# Patient Record
Sex: FEMALE | Race: WHITE | Employment: OTHER | RURAL
[De-identification: names, ages, dates, MRNs, and addresses within clinical notes are randomized per-mention and may not be internally consistent; named-entity substitution may affect disease eponyms.]

---

## 2017-01-12 ENCOUNTER — OFFICE VISIT (OUTPATIENT)
Dept: PRIMARY CARE CLINIC | Age: 82
End: 2017-01-12
Payer: MEDICARE

## 2017-01-12 VITALS
OXYGEN SATURATION: 96 % | BODY MASS INDEX: 22.68 KG/M2 | DIASTOLIC BLOOD PRESSURE: 76 MMHG | SYSTOLIC BLOOD PRESSURE: 124 MMHG | HEIGHT: 63 IN | RESPIRATION RATE: 18 BRPM | WEIGHT: 128 LBS | HEART RATE: 76 BPM

## 2017-01-12 DIAGNOSIS — M48.061 SPINAL STENOSIS, LUMBAR: ICD-10-CM

## 2017-01-12 DIAGNOSIS — E55.9 VITAMIN D DEFICIENCY: ICD-10-CM

## 2017-01-12 DIAGNOSIS — E53.8 B12 DEFICIENCY: ICD-10-CM

## 2017-01-12 DIAGNOSIS — I10 ESSENTIAL HYPERTENSION: ICD-10-CM

## 2017-01-12 DIAGNOSIS — M51.36 DEGENERATIVE DISC DISEASE, LUMBAR: Primary | ICD-10-CM

## 2017-01-12 PROCEDURE — 1090F PRES/ABSN URINE INCON ASSESS: CPT | Performed by: INTERNAL MEDICINE

## 2017-01-12 PROCEDURE — G8419 CALC BMI OUT NRM PARAM NOF/U: HCPCS | Performed by: INTERNAL MEDICINE

## 2017-01-12 PROCEDURE — 96372 THER/PROPH/DIAG INJ SC/IM: CPT | Performed by: INTERNAL MEDICINE

## 2017-01-12 PROCEDURE — G8484 FLU IMMUNIZE NO ADMIN: HCPCS | Performed by: INTERNAL MEDICINE

## 2017-01-12 PROCEDURE — 1123F ACP DISCUSS/DSCN MKR DOCD: CPT | Performed by: INTERNAL MEDICINE

## 2017-01-12 PROCEDURE — 99213 OFFICE O/P EST LOW 20 MIN: CPT | Performed by: INTERNAL MEDICINE

## 2017-01-12 PROCEDURE — 4040F PNEUMOC VAC/ADMIN/RCVD: CPT | Performed by: INTERNAL MEDICINE

## 2017-01-12 PROCEDURE — G8427 DOCREV CUR MEDS BY ELIG CLIN: HCPCS | Performed by: INTERNAL MEDICINE

## 2017-01-12 PROCEDURE — 1036F TOBACCO NON-USER: CPT | Performed by: INTERNAL MEDICINE

## 2017-01-12 RX ORDER — CYANOCOBALAMIN 1000 UG/ML
1000 INJECTION INTRAMUSCULAR; SUBCUTANEOUS ONCE
Status: COMPLETED | OUTPATIENT
Start: 2017-01-12 | End: 2017-01-12

## 2017-01-12 RX ORDER — HYDROCODONE BITARTRATE AND ACETAMINOPHEN 10; 325 MG/1; MG/1
0.5 TABLET ORAL EVERY 8 HOURS PRN
Qty: 30 TABLET | Refills: 0 | Status: SHIPPED | OUTPATIENT
Start: 2017-01-12 | End: 2017-06-06 | Stop reason: ALTCHOICE

## 2017-01-12 RX ADMIN — CYANOCOBALAMIN 1000 MCG: 1000 INJECTION INTRAMUSCULAR; SUBCUTANEOUS at 12:18

## 2017-01-12 ASSESSMENT — ENCOUNTER SYMPTOMS
ABDOMINAL PAIN: 0
EYE DISCHARGE: 1
SHORTNESS OF BREATH: 0
WHEEZING: 0
SINUS PRESSURE: 0
EYE REDNESS: 1
BACK PAIN: 1
NAUSEA: 0
EYE PAIN: 1
SORE THROAT: 0
VOMITING: 0
COUGH: 0

## 2017-03-06 RX ORDER — FAMOTIDINE 20 MG/1
20 TABLET, FILM COATED ORAL 2 TIMES DAILY
Qty: 180 TABLET | Refills: 3 | Status: SHIPPED | OUTPATIENT
Start: 2017-03-06 | End: 2017-03-06 | Stop reason: SDUPTHER

## 2017-03-06 RX ORDER — FAMOTIDINE 20 MG/1
20 TABLET, FILM COATED ORAL 2 TIMES DAILY
Qty: 180 TABLET | Refills: 3 | Status: SHIPPED | OUTPATIENT
Start: 2017-03-06 | End: 2018-05-07 | Stop reason: SDUPTHER

## 2017-05-03 ENCOUNTER — NURSE ONLY (OUTPATIENT)
Dept: PRIMARY CARE CLINIC | Age: 82
End: 2017-05-03
Payer: MEDICARE

## 2017-05-03 DIAGNOSIS — E53.8 B12 DEFICIENCY: Primary | ICD-10-CM

## 2017-05-03 PROCEDURE — 96372 THER/PROPH/DIAG INJ SC/IM: CPT | Performed by: INTERNAL MEDICINE

## 2017-05-03 RX ORDER — CYANOCOBALAMIN 1000 UG/ML
1000 INJECTION INTRAMUSCULAR; SUBCUTANEOUS ONCE
Status: COMPLETED | OUTPATIENT
Start: 2017-05-03 | End: 2017-05-03

## 2017-05-03 RX ADMIN — CYANOCOBALAMIN 1000 MCG: 1000 INJECTION INTRAMUSCULAR; SUBCUTANEOUS at 11:56

## 2017-05-18 ENCOUNTER — HOSPITAL ENCOUNTER (OUTPATIENT)
Dept: OTHER | Age: 82
Discharge: HOME | End: 2017-05-18
Attending: INTERNAL MEDICINE | Admitting: INTERNAL MEDICINE

## 2017-05-18 DIAGNOSIS — I10 ESSENTIAL HYPERTENSION: ICD-10-CM

## 2017-05-18 DIAGNOSIS — E55.9 VITAMIN D DEFICIENCY: ICD-10-CM

## 2017-05-18 LAB
A/G RATIO: 1.4 (ref 0.8–2)
ALBUMIN SERPL-MCNC: 4 G/DL (ref 3.4–4.8)
ALP BLD-CCNC: 93 U/L (ref 25–100)
ALT SERPL-CCNC: 8 U/L (ref 4–36)
ANION GAP SERPL CALCULATED.3IONS-SCNC: 11 MMOL/L (ref 3–16)
AST SERPL-CCNC: 18 U/L (ref 8–33)
BASOPHILS ABSOLUTE: 0 K/UL (ref 0–0.1)
BASOPHILS RELATIVE PERCENT: 0.5 %
BILIRUB SERPL-MCNC: 0.4 MG/DL (ref 0.3–1.2)
BUN BLDV-MCNC: 13 MG/DL (ref 6–20)
CALCIUM SERPL-MCNC: 9.7 MG/DL (ref 8.5–10.5)
CHLORIDE BLD-SCNC: 98 MMOL/L (ref 98–107)
CHOLESTEROL, TOTAL: 300 MG/DL (ref 0–200)
CO2: 28 MMOL/L (ref 20–30)
CREAT SERPL-MCNC: 0.7 MG/DL (ref 0.4–1.2)
EOSINOPHILS ABSOLUTE: 0.3 K/UL (ref 0–0.4)
EOSINOPHILS RELATIVE PERCENT: 3.3 %
GFR AFRICAN AMERICAN: >59
GFR NON-AFRICAN AMERICAN: >60
GLOBULIN: 2.9 G/DL
GLUCOSE BLD-MCNC: 96 MG/DL (ref 74–106)
HCT VFR BLD CALC: 43.1 % (ref 37–47)
HDLC SERPL-MCNC: 64 MG/DL (ref 40–60)
HEMOGLOBIN: 14.1 G/DL (ref 11.5–16.5)
LDL CHOLESTEROL CALCULATED: 205 MG/DL
LYMPHOCYTES ABSOLUTE: 1.4 K/UL (ref 1.5–4)
LYMPHOCYTES RELATIVE PERCENT: 18 % (ref 20–40)
MCH RBC QN AUTO: 29.7 PG (ref 27–32)
MCHC RBC AUTO-ENTMCNC: 32.7 G/DL (ref 31–35)
MCV RBC AUTO: 90.7 FL (ref 80–100)
MONOCYTES ABSOLUTE: 0.6 K/UL (ref 0.2–0.8)
MONOCYTES RELATIVE PERCENT: 7.9 % (ref 3–10)
NEUTROPHILS ABSOLUTE: 5.3 K/UL (ref 2–7.5)
NEUTROPHILS RELATIVE PERCENT: 70.3 %
PDW BLD-RTO: 13.3 % (ref 11–16)
PLATELET # BLD: 252 K/UL (ref 150–400)
PMV BLD AUTO: 11 FL (ref 6–10)
POTASSIUM SERPL-SCNC: 4.2 MMOL/L (ref 3.4–5.1)
RBC # BLD: 4.75 M/UL (ref 3.8–5.8)
SODIUM BLD-SCNC: 137 MMOL/L (ref 136–145)
TOTAL PROTEIN: 6.9 G/DL (ref 6.4–8.3)
TRIGL SERPL-MCNC: 157 MG/DL (ref 0–249)
VITAMIN D 25-HYDROXY: 50.3 (ref 32–100)
VLDLC SERPL CALC-MCNC: 31 MG/DL
WBC # BLD: 7.6 K/UL (ref 4–11)

## 2017-06-06 ENCOUNTER — OFFICE VISIT (OUTPATIENT)
Dept: PRIMARY CARE CLINIC | Age: 82
End: 2017-06-06
Payer: MEDICARE

## 2017-06-06 VITALS
BODY MASS INDEX: 22.53 KG/M2 | HEART RATE: 82 BPM | OXYGEN SATURATION: 98 % | RESPIRATION RATE: 18 BRPM | SYSTOLIC BLOOD PRESSURE: 124 MMHG | WEIGHT: 127.2 LBS | DIASTOLIC BLOOD PRESSURE: 70 MMHG

## 2017-06-06 DIAGNOSIS — I10 ESSENTIAL HYPERTENSION: ICD-10-CM

## 2017-06-06 DIAGNOSIS — R91.1 LUNG NODULE: ICD-10-CM

## 2017-06-06 DIAGNOSIS — E53.8 B12 DEFICIENCY: ICD-10-CM

## 2017-06-06 DIAGNOSIS — M48.061 SPINAL STENOSIS, LUMBAR: ICD-10-CM

## 2017-06-06 DIAGNOSIS — M51.36 DEGENERATIVE DISC DISEASE, LUMBAR: Primary | ICD-10-CM

## 2017-06-06 DIAGNOSIS — E55.9 VITAMIN D DEFICIENCY: ICD-10-CM

## 2017-06-06 PROCEDURE — 1090F PRES/ABSN URINE INCON ASSESS: CPT | Performed by: INTERNAL MEDICINE

## 2017-06-06 PROCEDURE — 4040F PNEUMOC VAC/ADMIN/RCVD: CPT | Performed by: INTERNAL MEDICINE

## 2017-06-06 PROCEDURE — 99213 OFFICE O/P EST LOW 20 MIN: CPT | Performed by: INTERNAL MEDICINE

## 2017-06-06 PROCEDURE — 1036F TOBACCO NON-USER: CPT | Performed by: INTERNAL MEDICINE

## 2017-06-06 PROCEDURE — 1123F ACP DISCUSS/DSCN MKR DOCD: CPT | Performed by: INTERNAL MEDICINE

## 2017-06-06 PROCEDURE — G8420 CALC BMI NORM PARAMETERS: HCPCS | Performed by: INTERNAL MEDICINE

## 2017-06-06 PROCEDURE — G8427 DOCREV CUR MEDS BY ELIG CLIN: HCPCS | Performed by: INTERNAL MEDICINE

## 2017-06-06 ASSESSMENT — PATIENT HEALTH QUESTIONNAIRE - PHQ9
SUM OF ALL RESPONSES TO PHQ9 QUESTIONS 1 & 2: 1
1. LITTLE INTEREST OR PLEASURE IN DOING THINGS: 0
2. FEELING DOWN, DEPRESSED OR HOPELESS: 1
SUM OF ALL RESPONSES TO PHQ QUESTIONS 1-9: 1

## 2017-06-07 ASSESSMENT — ENCOUNTER SYMPTOMS
ABDOMINAL PAIN: 0
WHEEZING: 0
SHORTNESS OF BREATH: 0
NAUSEA: 0
BACK PAIN: 1
SINUS PRESSURE: 0
VOMITING: 0
SORE THROAT: 0
EYE DISCHARGE: 0
COUGH: 0

## 2017-06-13 ENCOUNTER — NURSE ONLY (OUTPATIENT)
Dept: PRIMARY CARE CLINIC | Age: 82
End: 2017-06-13
Payer: MEDICARE

## 2017-06-13 DIAGNOSIS — E53.8 B12 DEFICIENCY: Primary | ICD-10-CM

## 2017-06-13 PROCEDURE — 96372 THER/PROPH/DIAG INJ SC/IM: CPT | Performed by: INTERNAL MEDICINE

## 2017-06-13 RX ORDER — CYANOCOBALAMIN 1000 UG/ML
1000 INJECTION INTRAMUSCULAR; SUBCUTANEOUS ONCE
Status: COMPLETED | OUTPATIENT
Start: 2017-06-13 | End: 2017-06-13

## 2017-06-13 RX ADMIN — CYANOCOBALAMIN 1000 MCG: 1000 INJECTION INTRAMUSCULAR; SUBCUTANEOUS at 13:50

## 2017-06-14 RX ORDER — ESOMEPRAZOLE MAGNESIUM 40 MG/1
40 CAPSULE, DELAYED RELEASE ORAL DAILY
Qty: 90 CAPSULE | Refills: 3 | Status: SHIPPED | OUTPATIENT
Start: 2017-06-14 | End: 2018-02-12 | Stop reason: SDUPTHER

## 2017-06-19 ENCOUNTER — HOSPITAL ENCOUNTER (OUTPATIENT)
Dept: MRI IMAGING | Age: 82
Discharge: HOME | End: 2017-06-19
Attending: INTERNAL MEDICINE | Admitting: INTERNAL MEDICINE

## 2017-06-19 DIAGNOSIS — M48.061 SPINAL STENOSIS, LUMBAR: ICD-10-CM

## 2017-06-19 DIAGNOSIS — M51.36 DEGENERATIVE DISC DISEASE, LUMBAR: ICD-10-CM

## 2017-06-19 DIAGNOSIS — R91.1 SOLITARY PULMONARY NODULE: ICD-10-CM

## 2017-07-12 ENCOUNTER — OFFICE VISIT (OUTPATIENT)
Dept: PRIMARY CARE CLINIC | Age: 82
End: 2017-07-12
Payer: MEDICARE

## 2017-07-12 VITALS
OXYGEN SATURATION: 97 % | WEIGHT: 127.8 LBS | HEART RATE: 83 BPM | DIASTOLIC BLOOD PRESSURE: 64 MMHG | SYSTOLIC BLOOD PRESSURE: 126 MMHG | RESPIRATION RATE: 18 BRPM | BODY MASS INDEX: 22.64 KG/M2

## 2017-07-12 DIAGNOSIS — E53.8 B12 DEFICIENCY: ICD-10-CM

## 2017-07-12 DIAGNOSIS — E78.5 HYPERLIPIDEMIA, UNSPECIFIED HYPERLIPIDEMIA TYPE: ICD-10-CM

## 2017-07-12 DIAGNOSIS — M51.36 DEGENERATIVE DISC DISEASE, LUMBAR: Primary | ICD-10-CM

## 2017-07-12 DIAGNOSIS — M48.061 SPINAL STENOSIS, LUMBAR: ICD-10-CM

## 2017-07-12 DIAGNOSIS — I10 ESSENTIAL HYPERTENSION: ICD-10-CM

## 2017-07-12 DIAGNOSIS — E55.9 VITAMIN D DEFICIENCY: ICD-10-CM

## 2017-07-12 PROCEDURE — G8427 DOCREV CUR MEDS BY ELIG CLIN: HCPCS | Performed by: INTERNAL MEDICINE

## 2017-07-12 PROCEDURE — 1123F ACP DISCUSS/DSCN MKR DOCD: CPT | Performed by: INTERNAL MEDICINE

## 2017-07-12 PROCEDURE — G8420 CALC BMI NORM PARAMETERS: HCPCS | Performed by: INTERNAL MEDICINE

## 2017-07-12 PROCEDURE — 4040F PNEUMOC VAC/ADMIN/RCVD: CPT | Performed by: INTERNAL MEDICINE

## 2017-07-12 PROCEDURE — 96372 THER/PROPH/DIAG INJ SC/IM: CPT | Performed by: INTERNAL MEDICINE

## 2017-07-12 PROCEDURE — 1090F PRES/ABSN URINE INCON ASSESS: CPT | Performed by: INTERNAL MEDICINE

## 2017-07-12 PROCEDURE — 99213 OFFICE O/P EST LOW 20 MIN: CPT | Performed by: INTERNAL MEDICINE

## 2017-07-12 PROCEDURE — 1036F TOBACCO NON-USER: CPT | Performed by: INTERNAL MEDICINE

## 2017-07-12 RX ORDER — CYANOCOBALAMIN 1000 UG/ML
1000 INJECTION INTRAMUSCULAR; SUBCUTANEOUS ONCE
Status: COMPLETED | OUTPATIENT
Start: 2017-07-12 | End: 2017-07-12

## 2017-07-12 RX ORDER — RANITIDINE 150 MG/1
150 TABLET ORAL 2 TIMES DAILY
Qty: 60 TABLET | Refills: 1 | Status: SHIPPED | OUTPATIENT
Start: 2017-07-12 | End: 2017-09-18

## 2017-07-12 RX ADMIN — CYANOCOBALAMIN 1000 MCG: 1000 INJECTION INTRAMUSCULAR; SUBCUTANEOUS at 15:50

## 2017-07-12 ASSESSMENT — ENCOUNTER SYMPTOMS
SHORTNESS OF BREATH: 0
NAUSEA: 0
SORE THROAT: 0
BACK PAIN: 1
COUGH: 0
ABDOMINAL PAIN: 0
EYE DISCHARGE: 0
VOMITING: 0
WHEEZING: 0
SINUS PRESSURE: 0

## 2017-07-24 ENCOUNTER — OFFICE VISIT (OUTPATIENT)
Dept: OBSTETRICS AND GYNECOLOGY | Facility: CLINIC | Age: 82
End: 2017-07-24

## 2017-07-24 VITALS
WEIGHT: 128 LBS | BODY MASS INDEX: 23.55 KG/M2 | HEIGHT: 62 IN | SYSTOLIC BLOOD PRESSURE: 112 MMHG | DIASTOLIC BLOOD PRESSURE: 74 MMHG

## 2017-07-24 DIAGNOSIS — N95.2 ATROPHIC VAGINITIS: Primary | ICD-10-CM

## 2017-07-24 DIAGNOSIS — R35.0 URINE FREQUENCY: ICD-10-CM

## 2017-07-24 PROCEDURE — 99212 OFFICE O/P EST SF 10 MIN: CPT | Performed by: PHYSICIAN ASSISTANT

## 2017-07-24 RX ORDER — CLONIDINE HYDROCHLORIDE 0.2 MG/1
TABLET ORAL
COMMUNITY
Start: 2016-12-12

## 2017-07-24 RX ORDER — ALPRAZOLAM 0.25 MG/1
0.25 TABLET ORAL AS NEEDED
COMMUNITY
Start: 2016-11-21

## 2017-07-24 RX ORDER — NIFEDIPINE 30 MG/1
30 TABLET, EXTENDED RELEASE ORAL DAILY
COMMUNITY
Start: 2016-09-21

## 2017-07-24 RX ORDER — FAMOTIDINE 20 MG/1
20 TABLET, FILM COATED ORAL 2 TIMES DAILY PRN
COMMUNITY
Start: 2017-03-06

## 2017-07-24 RX ORDER — FAMOTIDINE 20 MG/1
TABLET, FILM COATED ORAL
Refills: 0 | COMMUNITY
Start: 2017-06-08 | End: 2018-05-15 | Stop reason: SDUPTHER

## 2017-07-24 RX ORDER — ESOMEPRAZOLE MAGNESIUM 40 MG/1
40 CAPSULE, DELAYED RELEASE ORAL DAILY
COMMUNITY
Start: 2017-06-14

## 2017-07-24 RX ORDER — METRONIDAZOLE 10 MG/G
GEL TOPICAL
COMMUNITY
Start: 2016-02-18

## 2017-07-24 RX ORDER — CLONIDINE HYDROCHLORIDE 0.2 MG/1
TABLET ORAL
Refills: 0 | COMMUNITY
Start: 2017-06-08 | End: 2018-05-15 | Stop reason: SDUPTHER

## 2017-07-24 RX ORDER — SERTRALINE HYDROCHLORIDE 100 MG/1
TABLET, FILM COATED ORAL
COMMUNITY
Start: 2016-08-25

## 2017-07-24 RX ORDER — RANITIDINE 150 MG/1
TABLET ORAL
COMMUNITY
Start: 2017-07-12 | End: 2018-05-15

## 2017-07-24 RX ORDER — CARVEDILOL 25 MG/1
TABLET ORAL
COMMUNITY
Start: 2016-09-09

## 2017-07-24 RX ORDER — ESTRADIOL 0.1 MG/G
2 CREAM VAGINAL DAILY
COMMUNITY
Start: 2016-09-21

## 2017-07-24 RX ORDER — METOPROLOL SUCCINATE 25 MG/1
TABLET, EXTENDED RELEASE ORAL
COMMUNITY
Start: 2016-12-12 | End: 2019-06-13

## 2017-07-24 NOTE — PROGRESS NOTES
"Subjective   Chief Complaint   Patient presents with   • Vaginal Discharge   • vaginal atrophy   • Urinary Frequency     doesn't always empty     /74  Ht 62\" (157.5 cm)  Wt 128 lb (58.1 kg)  BMI 23.41 kg/m2   Gill Guerrero is a 86 y.o. year old  presenting to be seen for complaints of vaginal irritation and dryness  She feels very irritated vaginal opening, inside vagina and perineum  She does report thin water discharge from time to time  She has been given premarin cream in the past but does not use regularly and has not used for several months  She has had no vaginal bleeding or spotting  She has not had any UTI nor dysuria-she does have some urinary frequency and urgency     Past Medical History:   Diagnosis Date   • Anemia    • Anxiety    • Depression    • GERD (gastroesophageal reflux disease)    • Osteopenia    • Urinary tract infection         Current Outpatient Prescriptions:   •  ALPRAZolam (XANAX) 0.25 MG tablet, Take  by mouth., Disp: , Rfl:   •  carvedilol (COREG) 25 MG tablet, take 1 tablet by mouth twice a day, Disp: , Rfl:   •  CloNIDine (CATAPRES) 0.2 MG tablet, take 1 tablet by mouth twice a day, Disp: , Rfl:   •  CloNIDine (CATAPRES) 0.2 MG tablet, , Disp: , Rfl: 0  •  esomeprazole (nexIUM) 40 MG capsule, Take  by mouth., Disp: , Rfl:   •  estradiol (ESTRACE) 0.1 MG/GM vaginal cream, Insert  into the vagina., Disp: , Rfl:   •  famotidine (PEPCID) 20 MG tablet, Take  by mouth., Disp: , Rfl:   •  famotidine (PEPCID) 20 MG tablet, , Disp: , Rfl: 0  •  metoprolol succinate XL (TOPROL-XL) 25 MG 24 hr tablet, take 1 tablet by mouth once daily, Disp: , Rfl:   •  metroNIDAZOLE (METROGEL) 1 % gel, Bid As directed for 4 weeks, Disp: , Rfl:   •  Multiple Vitamins-Minerals (MULTIVITAMIN ADULT PO), Take  by mouth., Disp: , Rfl:   •  NIFEdipine XL (PROCARDIA XL) 30 MG 24 hr tablet, Take  by mouth., Disp: , Rfl:   •  raNITIdine (ZANTAC) 150 MG tablet, Take  by mouth., Disp: , Rfl:   •  " sertraline (ZOLOFT) 100 MG tablet, Take two pills daily., Disp: , Rfl:    Allergies   Allergen Reactions   • Cephalexin    • Chocolate    • Erythromycin    • Iodine    • Nuts    • Penicillins    • Shellfish-Derived Products    • Tramadol Itching      Past Surgical History:   Procedure Laterality Date   • ADENOIDECTOMY     • BREAST BIOPSY     • HYSTERECTOMY     • THYROID BIOPSY     • TOTAL HIP ARTHROPLASTY      right   • WISDOM TOOTH EXTRACTION        Social History     Social History   • Marital status:      Spouse name: N/A   • Number of children: N/A   • Years of education: N/A     Occupational History   • Not on file.     Social History Main Topics   • Smoking status: Former Smoker   • Smokeless tobacco: Never Used   • Alcohol use No   • Drug use: No   • Sexual activity: Defer     Other Topics Concern   • Not on file     Social History Narrative   • No narrative on file      Family History   Problem Relation Age of Onset   • Coronary artery disease Father    • Hyperlipidemia Father    • Coronary artery disease Brother    • Hyperlipidemia Brother    • Coronary artery disease Sister    • Hyperlipidemia Sister    • Hypertension Sister    • Hyperlipidemia Paternal Grandfather    • Hyperlipidemia Paternal Grandmother    • Hyperlipidemia Maternal Grandmother    • Hyperlipidemia Maternal Grandfather        The following portions of the patient's history were reviewed and updated as appropriate:problem list, current medications, allergies, past family history, past medical history, past social history and past surgical history.    Review of Systems   Gastrointestinal: Negative.    Genitourinary: Positive for frequency, urgency and vaginal pain. Negative for dysuria and vaginal bleeding.   Musculoskeletal: Positive for arthralgias.   Neurological: Positive for weakness.   Psychiatric/Behavioral: Positive for dysphoric mood.        Objective     Physical Exam   Constitutional: She appears well-developed and  well-nourished. She is cooperative.   Abdominal: Soft. Normal appearance. She exhibits no distension and no mass. There is no tenderness. There is no rigidity and no guarding.   Genitourinary: There is no tenderness or lesion on the right labia. There is no tenderness or lesion on the left labia. Right adnexum displays no mass and no tenderness. Left adnexum displays no mass and no tenderness. There is tenderness in the vagina. No bleeding in the vagina. No signs of injury around the vagina. No vaginal discharge found.   Genitourinary Comments: Vaginal tissue very atrophic--no lesions noted-no bleeding   Neurological: She is alert.   Skin: Skin is warm, dry and intact.   Psychiatric: She has a normal mood and affect. Her behavior is normal.     Gill was seen today for vaginal discharge, vaginal atrophy and urinary frequency.    Diagnoses and all orders for this visit:    Atrophic vaginitis    Urine frequency             This note was electronically signed.    Makenna Cedeño PA-C   July 24, 2017

## 2017-09-12 ENCOUNTER — NURSE ONLY (OUTPATIENT)
Dept: PRIMARY CARE CLINIC | Age: 82
End: 2017-09-12
Payer: MEDICARE

## 2017-09-12 DIAGNOSIS — R30.0 DYSURIA: Primary | ICD-10-CM

## 2017-09-12 DIAGNOSIS — E53.8 B12 DEFICIENCY: ICD-10-CM

## 2017-09-12 LAB
BILIRUBIN, POC: ABNORMAL
BLOOD URINE, POC: ABNORMAL
CLARITY, POC: CLEAR
COLOR, POC: YELLOW
GLUCOSE URINE, POC: ABNORMAL
KETONES, POC: ABNORMAL
LEUKOCYTE EST, POC: ABNORMAL
NITRITE, POC: ABNORMAL
PH, POC: 6
PROTEIN, POC: ABNORMAL
SPECIFIC GRAVITY, POC: 1.01
UROBILINOGEN, POC: 0.2

## 2017-09-12 PROCEDURE — 96372 THER/PROPH/DIAG INJ SC/IM: CPT | Performed by: INTERNAL MEDICINE

## 2017-09-12 PROCEDURE — 81002 URINALYSIS NONAUTO W/O SCOPE: CPT | Performed by: INTERNAL MEDICINE

## 2017-09-12 RX ORDER — CYANOCOBALAMIN 1000 UG/ML
1000 INJECTION INTRAMUSCULAR; SUBCUTANEOUS ONCE
Status: COMPLETED | OUTPATIENT
Start: 2017-09-12 | End: 2017-09-12

## 2017-09-12 RX ORDER — CIPROFLOXACIN 250 MG/1
250 TABLET, FILM COATED ORAL 2 TIMES DAILY
Qty: 12 TABLET | Refills: 0 | Status: SHIPPED | OUTPATIENT
Start: 2017-09-12 | End: 2017-09-18

## 2017-09-14 RX ORDER — NITROFURANTOIN MACROCRYSTALS 100 MG/1
100 CAPSULE ORAL 2 TIMES DAILY
Qty: 10 CAPSULE | Refills: 0 | Status: SHIPPED | OUTPATIENT
Start: 2017-09-14 | End: 2017-09-19

## 2017-09-14 RX ORDER — NIFEDIPINE 30 MG/1
TABLET, EXTENDED RELEASE ORAL
Qty: 90 TABLET | Refills: 3 | Status: SHIPPED | OUTPATIENT
Start: 2017-09-14 | End: 2018-09-30 | Stop reason: SDUPTHER

## 2017-09-18 ENCOUNTER — OFFICE VISIT (OUTPATIENT)
Dept: PRIMARY CARE CLINIC | Age: 82
End: 2017-09-18
Payer: MEDICARE

## 2017-09-18 VITALS
HEIGHT: 63 IN | DIASTOLIC BLOOD PRESSURE: 74 MMHG | BODY MASS INDEX: 22.15 KG/M2 | OXYGEN SATURATION: 94 % | SYSTOLIC BLOOD PRESSURE: 136 MMHG | RESPIRATION RATE: 18 BRPM | HEART RATE: 81 BPM | WEIGHT: 125 LBS

## 2017-09-18 DIAGNOSIS — M48.061 SPINAL STENOSIS, LUMBAR: ICD-10-CM

## 2017-09-18 DIAGNOSIS — I10 ESSENTIAL HYPERTENSION: Primary | ICD-10-CM

## 2017-09-18 DIAGNOSIS — R07.89 ATYPICAL CHEST PAIN: ICD-10-CM

## 2017-09-18 DIAGNOSIS — M51.36 DEGENERATIVE DISC DISEASE, LUMBAR: ICD-10-CM

## 2017-09-18 PROCEDURE — 4040F PNEUMOC VAC/ADMIN/RCVD: CPT | Performed by: INTERNAL MEDICINE

## 2017-09-18 PROCEDURE — 1036F TOBACCO NON-USER: CPT | Performed by: INTERNAL MEDICINE

## 2017-09-18 PROCEDURE — G8420 CALC BMI NORM PARAMETERS: HCPCS | Performed by: INTERNAL MEDICINE

## 2017-09-18 PROCEDURE — 99213 OFFICE O/P EST LOW 20 MIN: CPT | Performed by: INTERNAL MEDICINE

## 2017-09-18 PROCEDURE — 1090F PRES/ABSN URINE INCON ASSESS: CPT | Performed by: INTERNAL MEDICINE

## 2017-09-18 PROCEDURE — 1123F ACP DISCUSS/DSCN MKR DOCD: CPT | Performed by: INTERNAL MEDICINE

## 2017-09-18 PROCEDURE — G8427 DOCREV CUR MEDS BY ELIG CLIN: HCPCS | Performed by: INTERNAL MEDICINE

## 2017-09-21 RX ORDER — LISINOPRIL 10 MG/1
10 TABLET ORAL DAILY
Qty: 30 TABLET | Refills: 3 | Status: SHIPPED | OUTPATIENT
Start: 2017-09-21 | End: 2018-05-07 | Stop reason: ALTCHOICE

## 2017-09-24 ASSESSMENT — ENCOUNTER SYMPTOMS
COUGH: 0
NAUSEA: 0
BACK PAIN: 1
EYE DISCHARGE: 0
SINUS PRESSURE: 0
WHEEZING: 0
SORE THROAT: 0
ABDOMINAL DISTENTION: 0
ABDOMINAL PAIN: 1
SHORTNESS OF BREATH: 0
VOMITING: 0

## 2017-10-03 RX ORDER — CARVEDILOL 25 MG/1
TABLET ORAL
Qty: 180 TABLET | Refills: 3 | Status: SHIPPED | OUTPATIENT
Start: 2017-10-03 | End: 2018-11-15 | Stop reason: SDUPTHER

## 2017-10-03 RX ORDER — LOSARTAN POTASSIUM 100 MG/1
100 TABLET ORAL DAILY
Qty: 30 TABLET | Refills: 3 | Status: SHIPPED | OUTPATIENT
Start: 2017-10-03 | End: 2018-12-01 | Stop reason: SDUPTHER

## 2017-10-12 ENCOUNTER — NURSE ONLY (OUTPATIENT)
Dept: PRIMARY CARE CLINIC | Age: 82
End: 2017-10-12
Payer: MEDICARE

## 2017-10-12 DIAGNOSIS — E53.8 B12 DEFICIENCY: Primary | ICD-10-CM

## 2017-10-12 PROCEDURE — 96372 THER/PROPH/DIAG INJ SC/IM: CPT | Performed by: INTERNAL MEDICINE

## 2017-10-12 RX ORDER — CYANOCOBALAMIN 1000 UG/ML
1000 INJECTION INTRAMUSCULAR; SUBCUTANEOUS ONCE
Status: COMPLETED | OUTPATIENT
Start: 2017-10-12 | End: 2017-10-12

## 2017-10-12 RX ADMIN — CYANOCOBALAMIN 1000 MCG: 1000 INJECTION INTRAMUSCULAR; SUBCUTANEOUS at 15:41

## 2017-10-13 RX ORDER — SERTRALINE HYDROCHLORIDE 100 MG/1
TABLET, FILM COATED ORAL
Qty: 180 TABLET | Refills: 3 | Status: SHIPPED | OUTPATIENT
Start: 2017-10-13 | End: 2018-06-25

## 2017-10-16 ENCOUNTER — IMMUNIZATION (OUTPATIENT)
Dept: PRIMARY CARE CLINIC | Age: 82
End: 2017-10-16

## 2017-10-16 DIAGNOSIS — E53.8 B12 DEFICIENCY: Primary | ICD-10-CM

## 2017-10-16 DIAGNOSIS — Z23 NEEDS FLU SHOT: ICD-10-CM

## 2017-10-16 PROCEDURE — 90688 IIV4 VACCINE SPLT 0.5 ML IM: CPT | Performed by: INTERNAL MEDICINE

## 2017-10-16 PROCEDURE — G0008 ADMIN INFLUENZA VIRUS VAC: HCPCS | Performed by: INTERNAL MEDICINE

## 2017-10-16 RX ORDER — CYANOCOBALAMIN 1000 UG/ML
1000 INJECTION INTRAMUSCULAR; SUBCUTANEOUS ONCE
Status: DISCONTINUED | OUTPATIENT
Start: 2017-10-16 | End: 2020-02-19

## 2017-11-09 ENCOUNTER — NURSE ONLY (OUTPATIENT)
Dept: PRIMARY CARE CLINIC | Age: 82
End: 2017-11-09
Payer: MEDICARE

## 2017-11-09 DIAGNOSIS — E53.8 B12 DEFICIENCY: Primary | ICD-10-CM

## 2017-11-09 PROCEDURE — 96372 THER/PROPH/DIAG INJ SC/IM: CPT | Performed by: INTERNAL MEDICINE

## 2017-11-09 RX ORDER — CYANOCOBALAMIN 1000 UG/ML
1000 INJECTION INTRAMUSCULAR; SUBCUTANEOUS ONCE
Status: COMPLETED | OUTPATIENT
Start: 2017-11-09 | End: 2017-11-09

## 2017-11-09 RX ADMIN — CYANOCOBALAMIN 1000 MCG: 1000 INJECTION INTRAMUSCULAR; SUBCUTANEOUS at 14:24

## 2017-11-09 NOTE — PROGRESS NOTES
Per Kelly Saunders MD orders Arley Dick was given 1 ml  of b12 IM, in the right deltoid. No complications or reactions were noted. Patient tolerated procedure well.

## 2017-12-11 DIAGNOSIS — I10 ESSENTIAL HYPERTENSION: ICD-10-CM

## 2017-12-11 DIAGNOSIS — E78.5 HYPERLIPIDEMIA, UNSPECIFIED HYPERLIPIDEMIA TYPE: Primary | ICD-10-CM

## 2017-12-14 ENCOUNTER — HOSPITAL ENCOUNTER (OUTPATIENT)
Dept: OTHER | Age: 82
Discharge: HOME | End: 2017-12-14
Attending: INTERNAL MEDICINE | Admitting: INTERNAL MEDICINE

## 2017-12-14 DIAGNOSIS — I10 ESSENTIAL HYPERTENSION: ICD-10-CM

## 2017-12-14 DIAGNOSIS — E78.5 HYPERLIPIDEMIA, UNSPECIFIED HYPERLIPIDEMIA TYPE: ICD-10-CM

## 2017-12-14 LAB
A/G RATIO: 1.4 (ref 0.8–2)
ALBUMIN SERPL-MCNC: 3.8 G/DL (ref 3.4–4.8)
ALP BLD-CCNC: 88 U/L (ref 25–100)
ALT SERPL-CCNC: 9 U/L (ref 4–36)
ANION GAP SERPL CALCULATED.3IONS-SCNC: 11 MMOL/L (ref 3–16)
AST SERPL-CCNC: 16 U/L (ref 8–33)
BASOPHILS ABSOLUTE: 0.1 K/UL (ref 0–0.1)
BASOPHILS RELATIVE PERCENT: 0.7 %
BILIRUB SERPL-MCNC: 0.3 MG/DL (ref 0.3–1.2)
BUN BLDV-MCNC: 23 MG/DL (ref 6–20)
CALCIUM SERPL-MCNC: 9.5 MG/DL (ref 8.5–10.5)
CHLORIDE BLD-SCNC: 102 MMOL/L (ref 98–107)
CO2: 28 MMOL/L (ref 20–30)
CREAT SERPL-MCNC: <0.5 MG/DL (ref 0.4–1.2)
EOSINOPHILS ABSOLUTE: 0.2 K/UL (ref 0–0.4)
EOSINOPHILS RELATIVE PERCENT: 2.9 %
GFR AFRICAN AMERICAN: >59
GFR NON-AFRICAN AMERICAN: >60
GLOBULIN: 2.7 G/DL
GLUCOSE BLD-MCNC: 82 MG/DL (ref 74–106)
HCT VFR BLD CALC: 41.6 % (ref 37–47)
HEMOGLOBIN: 13.2 G/DL (ref 11.5–16.5)
IMMATURE GRANULOCYTES #: 0
IMMATURE GRANULOCYTES %: 0.1 % (ref 0–5)
LYMPHOCYTES ABSOLUTE: 1.5 K/UL (ref 1.5–4)
LYMPHOCYTES RELATIVE PERCENT: 21.2 %
MCH RBC QN AUTO: 29.9 PG (ref 27–32)
MCHC RBC AUTO-ENTMCNC: 31.7 G/DL (ref 31–35)
MCV RBC AUTO: 94.1 FL (ref 80–100)
MONOCYTES ABSOLUTE: 0.7 K/UL (ref 0.2–0.8)
MONOCYTES RELATIVE PERCENT: 9 %
NEUTROPHILS ABSOLUTE: 4.8 K/UL (ref 2–7.5)
NEUTROPHILS RELATIVE PERCENT: 66.1 %
PDW BLD-RTO: 13.7 % (ref 11–16)
PLATELET # BLD: 284 K/UL (ref 150–400)
PMV BLD AUTO: 10.9 FL (ref 6–10)
POTASSIUM SERPL-SCNC: 4.3 MMOL/L (ref 3.4–5.1)
RBC # BLD: 4.42 M/UL (ref 3.8–5.8)
SODIUM BLD-SCNC: 141 MMOL/L (ref 136–145)
TOTAL PROTEIN: 6.5 G/DL (ref 6.4–8.3)
TSH SERPL DL<=0.05 MIU/L-ACNC: 3 UIU/ML (ref 0.35–5.5)
WBC # BLD: 7.2 K/UL (ref 4–11)

## 2017-12-18 ENCOUNTER — OFFICE VISIT (OUTPATIENT)
Dept: PRIMARY CARE CLINIC | Age: 82
End: 2017-12-18
Payer: MEDICARE

## 2017-12-18 VITALS
DIASTOLIC BLOOD PRESSURE: 70 MMHG | OXYGEN SATURATION: 96 % | WEIGHT: 125 LBS | RESPIRATION RATE: 18 BRPM | HEART RATE: 79 BPM | SYSTOLIC BLOOD PRESSURE: 134 MMHG | BODY MASS INDEX: 22.5 KG/M2

## 2017-12-18 DIAGNOSIS — M51.36 DEGENERATIVE DISC DISEASE, LUMBAR: ICD-10-CM

## 2017-12-18 DIAGNOSIS — I10 ESSENTIAL HYPERTENSION: Primary | ICD-10-CM

## 2017-12-18 DIAGNOSIS — E53.8 B12 DEFICIENCY: ICD-10-CM

## 2017-12-18 DIAGNOSIS — M48.061 SPINAL STENOSIS OF LUMBAR REGION, UNSPECIFIED WHETHER NEUROGENIC CLAUDICATION PRESENT: ICD-10-CM

## 2017-12-18 PROCEDURE — 96372 THER/PROPH/DIAG INJ SC/IM: CPT | Performed by: INTERNAL MEDICINE

## 2017-12-18 PROCEDURE — 1123F ACP DISCUSS/DSCN MKR DOCD: CPT | Performed by: INTERNAL MEDICINE

## 2017-12-18 PROCEDURE — 1036F TOBACCO NON-USER: CPT | Performed by: INTERNAL MEDICINE

## 2017-12-18 PROCEDURE — 1090F PRES/ABSN URINE INCON ASSESS: CPT | Performed by: INTERNAL MEDICINE

## 2017-12-18 PROCEDURE — 99213 OFFICE O/P EST LOW 20 MIN: CPT | Performed by: INTERNAL MEDICINE

## 2017-12-18 PROCEDURE — G8484 FLU IMMUNIZE NO ADMIN: HCPCS | Performed by: INTERNAL MEDICINE

## 2017-12-18 PROCEDURE — G8420 CALC BMI NORM PARAMETERS: HCPCS | Performed by: INTERNAL MEDICINE

## 2017-12-18 PROCEDURE — G8427 DOCREV CUR MEDS BY ELIG CLIN: HCPCS | Performed by: INTERNAL MEDICINE

## 2017-12-18 PROCEDURE — 4040F PNEUMOC VAC/ADMIN/RCVD: CPT | Performed by: INTERNAL MEDICINE

## 2017-12-18 RX ORDER — CYANOCOBALAMIN 1000 UG/ML
1000 INJECTION INTRAMUSCULAR; SUBCUTANEOUS ONCE
Status: COMPLETED | OUTPATIENT
Start: 2017-12-18 | End: 2017-12-18

## 2017-12-18 RX ORDER — LOSARTAN POTASSIUM 100 MG/1
100 TABLET ORAL DAILY
Qty: 90 TABLET | Refills: 3 | Status: CANCELLED | OUTPATIENT
Start: 2017-12-18

## 2017-12-18 RX ADMIN — CYANOCOBALAMIN 1000 MCG: 1000 INJECTION INTRAMUSCULAR; SUBCUTANEOUS at 16:12

## 2017-12-18 ASSESSMENT — ENCOUNTER SYMPTOMS
ABDOMINAL PAIN: 1
SORE THROAT: 0
BACK PAIN: 1
WHEEZING: 0
EYE DISCHARGE: 0
SINUS PRESSURE: 0
ABDOMINAL DISTENTION: 0
VOMITING: 0
NAUSEA: 0
COUGH: 0
SHORTNESS OF BREATH: 0

## 2017-12-18 NOTE — PROGRESS NOTES
MG delayed release capsule Take 1 capsule by mouth daily 90 capsule 3    famotidine (PEPCID) 20 MG tablet Take 1 tablet by mouth 2 times daily 180 tablet 3    cloNIDine (CATAPRES) 0.2 MG tablet take 1 tablet by mouth twice a day 180 tablet 2    metoprolol succinate (TOPROL XL) 25 MG extended release tablet take 1 tablet by mouth once daily 90 tablet 3    ALPRAZolam (XANAX) 0.25 MG tablet Take 1 tablet by mouth 2 times daily as needed for Sleep or Anxiety 60 tablet 0    estradiol (ESTRACE VAGINAL) 0.1 MG/GM vaginal cream Place 2 g vaginally daily 3 Tube 3    metroNIDAZOLE (METROGEL) 1 % gel Bid As directed for 4 weeks 1 Tube 1    multivitamin (THERAGRAN) per tablet Take 1 tablet by mouth daily. Review of Systems   Constitutional: Negative for chills and fever. HENT: Negative for congestion, sinus pressure and sore throat. Eyes: Negative for discharge and visual disturbance. Respiratory: Negative for cough, shortness of breath and wheezing. Cardiovascular: Positive for chest pain (as in HPI). Negative for palpitations. [place]   Gastrointestinal: Positive for abdominal pain (as in HPI). Negative for abdominal distention, nausea and vomiting. Occasional heartburn   Endocrine: Negative for cold intolerance and heat intolerance. Genitourinary: Negative for dysuria, frequency and urgency. Musculoskeletal: Positive for arthralgias, back pain and gait problem. Skin: Negative for rash and wound. Neurological: Negative for syncope, numbness and headaches. Hematological: Negative. Psychiatric/Behavioral: Negative for agitation and sleep disturbance. The patient is not nervous/anxious.         OBJECTIVE:   Wt Readings from Last 3 Encounters:   12/18/17 125 lb (56.7 kg)   09/18/17 125 lb (56.7 kg)   07/12/17 127 lb 12.8 oz (58 kg)     BP Readings from Last 3 Encounters:   12/18/17 134/70   09/18/17 136/74   07/12/17 126/64       /70 (Site: Left Arm, Position: Sitting, Cuff

## 2017-12-28 RX ORDER — METOPROLOL SUCCINATE 25 MG/1
TABLET, EXTENDED RELEASE ORAL
Qty: 90 TABLET | Refills: 3 | Status: SHIPPED | OUTPATIENT
Start: 2017-12-28 | End: 2019-04-23 | Stop reason: SDUPTHER

## 2018-01-02 RX ORDER — ALPRAZOLAM 0.25 MG/1
0.25 TABLET ORAL 2 TIMES DAILY PRN
Qty: 60 TABLET | Refills: 0 | Status: SHIPPED | OUTPATIENT
Start: 2018-01-02 | End: 2018-08-21 | Stop reason: SDUPTHER

## 2018-02-12 RX ORDER — ESOMEPRAZOLE MAGNESIUM 40 MG/1
40 CAPSULE, DELAYED RELEASE ORAL DAILY
Qty: 90 CAPSULE | Refills: 3 | Status: SHIPPED | OUTPATIENT
Start: 2018-02-12 | End: 2019-06-14 | Stop reason: SDUPTHER

## 2018-03-23 RX ORDER — FAMOTIDINE 20 MG/1
TABLET, FILM COATED ORAL
Qty: 180 TABLET | Refills: 3 | Status: SHIPPED | OUTPATIENT
Start: 2018-03-23 | End: 2018-05-07 | Stop reason: SDUPTHER

## 2018-04-23 DIAGNOSIS — I10 ESSENTIAL HYPERTENSION: Primary | ICD-10-CM

## 2018-04-23 RX ORDER — HYDRALAZINE HYDROCHLORIDE 25 MG/1
25 TABLET, FILM COATED ORAL 4 TIMES DAILY
Qty: 90 TABLET | Refills: 3 | Status: SHIPPED | OUTPATIENT
Start: 2018-04-23 | End: 2019-02-27 | Stop reason: SDUPTHER

## 2018-05-07 ENCOUNTER — OFFICE VISIT (OUTPATIENT)
Dept: PRIMARY CARE CLINIC | Age: 83
End: 2018-05-07
Payer: MEDICARE

## 2018-05-07 VITALS
DIASTOLIC BLOOD PRESSURE: 80 MMHG | OXYGEN SATURATION: 96 % | RESPIRATION RATE: 18 BRPM | BODY MASS INDEX: 22.1 KG/M2 | HEART RATE: 67 BPM | WEIGHT: 122.8 LBS | SYSTOLIC BLOOD PRESSURE: 140 MMHG

## 2018-05-07 DIAGNOSIS — M51.36 DEGENERATIVE DISC DISEASE, LUMBAR: ICD-10-CM

## 2018-05-07 DIAGNOSIS — I27.20 PULMONARY HTN (HCC): ICD-10-CM

## 2018-05-07 DIAGNOSIS — I10 ESSENTIAL HYPERTENSION: Primary | ICD-10-CM

## 2018-05-07 DIAGNOSIS — F41.9 ANXIETY: ICD-10-CM

## 2018-05-07 DIAGNOSIS — I49.1 PAC (PREMATURE ATRIAL CONTRACTION): ICD-10-CM

## 2018-05-07 DIAGNOSIS — E53.8 B12 DEFICIENCY: ICD-10-CM

## 2018-05-07 DIAGNOSIS — R07.89 ATYPICAL CHEST PAIN: ICD-10-CM

## 2018-05-07 PROCEDURE — 1090F PRES/ABSN URINE INCON ASSESS: CPT | Performed by: INTERNAL MEDICINE

## 2018-05-07 PROCEDURE — 1123F ACP DISCUSS/DSCN MKR DOCD: CPT | Performed by: INTERNAL MEDICINE

## 2018-05-07 PROCEDURE — 1036F TOBACCO NON-USER: CPT | Performed by: INTERNAL MEDICINE

## 2018-05-07 PROCEDURE — 4040F PNEUMOC VAC/ADMIN/RCVD: CPT | Performed by: INTERNAL MEDICINE

## 2018-05-07 PROCEDURE — 99213 OFFICE O/P EST LOW 20 MIN: CPT | Performed by: INTERNAL MEDICINE

## 2018-05-07 PROCEDURE — G8420 CALC BMI NORM PARAMETERS: HCPCS | Performed by: INTERNAL MEDICINE

## 2018-05-07 PROCEDURE — G8427 DOCREV CUR MEDS BY ELIG CLIN: HCPCS | Performed by: INTERNAL MEDICINE

## 2018-05-07 ASSESSMENT — ENCOUNTER SYMPTOMS
BACK PAIN: 1
EYE DISCHARGE: 0
WHEEZING: 0
SHORTNESS OF BREATH: 0
COUGH: 0
NAUSEA: 0
SORE THROAT: 0
VOMITING: 0
SINUS PRESSURE: 0
ABDOMINAL PAIN: 0

## 2018-05-09 RX ORDER — FAMOTIDINE 20 MG/1
20 TABLET, FILM COATED ORAL 2 TIMES DAILY
Qty: 180 TABLET | Refills: 3 | Status: SHIPPED | OUTPATIENT
Start: 2018-05-09 | End: 2019-05-28 | Stop reason: SDUPTHER

## 2018-05-15 ENCOUNTER — OFFICE VISIT (OUTPATIENT)
Dept: CARDIOLOGY | Facility: CLINIC | Age: 83
End: 2018-05-15

## 2018-05-15 ENCOUNTER — NURSE ONLY (OUTPATIENT)
Dept: PRIMARY CARE CLINIC | Age: 83
End: 2018-05-15
Payer: MEDICARE

## 2018-05-15 VITALS
WEIGHT: 123 LBS | HEIGHT: 62 IN | SYSTOLIC BLOOD PRESSURE: 120 MMHG | HEART RATE: 84 BPM | BODY MASS INDEX: 22.63 KG/M2 | DIASTOLIC BLOOD PRESSURE: 86 MMHG

## 2018-05-15 DIAGNOSIS — E78.2 MIXED HYPERLIPIDEMIA: ICD-10-CM

## 2018-05-15 DIAGNOSIS — R07.2 PRECORDIAL PAIN: Primary | ICD-10-CM

## 2018-05-15 DIAGNOSIS — E53.8 B12 DEFICIENCY: Primary | ICD-10-CM

## 2018-05-15 DIAGNOSIS — I10 ESSENTIAL HYPERTENSION: ICD-10-CM

## 2018-05-15 PROCEDURE — 99204 OFFICE O/P NEW MOD 45 MIN: CPT | Performed by: INTERNAL MEDICINE

## 2018-05-15 PROCEDURE — 96372 THER/PROPH/DIAG INJ SC/IM: CPT | Performed by: INTERNAL MEDICINE

## 2018-05-15 RX ORDER — HYDRALAZINE HYDROCHLORIDE 25 MG/1
25 TABLET, FILM COATED ORAL 2 TIMES DAILY
COMMUNITY

## 2018-05-15 RX ORDER — LOSARTAN POTASSIUM 100 MG/1
100 TABLET ORAL DAILY
COMMUNITY

## 2018-05-15 RX ORDER — CYANOCOBALAMIN 1000 UG/ML
1000 INJECTION INTRAMUSCULAR; SUBCUTANEOUS ONCE
Status: COMPLETED | OUTPATIENT
Start: 2018-05-15 | End: 2018-05-15

## 2018-05-15 RX ADMIN — CYANOCOBALAMIN 1000 MCG: 1000 INJECTION INTRAMUSCULAR; SUBCUTANEOUS at 10:44

## 2018-05-15 NOTE — PROGRESS NOTES
[facility]  Consultation H&P  Gill Guerrero  10/14/1930  [phone]    VISIT DATE:  05/15/2018    PCP: Glory Amado MD  [address] [place], [place] [id number]    CC:  Chief Complaint   Patient presents with   • Chest Pain     New Patient       ASSESSMENT:   Diagnosis Plan   1. Precordial pain     2. Essential hypertension     3. Mixed hyperlipidemia         PLAN:  Atypical chest pain: Currently stable and asymptomatic. Not recommending an ischemia evaluation at this time. Continue afterload reduction. She was instructed to begin taking a low-dose aspirin every other day for primary prevention of stroke and myocardial infarction.    Hypertension, labile: Recommending continuing to allow permissive hypertension, reasonable goal blood pressure less than 150/90 mmHg, agree with as needed clonidine. We discussed potentially adding Aldactone to her regimen if her blood pressure worsens.     Hyperlipidemia: Continue dietary modifications. I do not think pharmacologic therapy at her advanced age and the absence of secondary prevention would be beneficial and would likely only induce side effects and worsen polypharmacy.    Pulmonary hypertension: Relatively benign cardiopulmonary exam today. No evidence of right-sided heart failure and not requiring supplemental oxygen. Not recommending further imaging evaluation at this time.    History of Present Illness   Gill is a very delightful 87-year-old female with a history of atypical chest pain, premature atrial contractions, labile hypertension and previously diagnosed pulmonary hypertension. She has been experiencing some gradually increasing frailty and weakness with advancing age. She has a long-standing history of labile hypertension which she says she has dealt with for many years. Currently we are allowing for permissive hypertension with a goal blood pressure less than 150/90 mmHg and takes as needed clonidine in addition to her regular  "medical therapy. She had a couple episodes of chest discomfort 1-2 months ago. Onset at rest. Potentially induced by certain types of foods. Last for one to 2 hours. Nonradiating mild to moderate intensity. Not associated with exertion. She is not had any further episodes of chest discomfort. She is compliant with medical therapy. Has had a history of significantly elevated LDL however has not tolerated statin therapy in the past. No history of stroke or myocardial infarction. Even though she is gradually having physical decline she has very preserved cognitive abilities.    PHYSICAL EXAMINATION:  Vitals:    05/15/18 0918   BP: 120/86   BP Location: Left arm   Patient Position: Sitting   Pulse: 84   Weight: 55.8 kg (123 lb)   Height: 157.5 cm (62\")     General Appearance:    Alert, cooperative, no distress, appears stated age   Head:    Normocephalic, without obvious abnormality, atraumatic   Eyes:    conjunctiva/corneas clear, EOM's intact, fundi     benign, both eyes   Ears:    Normal TM's and external ear canals, both ears   Nose:   Nares normal, septum midline, mucosa normal, no drainage    or sinus tenderness   Throat:   Lips, mucosa, and tongue normal; teeth and gums normal   Neck:   Supple, symmetrical, trachea midline, no adenopathy;     thyroid:  no enlargement/tenderness/nodules; no carotid    bruit or JVD   Back:     Symmetric, no curvature, ROM normal, no CVA tenderness   Lungs:     Minimal left basilar inspiratory rales, otherwise Clear to auscultation bilaterally, respirations unlabored   Chest Wall:    No tenderness or deformity    Heart:    Regular rate and rhythm, S1 and S2 normal, no murmur, rub   or gallop, normal carotid impulse bilaterally without bruit.   Abdomen:     Soft, non-tender, bowel sounds active all four quadrants,     no masses, no organomegaly   Extremities:   Extremities normal, atraumatic, no cyanosis, 1+ right ankle edema    Pulses:   2+ and symmetric all extremities   Skin:   " Skin color, texture, turgor normal, no rashes or lesions   Lymph nodes:   Cervical, supraclavicular, and axillary nodes normal   Neurologic:   normal strength, sensation intact     throughout       Diagnostic Data:  Procedures  No results found for: CHLPL, TRIG, HDL, LDLDIRECT  No results found for: GLUCOSE, BUN, CREATININE, NA, K, CL, CO2, CREATININE, BUN  No results found for: HGBA1C  No results found for: WBC, HGB, HCT, PLT    PROBLEM LIST:  Patient Active Problem List   Diagnosis   • Precordial pain   • Essential hypertension   • Mixed hyperlipidemia       PAST MEDICAL HX  Past Medical History:   Diagnosis Date   • Anemia    • Anxiety    • Depression    • GERD (gastroesophageal reflux disease)    • Osteopenia    • Urinary tract infection        Allergies  Allergies   Allergen Reactions   • Cephalexin    • Chocolate    • Erythromycin    • Iodine    • Nuts    • Penicillins    • Shellfish-Derived Products    • Tramadol Itching       Current Medications    Current Outpatient Prescriptions:   •  ALPRAZolam (XANAX) 0.25 MG tablet, Take  by mouth As Needed., Disp: , Rfl:   •  carvedilol (COREG) 25 MG tablet, take 1 tablet by mouth twice a day, Disp: , Rfl:   •  CloNIDine (CATAPRES) 0.2 MG tablet, take 1 tablet by mouth twice a day, Disp: , Rfl:   •  esomeprazole (nexIUM) 40 MG capsule, Take  by mouth., Disp: , Rfl:   •  estradiol (ESTRACE) 0.1 MG/GM vaginal cream, Insert  into the vagina Daily., Disp: , Rfl:   •  famotidine (PEPCID) 20 MG tablet, Take  by mouth., Disp: , Rfl:   •  hydrALAZINE (APRESOLINE) 25 MG tablet, Take 25 mg by mouth 3 (Three) Times a Day., Disp: , Rfl:   •  losartan (COZAAR) 100 MG tablet, Take 100 mg by mouth Daily., Disp: , Rfl:   •  metoprolol succinate XL (TOPROL-XL) 25 MG 24 hr tablet, take 1 tablet by mouth once daily, Disp: , Rfl:   •  metroNIDAZOLE (METROGEL) 1 % gel, Bid As directed for 4 weeks, Disp: , Rfl:   •  Multiple Vitamins-Minerals (MULTIVITAMIN ADULT PO), Take  by mouth., Disp:  , Rfl:   •  NIFEdipine XL (PROCARDIA XL) 30 MG 24 hr tablet, Take  by mouth., Disp: , Rfl:   •  sertraline (ZOLOFT) 100 MG tablet, Take two pills daily., Disp: , Rfl:          ROS  Review of Systems   Constitution: Positive for weakness.   Cardiovascular: Positive for dyspnea on exertion, irregular heartbeat and leg swelling.   Respiratory: Positive for shortness of breath.    Neurological: Positive for dizziness.     All other body systems reviewed and are negative    SOCIAL HX  Social History     Social History   • Marital status:      Spouse name: N/A   • Number of children: N/A   • Years of education: N/A     Occupational History   • Not on file.     Social History Main Topics   • Smoking status: Former Smoker   • Smokeless tobacco: Never Used   • Alcohol use No   • Drug use: No   • Sexual activity: Defer     Other Topics Concern   • Not on file     Social History Narrative   • No narrative on file       FAMILY HX  Family History   Problem Relation Age of Onset   • Coronary artery disease Father    • Hyperlipidemia Father    • Coronary artery disease Brother    • Hyperlipidemia Brother    • Coronary artery disease Sister    • Hyperlipidemia Sister    • Hypertension Sister    • Hyperlipidemia Paternal Grandfather    • Hyperlipidemia Paternal Grandmother    • Hyperlipidemia Maternal Grandmother    • Hyperlipidemia Maternal Grandfather              Raleigh Ordaz III, MD, FACC

## 2018-06-25 ENCOUNTER — OFFICE VISIT (OUTPATIENT)
Dept: PRIMARY CARE CLINIC | Age: 83
End: 2018-06-25
Payer: MEDICARE

## 2018-06-25 VITALS
DIASTOLIC BLOOD PRESSURE: 68 MMHG | BODY MASS INDEX: 21.81 KG/M2 | OXYGEN SATURATION: 95 % | WEIGHT: 121.2 LBS | RESPIRATION RATE: 18 BRPM | SYSTOLIC BLOOD PRESSURE: 140 MMHG | HEART RATE: 78 BPM

## 2018-06-25 DIAGNOSIS — I49.1 PAC (PREMATURE ATRIAL CONTRACTION): ICD-10-CM

## 2018-06-25 DIAGNOSIS — E53.8 B12 DEFICIENCY: ICD-10-CM

## 2018-06-25 DIAGNOSIS — M51.36 DEGENERATIVE DISC DISEASE, LUMBAR: ICD-10-CM

## 2018-06-25 DIAGNOSIS — I10 ESSENTIAL HYPERTENSION: Primary | ICD-10-CM

## 2018-06-25 DIAGNOSIS — F32.A DEPRESSION, UNSPECIFIED DEPRESSION TYPE: ICD-10-CM

## 2018-06-25 PROCEDURE — 4040F PNEUMOC VAC/ADMIN/RCVD: CPT | Performed by: INTERNAL MEDICINE

## 2018-06-25 PROCEDURE — G8427 DOCREV CUR MEDS BY ELIG CLIN: HCPCS | Performed by: INTERNAL MEDICINE

## 2018-06-25 PROCEDURE — 1123F ACP DISCUSS/DSCN MKR DOCD: CPT | Performed by: INTERNAL MEDICINE

## 2018-06-25 PROCEDURE — 1090F PRES/ABSN URINE INCON ASSESS: CPT | Performed by: INTERNAL MEDICINE

## 2018-06-25 PROCEDURE — G8420 CALC BMI NORM PARAMETERS: HCPCS | Performed by: INTERNAL MEDICINE

## 2018-06-25 PROCEDURE — 1036F TOBACCO NON-USER: CPT | Performed by: INTERNAL MEDICINE

## 2018-06-25 PROCEDURE — 99213 OFFICE O/P EST LOW 20 MIN: CPT | Performed by: INTERNAL MEDICINE

## 2018-06-25 PROCEDURE — 96372 THER/PROPH/DIAG INJ SC/IM: CPT | Performed by: INTERNAL MEDICINE

## 2018-06-25 RX ORDER — ESCITALOPRAM OXALATE 10 MG/1
10 TABLET ORAL DAILY
Qty: 30 TABLET | Refills: 3 | Status: SHIPPED | OUTPATIENT
Start: 2018-06-25 | End: 2018-06-25 | Stop reason: SDUPTHER

## 2018-06-25 RX ORDER — ESTRADIOL 0.1 MG/G
1 CREAM VAGINAL DAILY
Qty: 3 TUBE | Refills: 3 | Status: SHIPPED | OUTPATIENT
Start: 2018-06-25 | End: 2019-01-02 | Stop reason: SDUPTHER

## 2018-06-25 RX ORDER — CYANOCOBALAMIN 1000 UG/ML
1000 INJECTION INTRAMUSCULAR; SUBCUTANEOUS ONCE
Status: COMPLETED | OUTPATIENT
Start: 2018-06-25 | End: 2018-06-25

## 2018-06-25 RX ORDER — ESCITALOPRAM OXALATE 10 MG/1
10 TABLET ORAL DAILY
Qty: 30 TABLET | Refills: 3 | Status: SHIPPED | OUTPATIENT
Start: 2018-06-25 | End: 2019-01-02

## 2018-06-25 RX ADMIN — CYANOCOBALAMIN 1000 MCG: 1000 INJECTION INTRAMUSCULAR; SUBCUTANEOUS at 15:56

## 2018-06-25 ASSESSMENT — PATIENT HEALTH QUESTIONNAIRE - PHQ9
2. FEELING DOWN, DEPRESSED OR HOPELESS: 1
SUM OF ALL RESPONSES TO PHQ9 QUESTIONS 1 & 2: 2
SUM OF ALL RESPONSES TO PHQ QUESTIONS 1-9: 2
1. LITTLE INTEREST OR PLEASURE IN DOING THINGS: 1

## 2018-06-25 ASSESSMENT — ENCOUNTER SYMPTOMS
SHORTNESS OF BREATH: 0
SINUS PRESSURE: 0
ABDOMINAL PAIN: 0
NAUSEA: 0
COUGH: 0
WHEEZING: 0
VOMITING: 0
BACK PAIN: 1
SORE THROAT: 0
EYE DISCHARGE: 0

## 2018-08-20 ENCOUNTER — NURSE ONLY (OUTPATIENT)
Dept: PRIMARY CARE CLINIC | Age: 83
End: 2018-08-20
Payer: MEDICARE

## 2018-08-20 ENCOUNTER — HOSPITAL ENCOUNTER (OUTPATIENT)
Facility: HOSPITAL | Age: 83
Discharge: HOME | End: 2018-08-20
Payer: MEDICARE

## 2018-08-20 DIAGNOSIS — I10 ESSENTIAL HYPERTENSION: ICD-10-CM

## 2018-08-20 DIAGNOSIS — E53.8 B12 DEFICIENCY: Primary | ICD-10-CM

## 2018-08-20 DIAGNOSIS — R30.0 DYSURIA: ICD-10-CM

## 2018-08-20 LAB
A/G RATIO: 1.7 (ref 0.8–2)
ALBUMIN SERPL-MCNC: 4.3 G/DL (ref 3.4–4.8)
ALP BLD-CCNC: 94 U/L (ref 25–100)
ALT SERPL-CCNC: 9 U/L (ref 4–36)
ANION GAP SERPL CALCULATED.3IONS-SCNC: 11 MMOL/L (ref 3–16)
AST SERPL-CCNC: 20 U/L (ref 8–33)
BASOPHILS ABSOLUTE: 0.1 K/UL (ref 0–0.1)
BASOPHILS RELATIVE PERCENT: 0.8 %
BILIRUB SERPL-MCNC: 0.5 MG/DL (ref 0.3–1.2)
BILIRUBIN, POC: ABNORMAL
BLOOD URINE, POC: ABNORMAL
BUN BLDV-MCNC: 16 MG/DL (ref 6–20)
CALCIUM SERPL-MCNC: 10 MG/DL (ref 8.5–10.5)
CHLORIDE BLD-SCNC: 97 MMOL/L (ref 98–107)
CLARITY, POC: CLEAR
CO2: 31 MMOL/L (ref 20–30)
COLOR, POC: YELLOW
CREAT SERPL-MCNC: 0.7 MG/DL (ref 0.4–1.2)
EOSINOPHILS ABSOLUTE: 0.2 K/UL (ref 0–0.4)
EOSINOPHILS RELATIVE PERCENT: 2.9 %
FOLATE: >20 NG/ML
GFR AFRICAN AMERICAN: >59
GFR NON-AFRICAN AMERICAN: >60
GLOBULIN: 2.6 G/DL
GLUCOSE BLD-MCNC: 97 MG/DL (ref 74–106)
GLUCOSE URINE, POC: ABNORMAL
HCT VFR BLD CALC: 44.2 % (ref 37–47)
HEMOGLOBIN: 14 G/DL (ref 11.5–16.5)
IMMATURE GRANULOCYTES #: 0 K/UL
IMMATURE GRANULOCYTES %: 0.3 % (ref 0–5)
KETONES, POC: ABNORMAL
LEUKOCYTE EST, POC: ABNORMAL
LYMPHOCYTES ABSOLUTE: 1.4 K/UL (ref 1.5–4)
LYMPHOCYTES RELATIVE PERCENT: 19.3 %
MCH RBC QN AUTO: 30.4 PG (ref 27–32)
MCHC RBC AUTO-ENTMCNC: 31.7 G/DL (ref 31–35)
MCV RBC AUTO: 95.9 FL (ref 80–100)
MONOCYTES ABSOLUTE: 0.6 K/UL (ref 0.2–0.8)
MONOCYTES RELATIVE PERCENT: 8.2 %
NEUTROPHILS ABSOLUTE: 5.1 K/UL (ref 2–7.5)
NEUTROPHILS RELATIVE PERCENT: 68.5 %
NITRITE, POC: ABNORMAL
PDW BLD-RTO: 13.2 % (ref 11–16)
PH, POC: 6.5
PLATELET # BLD: 313 K/UL (ref 150–400)
PMV BLD AUTO: 10.3 FL (ref 6–10)
POTASSIUM SERPL-SCNC: 4.4 MMOL/L (ref 3.4–5.1)
PROTEIN, POC: ABNORMAL
RBC # BLD: 4.61 M/UL (ref 3.8–5.8)
SODIUM BLD-SCNC: 139 MMOL/L (ref 136–145)
SPECIFIC GRAVITY, POC: 1.01
TOTAL PROTEIN: 6.9 G/DL (ref 6.4–8.3)
TSH SERPL DL<=0.05 MIU/L-ACNC: 3.44 UIU/ML (ref 0.35–5.5)
UROBILINOGEN, POC: 0.2
WBC # BLD: 7.5 K/UL (ref 4–11)

## 2018-08-20 PROCEDURE — 80053 COMPREHEN METABOLIC PANEL: CPT

## 2018-08-20 PROCEDURE — 81002 URINALYSIS NONAUTO W/O SCOPE: CPT | Performed by: INTERNAL MEDICINE

## 2018-08-20 PROCEDURE — 96372 THER/PROPH/DIAG INJ SC/IM: CPT | Performed by: INTERNAL MEDICINE

## 2018-08-20 PROCEDURE — 82746 ASSAY OF FOLIC ACID SERUM: CPT

## 2018-08-20 PROCEDURE — 36415 COLL VENOUS BLD VENIPUNCTURE: CPT

## 2018-08-20 PROCEDURE — 84443 ASSAY THYROID STIM HORMONE: CPT

## 2018-08-20 PROCEDURE — 85025 COMPLETE CBC W/AUTO DIFF WBC: CPT

## 2018-08-20 RX ORDER — NITROFURANTOIN 25; 75 MG/1; MG/1
100 CAPSULE ORAL 2 TIMES DAILY
Qty: 14 CAPSULE | Refills: 0 | Status: SHIPPED | OUTPATIENT
Start: 2018-08-20 | End: 2018-12-10 | Stop reason: SDUPTHER

## 2018-08-20 RX ORDER — CYANOCOBALAMIN 1000 UG/ML
1000 INJECTION INTRAMUSCULAR; SUBCUTANEOUS ONCE
Status: COMPLETED | OUTPATIENT
Start: 2018-08-20 | End: 2018-08-20

## 2018-08-20 RX ADMIN — CYANOCOBALAMIN 1000 MCG: 1000 INJECTION INTRAMUSCULAR; SUBCUTANEOUS at 11:58

## 2018-08-21 ENCOUNTER — OFFICE VISIT (OUTPATIENT)
Dept: PRIMARY CARE CLINIC | Age: 83
End: 2018-08-21
Payer: MEDICARE

## 2018-08-21 VITALS
SYSTOLIC BLOOD PRESSURE: 134 MMHG | WEIGHT: 122.8 LBS | BODY MASS INDEX: 22.1 KG/M2 | OXYGEN SATURATION: 96 % | HEART RATE: 78 BPM | RESPIRATION RATE: 18 BRPM | DIASTOLIC BLOOD PRESSURE: 62 MMHG

## 2018-08-21 DIAGNOSIS — I10 ESSENTIAL HYPERTENSION: ICD-10-CM

## 2018-08-21 DIAGNOSIS — R00.2 PALPITATION: Primary | ICD-10-CM

## 2018-08-21 DIAGNOSIS — M51.36 DEGENERATIVE DISC DISEASE, LUMBAR: ICD-10-CM

## 2018-08-21 DIAGNOSIS — F32.A DEPRESSION, UNSPECIFIED DEPRESSION TYPE: ICD-10-CM

## 2018-08-21 DIAGNOSIS — I49.1 PAC (PREMATURE ATRIAL CONTRACTION): ICD-10-CM

## 2018-08-21 DIAGNOSIS — Z87.898 HISTORY OF CHEST PAIN: ICD-10-CM

## 2018-08-21 DIAGNOSIS — M48.061 SPINAL STENOSIS OF LUMBAR REGION, UNSPECIFIED WHETHER NEUROGENIC CLAUDICATION PRESENT: ICD-10-CM

## 2018-08-21 PROCEDURE — 1123F ACP DISCUSS/DSCN MKR DOCD: CPT | Performed by: INTERNAL MEDICINE

## 2018-08-21 PROCEDURE — 1090F PRES/ABSN URINE INCON ASSESS: CPT | Performed by: INTERNAL MEDICINE

## 2018-08-21 PROCEDURE — 99214 OFFICE O/P EST MOD 30 MIN: CPT | Performed by: INTERNAL MEDICINE

## 2018-08-21 PROCEDURE — G8420 CALC BMI NORM PARAMETERS: HCPCS | Performed by: INTERNAL MEDICINE

## 2018-08-21 PROCEDURE — 1101F PT FALLS ASSESS-DOCD LE1/YR: CPT | Performed by: INTERNAL MEDICINE

## 2018-08-21 PROCEDURE — G8427 DOCREV CUR MEDS BY ELIG CLIN: HCPCS | Performed by: INTERNAL MEDICINE

## 2018-08-21 PROCEDURE — 4040F PNEUMOC VAC/ADMIN/RCVD: CPT | Performed by: INTERNAL MEDICINE

## 2018-08-21 PROCEDURE — 1036F TOBACCO NON-USER: CPT | Performed by: INTERNAL MEDICINE

## 2018-08-21 RX ORDER — HYDROCODONE BITARTRATE AND ACETAMINOPHEN 10; 325 MG/1; MG/1
0.5 TABLET ORAL EVERY 8 HOURS PRN
Qty: 30 TABLET | Refills: 0 | Status: SHIPPED | OUTPATIENT
Start: 2018-08-21 | End: 2018-08-24 | Stop reason: SDUPTHER

## 2018-08-21 RX ORDER — PROMETHAZINE HYDROCHLORIDE 12.5 MG/1
12.5 TABLET ORAL EVERY 6 HOURS PRN
Qty: 30 TABLET | Refills: 1 | Status: SHIPPED | OUTPATIENT
Start: 2018-08-21 | End: 2018-08-24 | Stop reason: SDUPTHER

## 2018-08-21 RX ORDER — ALPRAZOLAM 0.25 MG/1
0.25 TABLET ORAL 2 TIMES DAILY PRN
Qty: 60 TABLET | Refills: 0 | Status: SHIPPED | OUTPATIENT
Start: 2018-08-21 | End: 2018-08-24 | Stop reason: SDUPTHER

## 2018-08-21 ASSESSMENT — ENCOUNTER SYMPTOMS
WHEEZING: 0
BACK PAIN: 1
ABDOMINAL PAIN: 0
SORE THROAT: 0
NAUSEA: 0
VOMITING: 0
EYE DISCHARGE: 0
SINUS PRESSURE: 0
COUGH: 0
SHORTNESS OF BREATH: 0

## 2018-08-21 NOTE — PROGRESS NOTES
Have you seen any other physician or provider since your last visit no    Have you had any other diagnostic tests since your last visit? Labs     Have you changed or stopped any medications since your last visit? no         Pt is here for fu on htn, depression, back pain.
famotidine (PEPCID) 20 MG tablet Take 1 tablet by mouth 2 times daily 180 tablet 3    hydrALAZINE (APRESOLINE) 25 MG tablet Take 1 tablet by mouth 4 times daily (Patient taking differently: Take 25 mg by mouth daily ) 90 tablet 3    esomeprazole (NEXIUM) 40 MG delayed release capsule Take 1 capsule by mouth daily 90 capsule 3    ALPRAZolam (XANAX) 0.25 MG tablet Take 1 tablet by mouth 2 times daily as needed for Sleep or Anxiety for up to 30 days. 60 tablet 0    metoprolol succinate (TOPROL XL) 25 MG extended release tablet take 1 tablet by mouth once daily 90 tablet 3    carvedilol (COREG) 25 MG tablet take 1 tablet by mouth twice a day 180 tablet 3    losartan (COZAAR) 100 MG tablet Take 1 tablet by mouth daily 30 tablet 3    NIFEdipine (PROCARDIA XL) 30 MG extended release tablet take 1 tablet by mouth once daily 90 tablet 3    metroNIDAZOLE (METROGEL) 1 % gel Bid As directed for 4 weeks 1 Tube 1    multivitamin (THERAGRAN) per tablet Take 1 tablet by mouth daily. Review of Systems   Constitutional: Positive for fatigue. Negative for chills and fever. HENT: Negative for congestion, sinus pressure and sore throat. Eyes: Negative for discharge and visual disturbance. Respiratory: Negative for cough, shortness of breath and wheezing. Cardiovascular: Positive for chest pain (hx ) and palpitations. Negative for leg swelling. [place]   Gastrointestinal: Negative for abdominal pain, nausea and vomiting. Endocrine: Negative for cold intolerance and heat intolerance. Genitourinary: Negative for dysuria, frequency and urgency. Musculoskeletal: Positive for arthralgias, back pain and gait problem. Skin: Negative for rash and wound. Neurological: Negative for syncope, numbness and headaches. Hematological: Negative. Psychiatric/Behavioral: Positive for sleep disturbance. Negative for agitation, self-injury and suicidal ideas. The patient is nervous/anxious.          Depressed

## 2018-08-23 DIAGNOSIS — M48.061 SPINAL STENOSIS OF LUMBAR REGION, UNSPECIFIED WHETHER NEUROGENIC CLAUDICATION PRESENT: ICD-10-CM

## 2018-08-23 DIAGNOSIS — M51.36 DEGENERATIVE DISC DISEASE, LUMBAR: ICD-10-CM

## 2018-08-23 DIAGNOSIS — F32.A DEPRESSION, UNSPECIFIED DEPRESSION TYPE: ICD-10-CM

## 2018-08-23 RX ORDER — ALPRAZOLAM 0.25 MG/1
0.25 TABLET ORAL 2 TIMES DAILY PRN
Qty: 60 TABLET | Refills: 0 | OUTPATIENT
Start: 2018-08-23 | End: 2018-09-22

## 2018-08-23 RX ORDER — HYDROCODONE BITARTRATE AND ACETAMINOPHEN 10; 325 MG/1; MG/1
0.5 TABLET ORAL EVERY 8 HOURS PRN
Qty: 30 TABLET | Refills: 0 | OUTPATIENT
Start: 2018-08-23 | End: 2018-09-22

## 2018-08-23 RX ORDER — PROMETHAZINE HYDROCHLORIDE 12.5 MG/1
12.5 TABLET ORAL EVERY 6 HOURS PRN
Qty: 30 TABLET | Refills: 1 | OUTPATIENT
Start: 2018-08-23 | End: 2018-08-30

## 2018-08-24 ENCOUNTER — TELEPHONE (OUTPATIENT)
Dept: PRIMARY CARE CLINIC | Age: 83
End: 2018-08-24

## 2018-08-24 DIAGNOSIS — F32.A DEPRESSION, UNSPECIFIED DEPRESSION TYPE: ICD-10-CM

## 2018-08-24 DIAGNOSIS — M51.36 DEGENERATIVE DISC DISEASE, LUMBAR: ICD-10-CM

## 2018-08-24 DIAGNOSIS — M48.061 SPINAL STENOSIS OF LUMBAR REGION, UNSPECIFIED WHETHER NEUROGENIC CLAUDICATION PRESENT: ICD-10-CM

## 2018-08-24 RX ORDER — ALPRAZOLAM 0.25 MG/1
0.25 TABLET ORAL 2 TIMES DAILY PRN
Qty: 60 TABLET | Refills: 0 | Status: SHIPPED | OUTPATIENT
Start: 2018-08-24 | End: 2019-02-27 | Stop reason: SDUPTHER

## 2018-08-24 RX ORDER — HYDROCODONE BITARTRATE AND ACETAMINOPHEN 10; 325 MG/1; MG/1
0.5 TABLET ORAL EVERY 8 HOURS PRN
Qty: 30 TABLET | Refills: 0 | Status: SHIPPED | OUTPATIENT
Start: 2018-08-24 | End: 2018-09-23

## 2018-08-24 RX ORDER — PROMETHAZINE HYDROCHLORIDE 12.5 MG/1
12.5 TABLET ORAL EVERY 6 HOURS PRN
Qty: 30 TABLET | Refills: 1 | Status: SHIPPED | OUTPATIENT
Start: 2018-08-24 | End: 2018-08-31

## 2018-08-30 ENCOUNTER — HOSPITAL ENCOUNTER (OUTPATIENT)
Dept: RESPIRATORY THERAPY | Facility: HOSPITAL | Age: 83
Discharge: HOME | End: 2018-08-30
Payer: MEDICARE

## 2018-08-30 DIAGNOSIS — Z87.898 HISTORY OF CHEST PAIN: ICD-10-CM

## 2018-08-30 DIAGNOSIS — I10 ESSENTIAL HYPERTENSION: ICD-10-CM

## 2018-08-30 DIAGNOSIS — R00.2 PALPITATION: ICD-10-CM

## 2018-08-30 DIAGNOSIS — I49.1 PAC (PREMATURE ATRIAL CONTRACTION): ICD-10-CM

## 2018-08-30 PROCEDURE — 93271 ECG/MONITORING AND ANALYSIS: CPT

## 2018-09-18 DIAGNOSIS — R00.2 PALPITATIONS: Primary | ICD-10-CM

## 2018-09-20 DIAGNOSIS — B02.9 HERPES ZOSTER WITHOUT COMPLICATION: Primary | ICD-10-CM

## 2018-09-20 RX ORDER — PREGABALIN 50 MG/1
50 CAPSULE ORAL 3 TIMES DAILY
Qty: 90 CAPSULE | Refills: 0 | Status: SHIPPED | OUTPATIENT
Start: 2018-09-20 | End: 2019-01-02

## 2018-09-20 RX ORDER — LIDOCAINE 50 MG/G
1 PATCH TOPICAL DAILY
Qty: 30 PATCH | Refills: 0 | Status: SHIPPED | OUTPATIENT
Start: 2018-09-20 | End: 2019-01-02

## 2018-09-20 RX ORDER — VALACYCLOVIR HYDROCHLORIDE 1 G/1
1000 TABLET, FILM COATED ORAL 3 TIMES DAILY
Qty: 30 TABLET | Refills: 0 | Status: SHIPPED | OUTPATIENT
Start: 2018-09-20 | End: 2019-01-02

## 2018-10-01 RX ORDER — NIFEDIPINE 30 MG/1
TABLET, EXTENDED RELEASE ORAL
Qty: 90 TABLET | Refills: 3 | Status: SHIPPED | OUTPATIENT
Start: 2018-10-01 | End: 2019-10-15 | Stop reason: SDUPTHER

## 2018-10-18 ENCOUNTER — TELEPHONE (OUTPATIENT)
Dept: PRIMARY CARE CLINIC | Age: 83
End: 2018-10-18

## 2018-10-18 NOTE — TELEPHONE ENCOUNTER
Pt left message requesting an increase of her Lexapro dose. Stated she wants to try 15mg daily. Also states if you have any questions or concerns about increasing it that she would like for you to call her.  [phone]

## 2018-11-15 RX ORDER — CYANOCOBALAMIN 1000 UG/ML
1000 INJECTION INTRAMUSCULAR; SUBCUTANEOUS
Qty: 1 ML | Refills: 10 | Status: SHIPPED | OUTPATIENT
Start: 2018-11-15 | End: 2019-03-21 | Stop reason: SDUPTHER

## 2018-11-15 RX ORDER — CARVEDILOL 25 MG/1
TABLET ORAL
Qty: 180 TABLET | Refills: 3 | Status: SHIPPED | OUTPATIENT
Start: 2018-11-15 | End: 2019-12-10 | Stop reason: SDUPTHER

## 2018-11-19 ENCOUNTER — NURSE ONLY (OUTPATIENT)
Dept: PRIMARY CARE CLINIC | Age: 83
End: 2018-11-19

## 2018-11-19 DIAGNOSIS — Z23 NEED FOR INFLUENZA VACCINATION: Primary | ICD-10-CM

## 2018-11-19 PROCEDURE — 90688 IIV4 VACCINE SPLT 0.5 ML IM: CPT | Performed by: INTERNAL MEDICINE

## 2018-11-19 PROCEDURE — G0008 ADMIN INFLUENZA VIRUS VAC: HCPCS | Performed by: INTERNAL MEDICINE

## 2018-11-30 ENCOUNTER — TELEPHONE (OUTPATIENT)
Dept: PRIMARY CARE CLINIC | Age: 83
End: 2018-11-30

## 2018-12-03 RX ORDER — LOSARTAN POTASSIUM 100 MG/1
TABLET ORAL
Qty: 90 TABLET | Refills: 2 | Status: SHIPPED | OUTPATIENT
Start: 2018-12-03 | End: 2019-08-30 | Stop reason: SDUPTHER

## 2018-12-10 ENCOUNTER — TELEPHONE (OUTPATIENT)
Dept: PRIMARY CARE CLINIC | Age: 83
End: 2018-12-10

## 2018-12-10 RX ORDER — NITROFURANTOIN 25; 75 MG/1; MG/1
100 CAPSULE ORAL 2 TIMES DAILY
Qty: 14 CAPSULE | Refills: 0 | Status: SHIPPED | OUTPATIENT
Start: 2018-12-10 | End: 2018-12-17

## 2019-01-02 ENCOUNTER — OFFICE VISIT (OUTPATIENT)
Dept: PRIMARY CARE CLINIC | Age: 84
End: 2019-01-02
Payer: MEDICARE

## 2019-01-02 VITALS
DIASTOLIC BLOOD PRESSURE: 77 MMHG | BODY MASS INDEX: 22.45 KG/M2 | SYSTOLIC BLOOD PRESSURE: 155 MMHG | TEMPERATURE: 97.8 F | RESPIRATION RATE: 16 BRPM | HEIGHT: 62 IN | WEIGHT: 122 LBS | HEART RATE: 82 BPM | OXYGEN SATURATION: 96 %

## 2019-01-02 DIAGNOSIS — E53.8 B12 DEFICIENCY: ICD-10-CM

## 2019-01-02 DIAGNOSIS — M51.36 DEGENERATIVE DISC DISEASE, LUMBAR: ICD-10-CM

## 2019-01-02 DIAGNOSIS — I10 ESSENTIAL HYPERTENSION: Primary | ICD-10-CM

## 2019-01-02 DIAGNOSIS — F32.A DEPRESSION, UNSPECIFIED DEPRESSION TYPE: ICD-10-CM

## 2019-01-02 DIAGNOSIS — M48.061 SPINAL STENOSIS OF LUMBAR REGION, UNSPECIFIED WHETHER NEUROGENIC CLAUDICATION PRESENT: ICD-10-CM

## 2019-01-02 PROCEDURE — 4040F PNEUMOC VAC/ADMIN/RCVD: CPT | Performed by: INTERNAL MEDICINE

## 2019-01-02 PROCEDURE — 1123F ACP DISCUSS/DSCN MKR DOCD: CPT | Performed by: INTERNAL MEDICINE

## 2019-01-02 PROCEDURE — 99214 OFFICE O/P EST MOD 30 MIN: CPT | Performed by: INTERNAL MEDICINE

## 2019-01-02 PROCEDURE — 1036F TOBACCO NON-USER: CPT | Performed by: INTERNAL MEDICINE

## 2019-01-02 PROCEDURE — G8482 FLU IMMUNIZE ORDER/ADMIN: HCPCS | Performed by: INTERNAL MEDICINE

## 2019-01-02 PROCEDURE — 96372 THER/PROPH/DIAG INJ SC/IM: CPT | Performed by: INTERNAL MEDICINE

## 2019-01-02 PROCEDURE — 1101F PT FALLS ASSESS-DOCD LE1/YR: CPT | Performed by: INTERNAL MEDICINE

## 2019-01-02 PROCEDURE — G8427 DOCREV CUR MEDS BY ELIG CLIN: HCPCS | Performed by: INTERNAL MEDICINE

## 2019-01-02 PROCEDURE — G8420 CALC BMI NORM PARAMETERS: HCPCS | Performed by: INTERNAL MEDICINE

## 2019-01-02 PROCEDURE — 1090F PRES/ABSN URINE INCON ASSESS: CPT | Performed by: INTERNAL MEDICINE

## 2019-01-02 RX ORDER — SERTRALINE HYDROCHLORIDE 100 MG/1
100 TABLET, FILM COATED ORAL DAILY
Qty: 30 TABLET | Refills: 3 | Status: SHIPPED | OUTPATIENT
Start: 2019-01-02 | End: 2019-02-27 | Stop reason: SDUPTHER

## 2019-01-02 RX ORDER — CYANOCOBALAMIN 1000 UG/ML
1000 INJECTION INTRAMUSCULAR; SUBCUTANEOUS ONCE
Status: COMPLETED | OUTPATIENT
Start: 2019-01-02 | End: 2019-01-02

## 2019-01-02 RX ORDER — SPIRONOLACTONE 25 MG/1
25 TABLET ORAL DAILY
Qty: 30 TABLET | Refills: 1 | Status: SHIPPED | OUTPATIENT
Start: 2019-01-02 | End: 2019-02-27 | Stop reason: SDUPTHER

## 2019-01-02 RX ORDER — ESTRADIOL 0.1 MG/G
1 CREAM VAGINAL DAILY
Qty: 3 TUBE | Refills: 3 | Status: SHIPPED | OUTPATIENT
Start: 2019-01-02 | End: 2019-08-26 | Stop reason: SDUPTHER

## 2019-01-02 RX ADMIN — CYANOCOBALAMIN 1000 MCG: 1000 INJECTION INTRAMUSCULAR; SUBCUTANEOUS at 16:58

## 2019-01-02 ASSESSMENT — ENCOUNTER SYMPTOMS
EYE DISCHARGE: 0
SHORTNESS OF BREATH: 0
VOMITING: 0
NAUSEA: 0
BACK PAIN: 1
ABDOMINAL PAIN: 0
WHEEZING: 0
SINUS PRESSURE: 0
COUGH: 0
SORE THROAT: 0

## 2019-01-22 ENCOUNTER — OFFICE VISIT (OUTPATIENT)
Dept: PRIMARY CARE CLINIC | Age: 84
End: 2019-01-22
Payer: MEDICARE

## 2019-01-22 VITALS
DIASTOLIC BLOOD PRESSURE: 74 MMHG | HEART RATE: 90 BPM | BODY MASS INDEX: 22.02 KG/M2 | RESPIRATION RATE: 18 BRPM | OXYGEN SATURATION: 97 % | SYSTOLIC BLOOD PRESSURE: 172 MMHG | WEIGHT: 120.4 LBS

## 2019-01-22 DIAGNOSIS — I10 ESSENTIAL HYPERTENSION: Primary | ICD-10-CM

## 2019-01-22 DIAGNOSIS — F32.A DEPRESSION, UNSPECIFIED DEPRESSION TYPE: ICD-10-CM

## 2019-01-22 PROCEDURE — 1123F ACP DISCUSS/DSCN MKR DOCD: CPT | Performed by: INTERNAL MEDICINE

## 2019-01-22 PROCEDURE — G8482 FLU IMMUNIZE ORDER/ADMIN: HCPCS | Performed by: INTERNAL MEDICINE

## 2019-01-22 PROCEDURE — 1036F TOBACCO NON-USER: CPT | Performed by: INTERNAL MEDICINE

## 2019-01-22 PROCEDURE — G8427 DOCREV CUR MEDS BY ELIG CLIN: HCPCS | Performed by: INTERNAL MEDICINE

## 2019-01-22 PROCEDURE — 4040F PNEUMOC VAC/ADMIN/RCVD: CPT | Performed by: INTERNAL MEDICINE

## 2019-01-22 PROCEDURE — G8420 CALC BMI NORM PARAMETERS: HCPCS | Performed by: INTERNAL MEDICINE

## 2019-01-22 PROCEDURE — 1090F PRES/ABSN URINE INCON ASSESS: CPT | Performed by: INTERNAL MEDICINE

## 2019-01-22 PROCEDURE — 1101F PT FALLS ASSESS-DOCD LE1/YR: CPT | Performed by: INTERNAL MEDICINE

## 2019-01-22 PROCEDURE — 99213 OFFICE O/P EST LOW 20 MIN: CPT | Performed by: INTERNAL MEDICINE

## 2019-01-22 RX ORDER — ALPRAZOLAM 0.25 MG/1
0.25 TABLET ORAL 2 TIMES DAILY PRN
Qty: 60 TABLET | Refills: 0 | Status: SHIPPED | OUTPATIENT
Start: 2019-01-22 | End: 2021-05-27 | Stop reason: SDUPTHER

## 2019-01-22 RX ORDER — CLONIDINE HYDROCHLORIDE 0.1 MG/1
0.1 TABLET ORAL 2 TIMES DAILY
Qty: 180 TABLET | Refills: 1 | Status: SHIPPED | OUTPATIENT
Start: 2019-01-22 | End: 2019-08-26

## 2019-01-22 ASSESSMENT — ENCOUNTER SYMPTOMS
VOMITING: 0
EYE DISCHARGE: 0
COUGH: 0
SHORTNESS OF BREATH: 0
WHEEZING: 0
ABDOMINAL PAIN: 0
SINUS PRESSURE: 0
BACK PAIN: 1
SORE THROAT: 0
NAUSEA: 0

## 2019-02-21 ENCOUNTER — NURSE ONLY (OUTPATIENT)
Dept: PRIMARY CARE CLINIC | Age: 84
End: 2019-02-21
Payer: MEDICARE

## 2019-02-21 ENCOUNTER — HOSPITAL ENCOUNTER (OUTPATIENT)
Facility: HOSPITAL | Age: 84
Discharge: HOME | End: 2019-02-21
Payer: MEDICARE

## 2019-02-21 DIAGNOSIS — I10 ESSENTIAL HYPERTENSION: ICD-10-CM

## 2019-02-21 DIAGNOSIS — E53.8 B12 DEFICIENCY: Primary | ICD-10-CM

## 2019-02-21 LAB
ANION GAP SERPL CALCULATED.3IONS-SCNC: 8 MMOL/L (ref 3–16)
BUN BLDV-MCNC: 14 MG/DL (ref 6–20)
CALCIUM SERPL-MCNC: 9.5 MG/DL (ref 8.5–10.5)
CHLORIDE BLD-SCNC: 97 MMOL/L (ref 98–107)
CO2: 30 MMOL/L (ref 20–30)
CREAT SERPL-MCNC: 0.7 MG/DL (ref 0.4–1.2)
GFR AFRICAN AMERICAN: >59
GFR NON-AFRICAN AMERICAN: >60
GLUCOSE BLD-MCNC: 102 MG/DL (ref 74–106)
MAGNESIUM: 1.8 MG/DL (ref 1.7–2.4)
POTASSIUM SERPL-SCNC: 4.5 MMOL/L (ref 3.4–5.1)
SODIUM BLD-SCNC: 135 MMOL/L (ref 136–145)

## 2019-02-21 PROCEDURE — 80048 BASIC METABOLIC PNL TOTAL CA: CPT

## 2019-02-21 PROCEDURE — 96372 THER/PROPH/DIAG INJ SC/IM: CPT | Performed by: INTERNAL MEDICINE

## 2019-02-21 PROCEDURE — 83735 ASSAY OF MAGNESIUM: CPT

## 2019-02-21 RX ORDER — CYANOCOBALAMIN 1000 UG/ML
1000 INJECTION INTRAMUSCULAR; SUBCUTANEOUS ONCE
Status: COMPLETED | OUTPATIENT
Start: 2019-02-21 | End: 2019-02-21

## 2019-02-21 RX ADMIN — CYANOCOBALAMIN 1000 MCG: 1000 INJECTION INTRAMUSCULAR; SUBCUTANEOUS at 11:59

## 2019-02-27 ENCOUNTER — OFFICE VISIT (OUTPATIENT)
Dept: PRIMARY CARE CLINIC | Age: 84
End: 2019-02-27
Payer: MEDICARE

## 2019-02-27 VITALS
BODY MASS INDEX: 21.95 KG/M2 | RESPIRATION RATE: 18 BRPM | HEART RATE: 78 BPM | OXYGEN SATURATION: 97 % | WEIGHT: 120 LBS | DIASTOLIC BLOOD PRESSURE: 70 MMHG | SYSTOLIC BLOOD PRESSURE: 132 MMHG

## 2019-02-27 DIAGNOSIS — R91.1 LUNG NODULE: ICD-10-CM

## 2019-02-27 DIAGNOSIS — I10 ESSENTIAL HYPERTENSION: Primary | ICD-10-CM

## 2019-02-27 DIAGNOSIS — F32.A DEPRESSION, UNSPECIFIED DEPRESSION TYPE: ICD-10-CM

## 2019-02-27 DIAGNOSIS — I49.1 PAC (PREMATURE ATRIAL CONTRACTION): ICD-10-CM

## 2019-02-27 DIAGNOSIS — M51.36 DEGENERATIVE DISC DISEASE, LUMBAR: ICD-10-CM

## 2019-02-27 DIAGNOSIS — I27.20 PULMONARY HTN (HCC): ICD-10-CM

## 2019-02-27 PROCEDURE — G8482 FLU IMMUNIZE ORDER/ADMIN: HCPCS | Performed by: INTERNAL MEDICINE

## 2019-02-27 PROCEDURE — G8420 CALC BMI NORM PARAMETERS: HCPCS | Performed by: INTERNAL MEDICINE

## 2019-02-27 PROCEDURE — 4040F PNEUMOC VAC/ADMIN/RCVD: CPT | Performed by: INTERNAL MEDICINE

## 2019-02-27 PROCEDURE — 1123F ACP DISCUSS/DSCN MKR DOCD: CPT | Performed by: INTERNAL MEDICINE

## 2019-02-27 PROCEDURE — 1036F TOBACCO NON-USER: CPT | Performed by: INTERNAL MEDICINE

## 2019-02-27 PROCEDURE — 99213 OFFICE O/P EST LOW 20 MIN: CPT | Performed by: INTERNAL MEDICINE

## 2019-02-27 PROCEDURE — 1090F PRES/ABSN URINE INCON ASSESS: CPT | Performed by: INTERNAL MEDICINE

## 2019-02-27 PROCEDURE — G8427 DOCREV CUR MEDS BY ELIG CLIN: HCPCS | Performed by: INTERNAL MEDICINE

## 2019-02-27 RX ORDER — SERTRALINE HYDROCHLORIDE 100 MG/1
200 TABLET, FILM COATED ORAL DAILY
Qty: 180 TABLET | Refills: 3 | Status: SHIPPED | OUTPATIENT
Start: 2019-02-27 | End: 2020-02-19 | Stop reason: SDUPTHER

## 2019-02-27 RX ORDER — SPIRONOLACTONE 25 MG/1
25 TABLET ORAL DAILY
Qty: 90 TABLET | Refills: 2 | Status: SHIPPED | OUTPATIENT
Start: 2019-02-27 | End: 2019-10-24 | Stop reason: SDUPTHER

## 2019-02-27 RX ORDER — HYDRALAZINE HYDROCHLORIDE 25 MG/1
25 TABLET, FILM COATED ORAL DAILY
Qty: 90 TABLET | Refills: 2 | Status: SHIPPED | OUTPATIENT
Start: 2019-02-27 | End: 2020-02-19

## 2019-03-21 RX ORDER — CYANOCOBALAMIN 1000 UG/ML
1000 INJECTION INTRAMUSCULAR; SUBCUTANEOUS
Qty: 1 ML | Refills: 10 | Status: SHIPPED | OUTPATIENT
Start: 2019-03-21 | End: 2019-05-09 | Stop reason: SDUPTHER

## 2019-03-25 ASSESSMENT — ENCOUNTER SYMPTOMS
SINUS PRESSURE: 0
VOMITING: 0
SORE THROAT: 0
ABDOMINAL PAIN: 0
EYE DISCHARGE: 0
BACK PAIN: 1
NAUSEA: 0
WHEEZING: 0
SHORTNESS OF BREATH: 0
COUGH: 0

## 2019-04-24 RX ORDER — METOPROLOL SUCCINATE 25 MG/1
TABLET, EXTENDED RELEASE ORAL
Qty: 90 TABLET | Refills: 3 | Status: SHIPPED | OUTPATIENT
Start: 2019-04-24 | End: 2020-01-20

## 2019-05-09 ENCOUNTER — OFFICE VISIT (OUTPATIENT)
Dept: PRIMARY CARE CLINIC | Age: 84
End: 2019-05-09
Payer: MEDICARE

## 2019-05-09 ENCOUNTER — HOSPITAL ENCOUNTER (OUTPATIENT)
Facility: HOSPITAL | Age: 84
Discharge: HOME | End: 2019-05-09
Payer: MEDICARE

## 2019-05-09 VITALS
RESPIRATION RATE: 18 BRPM | OXYGEN SATURATION: 95 % | BODY MASS INDEX: 21.98 KG/M2 | DIASTOLIC BLOOD PRESSURE: 64 MMHG | SYSTOLIC BLOOD PRESSURE: 124 MMHG | HEART RATE: 81 BPM | WEIGHT: 120.2 LBS

## 2019-05-09 DIAGNOSIS — I49.1 PAC (PREMATURE ATRIAL CONTRACTION): ICD-10-CM

## 2019-05-09 DIAGNOSIS — R53.83 FATIGUE, UNSPECIFIED TYPE: ICD-10-CM

## 2019-05-09 DIAGNOSIS — I10 ESSENTIAL HYPERTENSION: ICD-10-CM

## 2019-05-09 DIAGNOSIS — E53.8 B12 DEFICIENCY: ICD-10-CM

## 2019-05-09 DIAGNOSIS — I27.20 PULMONARY HTN (HCC): ICD-10-CM

## 2019-05-09 DIAGNOSIS — R00.2 PALPITATION: ICD-10-CM

## 2019-05-09 DIAGNOSIS — I10 ESSENTIAL HYPERTENSION: Primary | ICD-10-CM

## 2019-05-09 PROCEDURE — 85025 COMPLETE CBC W/AUTO DIFF WBC: CPT

## 2019-05-09 PROCEDURE — 99214 OFFICE O/P EST MOD 30 MIN: CPT | Performed by: INTERNAL MEDICINE

## 2019-05-09 PROCEDURE — G8420 CALC BMI NORM PARAMETERS: HCPCS | Performed by: INTERNAL MEDICINE

## 2019-05-09 PROCEDURE — 80053 COMPREHEN METABOLIC PANEL: CPT

## 2019-05-09 PROCEDURE — 1036F TOBACCO NON-USER: CPT | Performed by: INTERNAL MEDICINE

## 2019-05-09 PROCEDURE — 84443 ASSAY THYROID STIM HORMONE: CPT

## 2019-05-09 PROCEDURE — 4040F PNEUMOC VAC/ADMIN/RCVD: CPT | Performed by: INTERNAL MEDICINE

## 2019-05-09 PROCEDURE — G8427 DOCREV CUR MEDS BY ELIG CLIN: HCPCS | Performed by: INTERNAL MEDICINE

## 2019-05-09 PROCEDURE — 1123F ACP DISCUSS/DSCN MKR DOCD: CPT | Performed by: INTERNAL MEDICINE

## 2019-05-09 PROCEDURE — 1090F PRES/ABSN URINE INCON ASSESS: CPT | Performed by: INTERNAL MEDICINE

## 2019-05-09 RX ORDER — CYANOCOBALAMIN 1000 UG/ML
1000 INJECTION INTRAMUSCULAR; SUBCUTANEOUS
Qty: 12 ML | Refills: 1 | Status: SHIPPED | OUTPATIENT
Start: 2019-05-09 | End: 2020-04-22 | Stop reason: SDUPTHER

## 2019-05-09 ASSESSMENT — ENCOUNTER SYMPTOMS
WHEEZING: 0
BACK PAIN: 1
SINUS PRESSURE: 0
SORE THROAT: 0
ABDOMINAL PAIN: 0
COUGH: 0
NAUSEA: 0
SHORTNESS OF BREATH: 0
EYE DISCHARGE: 0
VOMITING: 0

## 2019-05-09 NOTE — PROGRESS NOTES
SUBJECTIVE:    Patient ID: Kierra Young is a 80 y. o.female. Chief Complaint   Patient presents with    Hypertension     HPI:  Patient has had hypertension for several years. She has been compliant with taking medications, without side effects from it. She has been following a low-sodium, is not active and never exercises. Weight is stable, compared to last visit. Her blood pressure is stable at this time. She reported that the blood pressure usually slightly elevated early in the morning but trended down sometimes to the lowest normal limits early afternoon. She has nerve/depression problem for long time. Her sx has been stable. She occasionally has some difficulties falling and maintaining sleep. She denies any suicidal ideation. She has been compliant with taking medication without side effect. She has supportive family. Patient reports over the past several days she has noticed herself becoming more \"shaky\" and unbalanced. She reports more episodes of palpitations last week, doesn't see any relation to dizziness. More easily out of breath. BP has been fluctuating. She feels more fatigued, has little energy to even complete household chores. Occasionally has brief episodes of chest pain. She didn't take anything for the pain. She reported goes away on its own maximum within few minutes. Patient's medications, allergies, past medical, surgical, social and family histories were reviewed and updated as appropriate in the electronic medical record/chart.         Outpatient Medications Marked as Taking for the 5/9/19 encounter (Office Visit) with Kenzie Basilio MD   Medication Sig Dispense Refill    metoprolol succinate (TOPROL XL) 25 MG extended release tablet take 1 tablet by mouth once daily 90 tablet 3    cyanocobalamin 1000 MCG/ML injection Inject 1 mL into the muscle every 30 days 1 mL 10    hydrALAZINE (APRESOLINE) 25 MG tablet Take 1 tablet by mouth daily 90 tablet 2    spironolactone disturbance. The patient is nervous/anxious. Depressed       Past Medical History:   Diagnosis Date    Anxiety     Depression     Fatigue     GERD (gastroesophageal reflux disease)     Goiter     with Atypia on FNA    Hiatal hernia     Hyperlipidemia     Hypertension     Insomnia     Osteopenia     Rheumatic fever     AT AGE 12     Past Surgical History:   Procedure Laterality Date    BREAST SURGERY      EYE SURGERY      removed cloudiness off artificial lense    HYSTERECTOMY      THYROIDECTOMY  2012    dr Sondra Sainz        Family History   Problem Relation Age of Onset    High Blood Pressure Mother     Stroke Maternal Grandmother     Stroke Maternal Grandfather     Kidney Disease Maternal Grandfather     Heart Disease Father         CHF      Social History     Tobacco Use   Smoking Status Former Smoker    Packs/day: 2.00    Years: 31.00    Pack years: 62.00    Types: Cigarettes    Last attempt to quit: 1977    Years since quittin.3   Smokeless Tobacco Never Used       OBJECTIVE:   Wt Readings from Last 3 Encounters:   19 120 lb 3.2 oz (54.5 kg)   19 120 lb (54.4 kg)   19 120 lb 6.4 oz (54.6 kg)     BP Readings from Last 3 Encounters:   19 124/64   19 132/70   19 (!) 172/74       /64 (Site: Left Upper Arm, Position: Sitting, Cuff Size: Medium Adult)   Pulse 81   Resp 18   Wt 120 lb 3.2 oz (54.5 kg)   SpO2 95%   BMI 21.98 kg/m²      Physical Exam   Constitutional: She is oriented to person, place, and time. She appears well-developed and well-nourished. HENT:   Head: Normocephalic and atraumatic. Right Ear: External ear normal.   Left Ear: External ear normal.   Nose: Nose normal.   Eyes: Pupils are equal, round, and reactive to light. Conjunctivae and EOM are normal.   Neck: Neck supple. No JVD present. No thyromegaly present.    Cardiovascular: Normal rate, regular rhythm and normal heart sounds. Frequent extra systolic beat   Pulmonary/Chest: Effort normal and breath sounds normal. She has no wheezes. She has no rales. Abdominal: Soft. Bowel sounds are normal. She exhibits no distension. There is no tenderness. Musculoskeletal: She exhibits no edema or tenderness. Lumbar back: She exhibits decreased range of motion. She exhibits no tenderness and no spasm. Kyphosis/scoliosis. Mild difficulty standing up. Slow gait. Using cane   Neurological: She is alert and oriented to person, place, and time. Skin: No rash noted. No erythema. Psychiatric: She has a normal mood and affect. Her behavior is normal. Judgment and thought content normal.   Nursing note and vitals reviewed. Lab Results   Component Value Date     02/21/2019    K 4.5 02/21/2019    CL 97 02/21/2019    CO2 30 02/21/2019    GLUCOSE 102 02/21/2019    BUN 14 02/21/2019    CREATININE 0.7 02/21/2019    CALCIUM 9.5 02/21/2019    PROT 6.9 08/20/2018    LABALBU 4.3 08/20/2018    BILITOT 0.5 08/20/2018    ALT 9 08/20/2018    AST 20 08/20/2018       LDL Calculated (mg/dL)   Date Value   05/18/2017 205 (H)         Lab Results   Component Value Date    WBC 7.5 08/20/2018    NEUTROABS 5.1 08/20/2018    HGB 14.0 08/20/2018    HCT 44.2 08/20/2018    MCV 95.9 08/20/2018     08/20/2018       Lab Results   Component Value Date    TSH 3.44 08/20/2018       ASSESSMENT/PLAN:   Labs today. 1. Essential hypertension  BP is stable. I have advised her on low-sodium diet, exercise and weight control. I am going to continue current medication. Will monitor her renal function every few months, have advised her to check blood pressure frequently and to keep a record of this. Switch hydralazine to half a tablet twice a day from once in the morning. She is only using clonidine on as needed basis 1-2/month. 2. B12 deficiency  Continue on replacement.  Increase the frequency due to feeling much better shortly after replacement. 3. Pulmonary HTN (HCC)  Chronic, patient not interested in doing much on that part. Will have her follow up with Cardiology to address other issues/symptoms. 4. Fatigue, unspecified type  Labs today. Cardiology evaluation. May be symptomatic from her frequent PACs which change from occasionally to more frequent to bigeminy on EKGs during this visit. Discuss other workup/intervention regarding her chronic back pain which affected her mobility but she refused. 5. Palpitation  Frequent PACs noted. Will refer to Cardiology for further evaluation. Check labs today. Reviewed old cardiology records. Orders Placed This Encounter   Medications    cyanocobalamin 1000 MCG/ML injection     Sig: Inject 1 mL into the muscle every 7 days     Dispense:  12 mL     Refill:  1      Written by Carmenza Sanchez CMA, acting as a scribe for Dr. Elenita Kimball on 5/9/2019 at 3:35 PM.     I, Dr. Sil Golden, personally performed the services described in the documentation as scribed by Carmenza Sanchez CMA, in my presence and it is both accurate and complete.

## 2019-05-09 NOTE — PROGRESS NOTES
Have you seen any other physician or provider since your last visit yes - dentist     Have you had any other diagnostic tests since your last visit? no    Have you changed or stopped any medications since your last visit? no         Pt here co blood pressure being low states she has been feeling weak and shaky. Pt states she went off caffeine.

## 2019-05-10 LAB
A/G RATIO: 1.4 (ref 0.8–2)
ALBUMIN SERPL-MCNC: 3.8 G/DL (ref 3.4–4.8)
ALP BLD-CCNC: 98 U/L (ref 25–100)
ALT SERPL-CCNC: 8 U/L (ref 4–36)
ANION GAP SERPL CALCULATED.3IONS-SCNC: 10 MMOL/L (ref 3–16)
AST SERPL-CCNC: 18 U/L (ref 8–33)
BASOPHILS ABSOLUTE: 0.1 K/UL (ref 0–0.1)
BASOPHILS RELATIVE PERCENT: 0.8 %
BILIRUB SERPL-MCNC: 0.3 MG/DL (ref 0.3–1.2)
BUN BLDV-MCNC: 16 MG/DL (ref 6–20)
CALCIUM SERPL-MCNC: 9.6 MG/DL (ref 8.5–10.5)
CHLORIDE BLD-SCNC: 98 MMOL/L (ref 98–107)
CO2: 27 MMOL/L (ref 20–30)
CREAT SERPL-MCNC: 0.7 MG/DL (ref 0.4–1.2)
EOSINOPHILS ABSOLUTE: 0.3 K/UL (ref 0–0.4)
EOSINOPHILS RELATIVE PERCENT: 3.6 %
GFR AFRICAN AMERICAN: >59
GFR NON-AFRICAN AMERICAN: >60
GLOBULIN: 2.7 G/DL
GLUCOSE BLD-MCNC: 104 MG/DL (ref 74–106)
HCT VFR BLD CALC: 40.4 % (ref 37–47)
HEMOGLOBIN: 13.1 G/DL (ref 11.5–16.5)
IMMATURE GRANULOCYTES #: 0 K/UL
IMMATURE GRANULOCYTES %: 0.5 % (ref 0–5)
LYMPHOCYTES ABSOLUTE: 1.4 K/UL (ref 1.5–4)
LYMPHOCYTES RELATIVE PERCENT: 18.7 %
MCH RBC QN AUTO: 31 PG (ref 27–32)
MCHC RBC AUTO-ENTMCNC: 32.4 G/DL (ref 31–35)
MCV RBC AUTO: 95.7 FL (ref 80–100)
MONOCYTES ABSOLUTE: 0.8 K/UL (ref 0.2–0.8)
MONOCYTES RELATIVE PERCENT: 10.1 %
NEUTROPHILS ABSOLUTE: 5 K/UL (ref 2–7.5)
NEUTROPHILS RELATIVE PERCENT: 66.3 %
PDW BLD-RTO: 12.7 % (ref 11–16)
PLATELET # BLD: 325 K/UL (ref 150–400)
PMV BLD AUTO: 10.7 FL (ref 6–10)
POTASSIUM SERPL-SCNC: 4.4 MMOL/L (ref 3.4–5.1)
RBC # BLD: 4.22 M/UL (ref 3.8–5.8)
SODIUM BLD-SCNC: 135 MMOL/L (ref 136–145)
TOTAL PROTEIN: 6.5 G/DL (ref 6.4–8.3)
TSH SERPL DL<=0.05 MIU/L-ACNC: 4.65 UIU/ML (ref 0.35–5.5)
WBC # BLD: 7.6 K/UL (ref 4–11)

## 2019-05-28 ENCOUNTER — TELEPHONE (OUTPATIENT)
Dept: PRIMARY CARE CLINIC | Age: 84
End: 2019-05-28

## 2019-05-28 RX ORDER — FAMOTIDINE 20 MG/1
20 TABLET, FILM COATED ORAL 2 TIMES DAILY
Qty: 180 TABLET | Refills: 3 | Status: SHIPPED | OUTPATIENT
Start: 2019-05-28 | End: 2019-06-04 | Stop reason: SDUPTHER

## 2019-05-28 NOTE — TELEPHONE ENCOUNTER
Pt has an appt with Dr Laura Santiago on 7/16/19 in [place]. Looking at the last office note, pt may need to be seen sooner- please advise.  Thanks

## 2019-06-04 RX ORDER — FAMOTIDINE 20 MG/1
20 TABLET, FILM COATED ORAL 2 TIMES DAILY
Qty: 180 TABLET | Refills: 3 | Status: SHIPPED | OUTPATIENT
Start: 2019-06-04 | End: 2019-06-05 | Stop reason: SDUPTHER

## 2019-06-05 RX ORDER — FAMOTIDINE 20 MG/1
20 TABLET, FILM COATED ORAL 2 TIMES DAILY
Qty: 180 TABLET | Refills: 3 | Status: SHIPPED | OUTPATIENT
Start: 2019-06-05 | End: 2020-02-11 | Stop reason: SDUPTHER

## 2019-06-13 ENCOUNTER — OFFICE VISIT (OUTPATIENT)
Dept: CARDIOLOGY | Facility: CLINIC | Age: 84
End: 2019-06-13

## 2019-06-13 VITALS
HEART RATE: 76 BPM | HEIGHT: 62 IN | SYSTOLIC BLOOD PRESSURE: 128 MMHG | OXYGEN SATURATION: 96 % | BODY MASS INDEX: 22.08 KG/M2 | WEIGHT: 120 LBS | DIASTOLIC BLOOD PRESSURE: 60 MMHG

## 2019-06-13 DIAGNOSIS — E78.2 MIXED HYPERLIPIDEMIA: Primary | ICD-10-CM

## 2019-06-13 DIAGNOSIS — I10 ESSENTIAL HYPERTENSION: ICD-10-CM

## 2019-06-13 PROCEDURE — 99213 OFFICE O/P EST LOW 20 MIN: CPT | Performed by: INTERNAL MEDICINE

## 2019-06-13 NOTE — PROGRESS NOTES
[facility] at [facility]  Office visit  Gill Guerrero  10/14/1930    There is no work phone number on file.    VISIT DATE:  6/13/2019    PCP: Glory Amado MD  [address] [place] [id number]    CC:  Chief Complaint   Patient presents with   • Precordial pain   • Shortness of Breath       PROBLEM LIST:    ASSESSMENT:   Diagnosis Plan   1. Mixed hyperlipidemia     2. Essential hypertension         PLAN:  Hypertension, labile: Recommending continuing to allow permissive hypertension, reasonable goal blood pressure less than 150/90 mmHg, agree with as needed clonidine.  Discontinuing Toprol.     Hyperlipidemia: Continue dietary modifications. I do not think pharmacologic therapy at her advanced age and the absence of secondary prevention would be beneficial and would likely only induce side effects and worsen polypharmacy.     Pulmonary hypertension: Relatively benign cardiopulmonary exam today. No evidence of right-sided heart failure and not requiring supplemental oxygen. Not recommending further imaging evaluation at this time.    Palpitations: Clinically consistent with either PACs or short bursts of nonsustained atrial tachycardia.  Currently rare.  No high risk medical features.  We will arrange for ambulatory ECG monitor if symptoms increase in frequency or severity.    Subjective  Gill is a very delightful 87-year-old female with a history of atypical chest pain, premature atrial contractions, labile hypertension and previously diagnosed pulmonary hypertension.  Most of her functional limitations are due to age-related musculoskeletal issues.  She has intermittent rare brief palpitations which she describes as a fluttering sensation 1-2 times per month without associated symptoms.  Blood pressures remain erratic, systolic blood pressures typically run less than 150/90 mmHg, rarely systolics are ranging 160 to 170 mmHg range and rarely in the 85 to 90 mmHg range.  Denies presyncope or  "syncope.  Feels generalized fatigue when her systolic blood pressures are less than 110 mmHg.  Currently on a combination of both carvedilol and metoprolol succinate.      PHYSICAL EXAMINATION:  Vitals:    06/13/19 1021   BP: 128/60   BP Location: Left arm   Patient Position: Sitting   Pulse: 76   SpO2: 96%   Weight: 54.4 kg (120 lb)   Height: 157.5 cm (62\")     General Appearance:    Alert, cooperative, no distress, appears stated age   Head:    Normocephalic, without obvious abnormality, atraumatic   Eyes:    conjunctiva/corneas clear   Nose:   Nares normal, septum midline, mucosa normal, no drainage   Throat:   Lips, teeth and gums normal   Neck:   Supple, symmetrical, trachea midline, no carotid    bruit or JVD   Lungs:     Clear to auscultation bilaterally, respirations unlabored   Chest Wall:    No tenderness or deformity    Heart:    Regular rate and rhythm, S1 and S2 normal, no murmur, rub   or gallop, normal carotid impulse bilaterally without bruit.   Abdomen:     Soft, non-tender   Extremities:   Extremities normal, atraumatic, no cyanosis or edema   Pulses:   2+ and symmetric all extremities   Skin:   Skin color, texture, turgor normal, no rashes or lesions       Diagnostic Data:  Procedures  No results found for: CHLPL, TRIG, HDL, LDLDIRECT  No results found for: GLUCOSE, BUN, CREATININE, NA, K, CL, CO2, CREATININE, BUN  No results found for: HGBA1C  Lab Results   Component Value Date    WBC 7.6 05/09/2019    HGB 13.1 05/09/2019    HCT 40.4 05/09/2019     05/09/2019       Allergies  Allergies   Allergen Reactions   • Cephalexin    • Chocolate    • Erythromycin    • Iodine    • Nuts    • Penicillins    • Shellfish-Derived Products    • Tramadol Itching       Current Medications    Current Outpatient Medications:   •  ALPRAZolam (XANAX) 0.25 MG tablet, Take 0.25 mg by mouth As Needed., Disp: , Rfl:   •  carvedilol (COREG) 25 MG tablet, take 1 tablet by mouth twice a day, Disp: , Rfl:   •  CloNIDine " (CATAPRES) 0.2 MG tablet, take 1 tablet by mouth twice a day, Disp: , Rfl:   •  esomeprazole (nexIUM) 40 MG capsule, Take 40 mg by mouth Daily., Disp: , Rfl:   •  estradiol (ESTRACE) 0.1 MG/GM vaginal cream, Insert 2 g into the vagina Daily., Disp: , Rfl:   •  famotidine (PEPCID) 20 MG tablet, Take 20 mg by mouth 2 (Two) Times a Day As Needed., Disp: , Rfl:   •  hydrALAZINE (APRESOLINE) 25 MG tablet, Take 25 mg by mouth 2 (Two) Times a Day., Disp: , Rfl:   •  losartan (COZAAR) 100 MG tablet, Take 100 mg by mouth Daily., Disp: , Rfl:   •  metoprolol succinate XL (TOPROL-XL) 25 MG 24 hr tablet, take 1 tablet by mouth once daily, Disp: , Rfl:   •  metroNIDAZOLE (METROGEL) 1 % gel, Bid As directed for 4 weeks, Disp: , Rfl:   •  Multiple Vitamins-Minerals (MULTIVITAMIN ADULT PO), Take 1 tablet by mouth Daily., Disp: , Rfl:   •  NIFEdipine XL (PROCARDIA XL) 30 MG 24 hr tablet, Take 30 mg by mouth Daily., Disp: , Rfl:   •  sertraline (ZOLOFT) 100 MG tablet, Take two pills daily., Disp: , Rfl:           ROS  Review of Systems   Cardiovascular: Positive for dyspnea on exertion, irregular heartbeat and palpitations.   Respiratory: Positive for shortness of breath and snoring.          SOCIAL HX  Social History     Socioeconomic History   • Marital status:      Spouse name: Not on file   • Number of children: Not on file   • Years of education: Not on file   • Highest education level: Not on file   Tobacco Use   • Smoking status: Former Smoker     Packs/day: 1.00     Types: Cigarettes     Last attempt to quit: 1982     Years since quittin.1   • Smokeless tobacco: Never Used   Substance and Sexual Activity   • Alcohol use: No   • Drug use: No   • Sexual activity: Defer     Birth control/protection: Surgical       FAMILY HX  Family History   Problem Relation Age of Onset   • Coronary artery disease Father    • Hyperlipidemia Father    • COPD Father    • Hyperlipidemia Paternal Grandfather    • Hyperlipidemia  Paternal Grandmother    • Hyperlipidemia Maternal Grandmother    • Hyperlipidemia Maternal Grandfather    • Hypertension Mother              Raleigh Ordaz III, MD, FACC

## 2019-06-14 RX ORDER — ESOMEPRAZOLE MAGNESIUM 40 MG/1
40 CAPSULE, DELAYED RELEASE ORAL DAILY
Qty: 90 CAPSULE | Refills: 3 | Status: SHIPPED | OUTPATIENT
Start: 2019-06-14 | End: 2021-04-13 | Stop reason: ALTCHOICE

## 2019-08-26 ENCOUNTER — HOSPITAL ENCOUNTER (OUTPATIENT)
Facility: HOSPITAL | Age: 84
Discharge: HOME | End: 2019-08-26
Payer: MEDICARE

## 2019-08-26 ENCOUNTER — OFFICE VISIT (OUTPATIENT)
Dept: PRIMARY CARE CLINIC | Age: 84
End: 2019-08-26
Payer: MEDICARE

## 2019-08-26 VITALS
WEIGHT: 119.8 LBS | SYSTOLIC BLOOD PRESSURE: 136 MMHG | RESPIRATION RATE: 18 BRPM | HEART RATE: 87 BPM | BODY MASS INDEX: 21.91 KG/M2 | DIASTOLIC BLOOD PRESSURE: 70 MMHG | OXYGEN SATURATION: 94 %

## 2019-08-26 DIAGNOSIS — I10 ESSENTIAL HYPERTENSION: Primary | ICD-10-CM

## 2019-08-26 DIAGNOSIS — R63.4 WEIGHT LOSS: ICD-10-CM

## 2019-08-26 DIAGNOSIS — I10 ESSENTIAL HYPERTENSION: ICD-10-CM

## 2019-08-26 DIAGNOSIS — R06.09 DYSPNEA ON EXERTION: ICD-10-CM

## 2019-08-26 DIAGNOSIS — R91.1 LUNG NODULE: ICD-10-CM

## 2019-08-26 PROCEDURE — 80053 COMPREHEN METABOLIC PANEL: CPT

## 2019-08-26 PROCEDURE — G8420 CALC BMI NORM PARAMETERS: HCPCS | Performed by: INTERNAL MEDICINE

## 2019-08-26 PROCEDURE — 1036F TOBACCO NON-USER: CPT | Performed by: INTERNAL MEDICINE

## 2019-08-26 PROCEDURE — 99213 OFFICE O/P EST LOW 20 MIN: CPT | Performed by: INTERNAL MEDICINE

## 2019-08-26 PROCEDURE — 1090F PRES/ABSN URINE INCON ASSESS: CPT | Performed by: INTERNAL MEDICINE

## 2019-08-26 PROCEDURE — 85025 COMPLETE CBC W/AUTO DIFF WBC: CPT

## 2019-08-26 PROCEDURE — 1123F ACP DISCUSS/DSCN MKR DOCD: CPT | Performed by: INTERNAL MEDICINE

## 2019-08-26 PROCEDURE — G8427 DOCREV CUR MEDS BY ELIG CLIN: HCPCS | Performed by: INTERNAL MEDICINE

## 2019-08-26 PROCEDURE — 4040F PNEUMOC VAC/ADMIN/RCVD: CPT | Performed by: INTERNAL MEDICINE

## 2019-08-26 RX ORDER — ESTRADIOL 0.1 MG/G
1 CREAM VAGINAL DAILY
Qty: 3 TUBE | Refills: 3 | Status: SHIPPED | OUTPATIENT
Start: 2019-08-26

## 2019-08-26 ASSESSMENT — ENCOUNTER SYMPTOMS
COUGH: 0
SINUS PRESSURE: 0
EYE DISCHARGE: 0
NAUSEA: 0
VOMITING: 0
SHORTNESS OF BREATH: 0
ABDOMINAL PAIN: 0
SORE THROAT: 0
WHEEZING: 0
BACK PAIN: 1

## 2019-08-26 NOTE — PROGRESS NOTES
30 days. . 60 tablet 0    cloNIDine (CATAPRES) 0.1 MG tablet Take 1 tablet by mouth 2 times daily 180 tablet 1    estradiol (ESTRACE VAGINAL) 0.1 MG/GM vaginal cream Place 1 g vaginally daily 3 Tube 3    losartan (COZAAR) 100 MG tablet take 1 tablet by mouth once daily 90 tablet 2    carvedilol (COREG) 25 MG tablet take 1 tablet by mouth twice a day 180 tablet 3    NIFEdipine (PROCARDIA XL) 30 MG extended release tablet take 1 tablet by mouth once daily 90 tablet 3    metroNIDAZOLE (METROGEL) 1 % gel Bid As directed for 4 weeks 1 Tube 1    multivitamin (THERAGRAN) per tablet Take 1 tablet by mouth daily. Review of Systems   Constitutional: Positive for fatigue. Negative for chills and fever. HENT: Negative for congestion, sinus pressure and sore throat. Eyes: Negative for discharge and visual disturbance. Respiratory: Negative for cough, shortness of breath and wheezing. Cardiovascular: Positive for chest pain (hx), palpitations and leg swelling. SANABRIA   Gastrointestinal: Negative for abdominal pain, nausea and vomiting. Endocrine: Negative for cold intolerance and heat intolerance. Genitourinary: Negative for dysuria, frequency and urgency. Musculoskeletal: Positive for arthralgias, back pain and gait problem. Skin: Negative for rash and wound. Neurological: Positive for dizziness and weakness. Negative for syncope, numbness and headaches. Hematological: Negative. Psychiatric/Behavioral: Negative for agitation, self-injury, sleep disturbance and suicidal ideas. The patient is nervous/anxious.         Past Medical History:   Diagnosis Date    Anxiety     Depression     Fatigue     GERD (gastroesophageal reflux disease)     Goiter     with Atypia on FNA    Hiatal hernia     Hyperlipidemia     Hypertension     Insomnia     Osteopenia     Rheumatic fever     AT AGE 12     Past Surgical History:   Procedure Laterality Date    BREAST SURGERY      EYE SURGERY removed cloudiness off artificial lense    HYSTERECTOMY      THYROIDECTOMY  2012    dr Sapna Turcios ARTHROPLASTY        Family History   Problem Relation Age of Onset    High Blood Pressure Mother     Stroke Maternal Grandmother     Stroke Maternal Grandfather     Kidney Disease Maternal Grandfather     Heart Disease Father         CHF      Social History     Tobacco Use   Smoking Status Former Smoker    Packs/day: 2.00    Years: 31.00    Pack years: 62.00    Types: Cigarettes    Last attempt to quit: 1977    Years since quittin.6   Smokeless Tobacco Never Used       OBJECTIVE:   Wt Readings from Last 3 Encounters:   19 119 lb 12.8 oz (54.3 kg)   19 120 lb 3.2 oz (54.5 kg)   19 120 lb (54.4 kg)     BP Readings from Last 3 Encounters:   19 136/70   19 124/64   19 132/70       /70 (Site: Left Upper Arm, Position: Sitting, Cuff Size: Medium Adult)   Pulse 87   Resp 18   Wt 119 lb 12.8 oz (54.3 kg)   SpO2 94%   BMI 21.91 kg/m²      Physical Exam   Constitutional: She is oriented to person, place, and time. She appears well-developed and well-nourished. HENT:   Head: Normocephalic and atraumatic. Right Ear: External ear normal.   Left Ear: External ear normal.   Nose: Nose normal.   Eyes: Pupils are equal, round, and reactive to light. Conjunctivae and EOM are normal.   Neck: Neck supple. No JVD present. No thyromegaly present. Cardiovascular: Normal rate, regular rhythm and normal heart sounds. Occasional extra systolic beats   Pulmonary/Chest: Effort normal and breath sounds normal. She has no wheezes. She has no rales. Abdominal: Soft. Bowel sounds are normal. She exhibits no distension. There is no tenderness. Musculoskeletal: She exhibits no edema or tenderness. Lumbar back: She exhibits decreased range of motion. She exhibits no tenderness and no spasm.

## 2019-08-27 LAB
A/G RATIO: 1.5 (ref 0.8–2)
ALBUMIN SERPL-MCNC: 4.1 G/DL (ref 3.4–4.8)
ALP BLD-CCNC: 109 U/L (ref 25–100)
ALT SERPL-CCNC: 9 U/L (ref 4–36)
ANION GAP SERPL CALCULATED.3IONS-SCNC: 10 MMOL/L (ref 3–16)
AST SERPL-CCNC: 19 U/L (ref 8–33)
BASOPHILS ABSOLUTE: 0.1 K/UL (ref 0–0.1)
BASOPHILS RELATIVE PERCENT: 0.6 %
BILIRUB SERPL-MCNC: 0.3 MG/DL (ref 0.3–1.2)
BUN BLDV-MCNC: 16 MG/DL (ref 6–20)
CALCIUM SERPL-MCNC: 10 MG/DL (ref 8.5–10.5)
CHLORIDE BLD-SCNC: 92 MMOL/L (ref 98–107)
CO2: 30 MMOL/L (ref 20–30)
CREAT SERPL-MCNC: 0.6 MG/DL (ref 0.4–1.2)
EOSINOPHILS ABSOLUTE: 0.3 K/UL (ref 0–0.4)
EOSINOPHILS RELATIVE PERCENT: 2.8 %
GFR AFRICAN AMERICAN: >59
GFR NON-AFRICAN AMERICAN: >60
GLOBULIN: 2.8 G/DL
GLUCOSE BLD-MCNC: 107 MG/DL (ref 74–106)
HCT VFR BLD CALC: 42.4 % (ref 37–47)
HEMOGLOBIN: 13.8 G/DL (ref 11.5–16.5)
IMMATURE GRANULOCYTES #: 0 K/UL
IMMATURE GRANULOCYTES %: 0.2 % (ref 0–5)
LYMPHOCYTES ABSOLUTE: 1.4 K/UL (ref 1.5–4)
LYMPHOCYTES RELATIVE PERCENT: 16.3 %
MCH RBC QN AUTO: 30.9 PG (ref 27–32)
MCHC RBC AUTO-ENTMCNC: 32.5 G/DL (ref 31–35)
MCV RBC AUTO: 94.9 FL (ref 80–100)
MONOCYTES ABSOLUTE: 0.8 K/UL (ref 0.2–0.8)
MONOCYTES RELATIVE PERCENT: 9 %
NEUTROPHILS ABSOLUTE: 6.2 K/UL (ref 2–7.5)
NEUTROPHILS RELATIVE PERCENT: 71.1 %
PDW BLD-RTO: 12.4 % (ref 11–16)
PLATELET # BLD: 292 K/UL (ref 150–400)
PMV BLD AUTO: 11.5 FL (ref 6–10)
POTASSIUM SERPL-SCNC: 4.7 MMOL/L (ref 3.4–5.1)
RBC # BLD: 4.47 M/UL (ref 3.8–5.8)
SODIUM BLD-SCNC: 132 MMOL/L (ref 136–145)
TOTAL PROTEIN: 6.9 G/DL (ref 6.4–8.3)
WBC # BLD: 8.8 K/UL (ref 4–11)

## 2019-08-30 RX ORDER — LOSARTAN POTASSIUM 100 MG/1
TABLET ORAL
Qty: 90 TABLET | Refills: 2 | Status: SHIPPED | OUTPATIENT
Start: 2019-08-30 | End: 2020-03-30 | Stop reason: SDUPTHER

## 2019-09-11 ENCOUNTER — NURSE ONLY (OUTPATIENT)
Dept: PRIMARY CARE CLINIC | Age: 84
End: 2019-09-11
Payer: MEDICARE

## 2019-09-11 DIAGNOSIS — E53.8 B12 DEFICIENCY: Primary | ICD-10-CM

## 2019-09-11 PROCEDURE — 96372 THER/PROPH/DIAG INJ SC/IM: CPT | Performed by: INTERNAL MEDICINE

## 2019-09-11 RX ORDER — CYANOCOBALAMIN 1000 UG/ML
1000 INJECTION INTRAMUSCULAR; SUBCUTANEOUS ONCE
Status: COMPLETED | OUTPATIENT
Start: 2019-09-11 | End: 2019-09-11

## 2019-09-11 RX ADMIN — CYANOCOBALAMIN 1000 MCG: 1000 INJECTION INTRAMUSCULAR; SUBCUTANEOUS at 13:25

## 2019-09-11 NOTE — PROGRESS NOTES
Administrations This Visit     cyanocobalamin injection 1,000 mcg     Admin Date  09/11/2019  13:25 Action  Given Dose  1000 mcg Route  Intramuscular Site  Deltoid Left Administered By  Jayashree Carrasco    Ordering Provider:  Abigail Mahmood MD    [facility]:  [id number]    Lot#:  [id number]    :  [id number] [facility]    Patient Supplied?:  No

## 2019-10-08 RX ORDER — METRONIDAZOLE 10 MG/G
GEL TOPICAL
Qty: 1 TUBE | Refills: 1 | Status: ON HOLD | OUTPATIENT
Start: 2019-10-08 | End: 2021-06-28

## 2019-10-16 RX ORDER — NIFEDIPINE 30 MG/1
TABLET, FILM COATED, EXTENDED RELEASE ORAL
Qty: 90 TABLET | Refills: 3 | Status: SHIPPED | OUTPATIENT
Start: 2019-10-16 | End: 2020-02-11 | Stop reason: SDUPTHER

## 2019-10-24 RX ORDER — SPIRONOLACTONE 25 MG/1
25 TABLET ORAL DAILY
Qty: 90 TABLET | Refills: 2 | Status: SHIPPED | OUTPATIENT
Start: 2019-10-24 | End: 2020-03-30 | Stop reason: SDUPTHER

## 2019-12-10 RX ORDER — CARVEDILOL 25 MG/1
TABLET ORAL
Qty: 180 TABLET | Refills: 3 | Status: SHIPPED | OUTPATIENT
Start: 2019-12-10 | End: 2020-02-19 | Stop reason: SDUPTHER

## 2020-01-08 ENCOUNTER — TELEPHONE (OUTPATIENT)
Dept: PRIMARY CARE CLINIC | Age: 85
End: 2020-01-08

## 2020-01-24 RX ORDER — METOPROLOL SUCCINATE 25 MG/1
TABLET, EXTENDED RELEASE ORAL
Qty: 90 TABLET | Refills: 2 | Status: SHIPPED | OUTPATIENT
Start: 2020-01-24 | End: 2020-12-02 | Stop reason: SDUPTHER

## 2020-02-11 RX ORDER — NIFEDIPINE 30 MG/1
TABLET, FILM COATED, EXTENDED RELEASE ORAL
Qty: 90 TABLET | Refills: 0 | Status: SHIPPED | OUTPATIENT
Start: 2020-02-11 | End: 2020-05-13

## 2020-02-11 RX ORDER — FAMOTIDINE 20 MG/1
20 TABLET, FILM COATED ORAL 2 TIMES DAILY
Qty: 180 TABLET | Refills: 0 | Status: SHIPPED | OUTPATIENT
Start: 2020-02-11 | End: 2020-08-24

## 2020-02-19 ENCOUNTER — OFFICE VISIT (OUTPATIENT)
Dept: PRIMARY CARE CLINIC | Age: 85
End: 2020-02-19
Payer: MEDICARE

## 2020-02-19 ENCOUNTER — HOSPITAL ENCOUNTER (OUTPATIENT)
Facility: HOSPITAL | Age: 85
Discharge: HOME | End: 2020-02-19
Payer: MEDICARE

## 2020-02-19 VITALS
SYSTOLIC BLOOD PRESSURE: 138 MMHG | BODY MASS INDEX: 21.77 KG/M2 | OXYGEN SATURATION: 96 % | DIASTOLIC BLOOD PRESSURE: 70 MMHG | WEIGHT: 119 LBS | RESPIRATION RATE: 18 BRPM | HEART RATE: 75 BPM

## 2020-02-19 PROCEDURE — 83735 ASSAY OF MAGNESIUM: CPT

## 2020-02-19 PROCEDURE — G8427 DOCREV CUR MEDS BY ELIG CLIN: HCPCS | Performed by: INTERNAL MEDICINE

## 2020-02-19 PROCEDURE — G8482 FLU IMMUNIZE ORDER/ADMIN: HCPCS | Performed by: INTERNAL MEDICINE

## 2020-02-19 PROCEDURE — 80061 LIPID PANEL: CPT

## 2020-02-19 PROCEDURE — 1090F PRES/ABSN URINE INCON ASSESS: CPT | Performed by: INTERNAL MEDICINE

## 2020-02-19 PROCEDURE — 85025 COMPLETE CBC W/AUTO DIFF WBC: CPT

## 2020-02-19 PROCEDURE — 1036F TOBACCO NON-USER: CPT | Performed by: INTERNAL MEDICINE

## 2020-02-19 PROCEDURE — 4040F PNEUMOC VAC/ADMIN/RCVD: CPT | Performed by: INTERNAL MEDICINE

## 2020-02-19 PROCEDURE — 1123F ACP DISCUSS/DSCN MKR DOCD: CPT | Performed by: INTERNAL MEDICINE

## 2020-02-19 PROCEDURE — 80053 COMPREHEN METABOLIC PANEL: CPT

## 2020-02-19 PROCEDURE — G8420 CALC BMI NORM PARAMETERS: HCPCS | Performed by: INTERNAL MEDICINE

## 2020-02-19 PROCEDURE — 99213 OFFICE O/P EST LOW 20 MIN: CPT | Performed by: INTERNAL MEDICINE

## 2020-02-19 RX ORDER — SERTRALINE HYDROCHLORIDE 100 MG/1
200 TABLET, FILM COATED ORAL DAILY
Qty: 180 TABLET | Refills: 3 | Status: ON HOLD | OUTPATIENT
Start: 2020-02-19 | End: 2021-07-07 | Stop reason: SDUPTHER

## 2020-02-19 RX ORDER — CARVEDILOL 25 MG/1
TABLET ORAL
Qty: 180 TABLET | Refills: 3 | Status: SHIPPED | OUTPATIENT
Start: 2020-02-19 | End: 2021-06-17 | Stop reason: SDUPTHER

## 2020-02-19 ASSESSMENT — ENCOUNTER SYMPTOMS
VOMITING: 0
EYE DISCHARGE: 0
SINUS PRESSURE: 0
COUGH: 0
NAUSEA: 0
ABDOMINAL PAIN: 0
SORE THROAT: 0
BACK PAIN: 1
WHEEZING: 0
SHORTNESS OF BREATH: 0

## 2020-02-19 ASSESSMENT — PATIENT HEALTH QUESTIONNAIRE - PHQ9
2. FEELING DOWN, DEPRESSED OR HOPELESS: 1
SUM OF ALL RESPONSES TO PHQ QUESTIONS 1-9: 2
1. LITTLE INTEREST OR PLEASURE IN DOING THINGS: 1
SUM OF ALL RESPONSES TO PHQ QUESTIONS 1-9: 2
SUM OF ALL RESPONSES TO PHQ9 QUESTIONS 1 & 2: 2

## 2020-02-19 NOTE — PROGRESS NOTES
Chief Complaint   Patient presents with    Hypertension    Discuss Labs       Have you seen any other physician or provider since your last visit no    Have you had any other diagnostic tests since your last visit? no    Have you changed or stopped any medications since your last visit? yes - stopped hydralazine,  Only taking metoprolol 1 a day. I have recommended that this patient have a immunization for shingles, tdap , pnuemo but she declines at this time. I have discussed the risks and benefits of this examination with her. The patient verbalizes understanding.

## 2020-02-19 NOTE — PROGRESS NOTES
SUBJECTIVE:    Patient ID: Haja Mckeon is a 80 y. o.female. Chief Complaint   Patient presents with    Hypertension    Discuss Labs     HPI:  Patient has had hypertension for several years. She has been compliant with taking medications, without side effects from it. She has been following a low-sodium, is  active and never exercises. Weight is stable, compared to last visit. Her blood pressure is at the upper normal limits at this time. Patient reported that her blood pressure is much better at home compared to before. Patient does have known lung nodules. She does not want to have any further workup on this. She also has SANABRIA that is at baseline. She states she does not get out much. No cough or hemoptysis. Weight has been stable compared to before. Patient's medications, allergies, past medical, surgical, social and family histories were reviewed and updated as appropriate in the electronic medical record/chart.         Outpatient Medications Marked as Taking for the 2/19/20 encounter (Office Visit) with Sela Cowden, MD   Medication Sig Dispense Refill    NIFEdipine (ADALAT CC) 30 MG extended release tablet take 1 tablet by mouth once daily 90 tablet 0    famotidine (PEPCID) 20 MG tablet Take 1 tablet by mouth 2 times daily 180 tablet 0    metoprolol succinate (TOPROL XL) 25 MG extended release tablet take 1 tablet twice a day (Patient taking differently: Take 25 mg by mouth daily take 1 tablet twice a day) 90 tablet 2    carvedilol (COREG) 25 MG tablet take 1 tablet by mouth twice a day 180 tablet 3    spironolactone (ALDACTONE) 25 MG tablet Take 1 tablet by mouth daily 90 tablet 2    metroNIDAZOLE (METROGEL) 1 % gel Bid As directed for 4 weeks 1 Tube 1    losartan (COZAAR) 100 MG tablet take 1 tablet by mouth once daily 90 tablet 2    estradiol (ESTRACE VAGINAL) 0.1 MG/GM vaginal cream Place 1 g vaginally daily 3 Tube 3    esomeprazole (NEXIUM) 40 MG delayed release capsule Take 1 capsule by mouth daily 90 capsule 3    cyanocobalamin 1000 MCG/ML injection Inject 1 mL into the muscle every 7 days 12 mL 1    sertraline (ZOLOFT) 100 MG tablet Take 2 tablets by mouth daily 180 tablet 3    ALPRAZolam (XANAX) 0.25 MG tablet Take 1 tablet by mouth 2 times daily as needed for Sleep or Anxiety for up to 30 days. . 60 tablet 0    multivitamin (THERAGRAN) per tablet Take 1 tablet by mouth daily. Review of Systems   Constitutional: Positive for fatigue. Negative for appetite change, chills, fever and unexpected weight change. HENT: Negative for congestion, sinus pressure and sore throat. Eyes: Negative for discharge and visual disturbance. Respiratory: Negative for cough, shortness of breath and wheezing. Cardiovascular: Positive for chest pain (hx) and leg swelling. Negative for palpitations. SANABRIA   Gastrointestinal: Negative for abdominal pain, nausea and vomiting. Endocrine: Negative for cold intolerance and heat intolerance. Genitourinary: Negative for dysuria, frequency and urgency. Musculoskeletal: Positive for arthralgias, back pain and gait problem. Skin: Negative for rash and wound. Neurological: Positive for weakness. Negative for dizziness, syncope, numbness and headaches. Hematological: Negative. Psychiatric/Behavioral: Negative for agitation, self-injury, sleep disturbance and suicidal ideas. The patient is nervous/anxious.         Past Medical History:   Diagnosis Date    Anxiety     Depression     Fatigue     GERD (gastroesophageal reflux disease)     Goiter     with Atypia on FNA    Hiatal hernia     Hyperlipidemia     Hypertension     Insomnia     Osteopenia     Rheumatic fever     AT AGE 12     Past Surgical History:   Procedure Laterality Date    BREAST SURGERY      EYE SURGERY      removed cloudiness off artificial lense    HYSTERECTOMY      THYROIDECTOMY  03/22/2012    dr Chantal Golden leg: Edema (trace ) present. Comments: Kyphosis/scoliosis. Using cane. Mild difficulty standing up. Skin:     Findings: No erythema or rash. Neurological:      General: No focal deficit present. Mental Status: She is alert and oriented to person, place, and time. Psychiatric:         Behavior: Behavior normal.         Judgment: Judgment normal.         Lab Results   Component Value Date     08/26/2019    K 4.7 08/26/2019    CL 92 08/26/2019    CO2 30 08/26/2019    GLUCOSE 107 08/26/2019    BUN 16 08/26/2019    CREATININE 0.6 08/26/2019    CALCIUM 10.0 08/26/2019    PROT 6.9 08/26/2019    LABALBU 4.1 08/26/2019    BILITOT 0.3 08/26/2019    ALT 9 08/26/2019    AST 19 08/26/2019       LDL Calculated (mg/dL)   Date Value   05/18/2017 205 (H)         Lab Results   Component Value Date    WBC 8.8 08/26/2019    NEUTROABS 6.2 08/26/2019    HGB 13.8 08/26/2019    HCT 42.4 08/26/2019    MCV 94.9 08/26/2019     08/26/2019       Lab Results   Component Value Date    TSH 4.65 05/09/2019       ASSESSMENT/PLAN:     1. Essential hypertension  BP is stable. I have advised her on low-sodium diet, exercise and weight control. I am going to continue current medication. Will monitor her renal function every few months, have advised her to check blood pressure frequently and to keep a record of this. 2. Lung nodule  Patient is not interested in further imaging or monitoring of this. 3. Dyspnea on exertion  Baseline. Continue to monitor. Advised patient to stay active. Advised her to discuss that with her cardiologist on her follow-up. 4. Weight loss  Her weight has been stable over the past few visits. Advised patient to try and increase dietary intake. Continue to monitor. Patient is not interested in proceeding with any aggressive diagnostic testing.     Orders Placed This Encounter   Medications    carvedilol (COREG) 25 MG tablet     Sig: take 1 tablet by mouth twice a day     Dispense:

## 2020-02-20 LAB
A/G RATIO: 1.6 (ref 0.8–2)
ALBUMIN SERPL-MCNC: 4.3 G/DL (ref 3.4–4.8)
ALP BLD-CCNC: 114 U/L (ref 25–100)
ALT SERPL-CCNC: 8 U/L (ref 4–36)
ANION GAP SERPL CALCULATED.3IONS-SCNC: 9 MMOL/L (ref 3–16)
AST SERPL-CCNC: 19 U/L (ref 8–33)
BASOPHILS ABSOLUTE: 0.1 K/UL (ref 0–0.1)
BASOPHILS RELATIVE PERCENT: 0.6 %
BILIRUB SERPL-MCNC: 0.3 MG/DL (ref 0.3–1.2)
BUN BLDV-MCNC: 15 MG/DL (ref 6–20)
CALCIUM SERPL-MCNC: 9.9 MG/DL (ref 8.5–10.5)
CHLORIDE BLD-SCNC: 98 MMOL/L (ref 98–107)
CHOLESTEROL, TOTAL: 270 MG/DL (ref 0–200)
CO2: 32 MMOL/L (ref 20–30)
CREAT SERPL-MCNC: 0.8 MG/DL (ref 0.4–1.2)
EOSINOPHILS ABSOLUTE: 0.3 K/UL (ref 0–0.4)
EOSINOPHILS RELATIVE PERCENT: 3.8 %
GFR AFRICAN AMERICAN: >59
GFR NON-AFRICAN AMERICAN: >60
GLOBULIN: 2.7 G/DL
GLUCOSE BLD-MCNC: 100 MG/DL (ref 74–106)
HCT VFR BLD CALC: 44.4 % (ref 37–47)
HDLC SERPL-MCNC: 68 MG/DL (ref 40–60)
HEMOGLOBIN: 14 G/DL (ref 11.5–16.5)
IMMATURE GRANULOCYTES #: 0 K/UL
IMMATURE GRANULOCYTES %: 0.1 % (ref 0–5)
LDL CHOLESTEROL CALCULATED: 172 MG/DL
LYMPHOCYTES ABSOLUTE: 1.6 K/UL (ref 1.5–4)
LYMPHOCYTES RELATIVE PERCENT: 21.1 %
MAGNESIUM: 1.9 MG/DL (ref 1.7–2.4)
MCH RBC QN AUTO: 30.2 PG (ref 27–32)
MCHC RBC AUTO-ENTMCNC: 31.5 G/DL (ref 31–35)
MCV RBC AUTO: 95.9 FL (ref 80–100)
MONOCYTES ABSOLUTE: 0.7 K/UL (ref 0.2–0.8)
MONOCYTES RELATIVE PERCENT: 8.5 %
NEUTROPHILS ABSOLUTE: 5.1 K/UL (ref 2–7.5)
NEUTROPHILS RELATIVE PERCENT: 65.9 %
PDW BLD-RTO: 12.5 % (ref 11–16)
PLATELET # BLD: 271 K/UL (ref 150–400)
PMV BLD AUTO: 11.3 FL (ref 6–10)
POTASSIUM SERPL-SCNC: 5.3 MMOL/L (ref 3.4–5.1)
RBC # BLD: 4.63 M/UL (ref 3.8–5.8)
SODIUM BLD-SCNC: 139 MMOL/L (ref 136–145)
TOTAL PROTEIN: 7 G/DL (ref 6.4–8.3)
TRIGL SERPL-MCNC: 149 MG/DL (ref 0–249)
VLDLC SERPL CALC-MCNC: 30 MG/DL
WBC # BLD: 7.7 K/UL (ref 4–11)

## 2020-03-30 RX ORDER — SPIRONOLACTONE 25 MG/1
25 TABLET ORAL DAILY
Qty: 90 TABLET | Refills: 1 | Status: SHIPPED | OUTPATIENT
Start: 2020-03-30 | End: 2020-07-14

## 2020-03-30 RX ORDER — LOSARTAN POTASSIUM 100 MG/1
TABLET ORAL
Qty: 90 TABLET | Refills: 1 | Status: SHIPPED | OUTPATIENT
Start: 2020-03-30 | End: 2020-10-07

## 2020-04-22 ENCOUNTER — TELEPHONE (OUTPATIENT)
Dept: PRIMARY CARE CLINIC | Age: 85
End: 2020-04-22

## 2020-04-22 RX ORDER — CYANOCOBALAMIN 1000 UG/ML
1000 INJECTION INTRAMUSCULAR; SUBCUTANEOUS
Qty: 12 ML | Refills: 1 | Status: SHIPPED | OUTPATIENT
Start: 2020-04-22 | End: 2020-04-22 | Stop reason: SDUPTHER

## 2020-04-22 RX ORDER — CYANOCOBALAMIN 1000 UG/ML
1000 INJECTION INTRAMUSCULAR; SUBCUTANEOUS
Qty: 12 ML | Refills: 1 | Status: SHIPPED | OUTPATIENT
Start: 2020-04-22 | End: 2020-05-07 | Stop reason: SDUPTHER

## 2020-05-07 RX ORDER — CYANOCOBALAMIN 1000 UG/ML
1000 INJECTION INTRAMUSCULAR; SUBCUTANEOUS
Qty: 12 ML | Refills: 1 | Status: SHIPPED | OUTPATIENT
Start: 2020-05-07 | End: 2020-11-24 | Stop reason: SDUPTHER

## 2020-05-12 ENCOUNTER — TELEPHONE (OUTPATIENT)
Dept: PRIMARY CARE CLINIC | Age: 85
End: 2020-05-12

## 2020-05-12 NOTE — TELEPHONE ENCOUNTER
Thee Grossman called to inform office that they decline option for Virtual Visit. Patient declined MyChart. Please call patient with any changes/questions/concerns.

## 2020-05-13 RX ORDER — NIFEDIPINE 30 MG/1
TABLET, FILM COATED, EXTENDED RELEASE ORAL
Qty: 90 TABLET | Refills: 0 | Status: SHIPPED | OUTPATIENT
Start: 2020-05-13 | End: 2020-08-17

## 2020-06-17 ENCOUNTER — OFFICE VISIT (OUTPATIENT)
Dept: PRIMARY CARE CLINIC | Age: 85
End: 2020-06-17
Payer: MEDICARE

## 2020-06-17 ENCOUNTER — HOSPITAL ENCOUNTER (OUTPATIENT)
Facility: HOSPITAL | Age: 85
Discharge: HOME | End: 2020-06-17
Payer: MEDICARE

## 2020-06-17 VITALS
TEMPERATURE: 97.9 F | OXYGEN SATURATION: 94 % | RESPIRATION RATE: 18 BRPM | HEART RATE: 89 BPM | DIASTOLIC BLOOD PRESSURE: 68 MMHG | BODY MASS INDEX: 21.22 KG/M2 | WEIGHT: 116 LBS | SYSTOLIC BLOOD PRESSURE: 136 MMHG

## 2020-06-17 PROCEDURE — 1123F ACP DISCUSS/DSCN MKR DOCD: CPT | Performed by: INTERNAL MEDICINE

## 2020-06-17 PROCEDURE — 85025 COMPLETE CBC W/AUTO DIFF WBC: CPT

## 2020-06-17 PROCEDURE — 1036F TOBACCO NON-USER: CPT | Performed by: INTERNAL MEDICINE

## 2020-06-17 PROCEDURE — 4040F PNEUMOC VAC/ADMIN/RCVD: CPT | Performed by: INTERNAL MEDICINE

## 2020-06-17 PROCEDURE — 80053 COMPREHEN METABOLIC PANEL: CPT

## 2020-06-17 PROCEDURE — 99214 OFFICE O/P EST MOD 30 MIN: CPT | Performed by: INTERNAL MEDICINE

## 2020-06-17 PROCEDURE — G8420 CALC BMI NORM PARAMETERS: HCPCS | Performed by: INTERNAL MEDICINE

## 2020-06-17 PROCEDURE — G8427 DOCREV CUR MEDS BY ELIG CLIN: HCPCS | Performed by: INTERNAL MEDICINE

## 2020-06-17 PROCEDURE — 84443 ASSAY THYROID STIM HORMONE: CPT

## 2020-06-17 PROCEDURE — 1090F PRES/ABSN URINE INCON ASSESS: CPT | Performed by: INTERNAL MEDICINE

## 2020-06-17 RX ORDER — VENLAFAXINE HYDROCHLORIDE 37.5 MG/1
37.5 CAPSULE, EXTENDED RELEASE ORAL 2 TIMES DAILY
Qty: 60 CAPSULE | Refills: 3 | Status: SHIPPED | OUTPATIENT
Start: 2020-06-17 | End: 2021-04-13

## 2020-06-17 RX ORDER — VENLAFAXINE HYDROCHLORIDE 37.5 MG/1
37.5 CAPSULE, EXTENDED RELEASE ORAL DAILY
Qty: 30 CAPSULE | Refills: 3 | Status: SHIPPED | OUTPATIENT
Start: 2020-06-17 | End: 2020-06-17 | Stop reason: SDUPTHER

## 2020-06-17 ASSESSMENT — ENCOUNTER SYMPTOMS
SORE THROAT: 0
VOMITING: 0
NAUSEA: 0
BACK PAIN: 1
ABDOMINAL PAIN: 0
SINUS PRESSURE: 0
WHEEZING: 0
SHORTNESS OF BREATH: 0
COUGH: 0
EYE DISCHARGE: 0

## 2020-06-17 NOTE — PROGRESS NOTES
SUBJECTIVE:    Patient ID: Olga Lidia Arnold is a 80 y. o.female. Chief Complaint   Patient presents with    Hypertension    Depression    Shortness of Breath     HPI:  Patient has had hypertension for several years. She has been compliant with taking medications, without side effects from it. She has been following a low-sodium, is lightly active and never exercises. Weight is down 3 pounds, compared to last visit. Her blood pressure is stable at this time. Patient does have known lung nodules. She does not want to have any further workup on this. She also has [place] slightly worse than baseline. She has not been leaving her home often. No cough or hemoptysis. Patient with history of depression. She states that this has gotten worse in the past few weeks. Her daughter is here and agrees that she sees this as a worsening problem. She is currently taking Zoloft with SE. patient reported sleeping well. Patient reported having long history of this. She reported the current virus outbreak made things worse. She denies any suicidal ideation. She has supportive family. Patient's medications, allergies, past medical, surgical, social and family histories were reviewed and updated as appropriate in the electronic medical record/chart.         Outpatient Medications Marked as Taking for the 6/17/20 encounter (Office Visit) with Corbin Umanzor MD   Medication Sig Dispense Refill    NIFEdipine (ADALAT CC) 30 MG extended release tablet TAKE 1 TABLET BY MOUTH EVERY DAY 90 tablet 0    cyanocobalamin 1000 MCG/ML injection Inject 1 mL into the muscle every 7 days 12 mL 1    spironolactone (ALDACTONE) 25 MG tablet Take 1 tablet by mouth daily 90 tablet 1    losartan (COZAAR) 100 MG tablet take 1 tablet by mouth once daily 90 tablet 1    carvedilol (COREG) 25 MG tablet take 1 tablet by mouth twice a day 180 tablet 3    sertraline (ZOLOFT) 100 MG tablet Take 2 tablets by mouth daily 180 tablet 3    famotidine (PEPCID) 20 MG tablet Take 1 tablet by mouth 2 times daily 180 tablet 0    metoprolol succinate (TOPROL XL) 25 MG extended release tablet take 1 tablet twice a day (Patient taking differently: Take 25 mg by mouth daily take 1 tablet twice a day) 90 tablet 2    metroNIDAZOLE (METROGEL) 1 % gel Bid As directed for 4 weeks 1 Tube 1    estradiol (ESTRACE VAGINAL) 0.1 MG/GM vaginal cream Place 1 g vaginally daily 3 Tube 3    esomeprazole (NEXIUM) 40 MG delayed release capsule Take 1 capsule by mouth daily 90 capsule 3    ALPRAZolam (XANAX) 0.25 MG tablet Take 1 tablet by mouth 2 times daily as needed for Sleep or Anxiety for up to 30 days. . 60 tablet 0    multivitamin (THERAGRAN) per tablet Take 1 tablet by mouth daily. Review of Systems   Constitutional: Positive for fatigue. Negative for appetite change, chills, fever and unexpected weight change. HENT: Negative for congestion, sinus pressure and sore throat. Eyes: Negative for discharge and visual disturbance. Respiratory: Negative for cough, shortness of breath and wheezing. Cardiovascular: Positive for leg swelling (occasional ). Negative for chest pain and palpitations. [place]   Gastrointestinal: Negative for abdominal pain, nausea and vomiting. Endocrine: Negative for cold intolerance and heat intolerance. Genitourinary: Negative for dysuria, frequency and urgency. Musculoskeletal: Positive for arthralgias, back pain and gait problem. Skin: Negative for rash and wound. Neurological: Positive for weakness. Negative for dizziness, tremors, syncope, numbness and headaches. Hematological: Negative. Psychiatric/Behavioral: Negative for agitation, self-injury, sleep disturbance and suicidal ideas. The patient is nervous/anxious.          As in HPI       Past Medical History:   Diagnosis Date    Anxiety     Depression     Fatigue     GERD (gastroesophageal reflux disease)     Goiter     with Atypia on FNA    Hiatal hernia  Hyperlipidemia     Hypertension     Insomnia     Osteopenia     Rheumatic fever     AT AGE 12     Past Surgical History:   Procedure Laterality Date    BREAST SURGERY      EYE SURGERY      removed cloudiness off artificial lense    HYSTERECTOMY      THYROIDECTOMY  2012    dr Karlie Morejon TOTAL HIP ARTHROPLASTY        Family History   Problem Relation Age of Onset    High Blood Pressure Mother     Stroke Maternal Grandmother     Stroke Maternal Grandfather     Kidney Disease Maternal Grandfather     Heart Disease Father         CHF      Social History     Tobacco Use   Smoking Status Former Smoker    Packs/day: 2.00    Years: 31.00    Pack years: 62.00    Types: Cigarettes    Last attempt to quit: 1977    Years since quittin.4   Smokeless Tobacco Never Used       OBJECTIVE:   Wt Readings from Last 3 Encounters:   20 116 lb (52.6 kg)   20 119 lb (54 kg)   19 119 lb 12.8 oz (54.3 kg)     BP Readings from Last 3 Encounters:   20 136/68   20 138/70   19 136/70       /68 (Site: Right Upper Arm, Position: Sitting, Cuff Size: Medium Adult)   Pulse 89   Temp 97.9 °F (36.6 °C) (Oral)   Resp 18   Wt 116 lb (52.6 kg)   SpO2 94%   BMI 21.22 kg/m²      Physical Exam  Vitals signs and nursing note reviewed. Constitutional:       Appearance: Normal appearance. She is well-developed. HENT:      Head: Normocephalic and atraumatic. Right Ear: External ear normal.      Left Ear: External ear normal.      Nose: Nose normal.   Eyes:      Conjunctiva/sclera: Conjunctivae normal.      Pupils: Pupils are equal, round, and reactive to light. Neck:      Musculoskeletal: Neck supple. No neck rigidity or muscular tenderness. Thyroid: No thyromegaly. Vascular: No JVD. Cardiovascular:      Rate and Rhythm: Normal rate and regular rhythm. Heart sounds: Normal heart sounds.    Pulmonary: Effort: Pulmonary effort is normal.      Breath sounds: Normal breath sounds. No wheezing or rales. Abdominal:      General: Bowel sounds are normal. There is no distension. Palpations: Abdomen is soft. Tenderness: There is no abdominal tenderness. Musculoskeletal:         General: No tenderness. Right lower leg: Edema (trace ) present. Left lower leg: Edema (trace ) present. Comments: Kyphosis/scoliosis. Using cane. Mild difficulty standing up. Skin:     Findings: No erythema or rash. Neurological:      General: No focal deficit present. Mental Status: She is alert and oriented to person, place, and time. Psychiatric:         Behavior: Behavior normal.         Judgment: Judgment normal.         Lab Results   Component Value Date     02/19/2020    K 5.3 02/19/2020    CL 98 02/19/2020    CO2 32 02/19/2020    GLUCOSE 100 02/19/2020    BUN 15 02/19/2020    CREATININE 0.8 02/19/2020    CALCIUM 9.9 02/19/2020    PROT 7.0 02/19/2020    LABALBU 4.3 02/19/2020    BILITOT 0.3 02/19/2020    ALT 8 02/19/2020    AST 19 02/19/2020       LDL Calculated (mg/dL)   Date Value   02/19/2020 172 (H)         Lab Results   Component Value Date    WBC 7.7 02/19/2020    NEUTROABS 5.1 02/19/2020    HGB 14.0 02/19/2020    HCT 44.4 02/19/2020    MCV 95.9 02/19/2020     02/19/2020       Lab Results   Component Value Date    TSH 4.65 05/09/2019       ASSESSMENT/PLAN:     1. Essential hypertension  BP is stable. I have advised her on low-sodium diet, exercise and weight control. I am going to continue current medication. Will monitor her renal function every few months, have advised her to check blood pressure frequently and to keep a record of this. - CBC Auto Differential; Future  - Comprehensive Metabolic Panel; Future  - TSH without Reflex; Future    2. Lung nodule  Patient is not interested in further imaging or monitoring of this.      3. Depression, unspecified depression type  I am going to continue current medication and add Effexor. I advised the patient to seek counseling. I will reassess current condition every few months. Patient to call or RTC if experienced any deterioration of Sx.      4. Fatigue, unspecified type  Previous work-up has been unremarkable. This is likely a combination between aging, multiple other medical problems including pulmonary hypertension, arthralgia/back issues and now complicated by depression. Try to improve medical problems. Treat her depression. Encourage increase activity level. Monitor labs. - CBC Auto Differential; Future  - Comprehensive Metabolic Panel; Future  - TSH without Reflex; Future      Orders Placed This Encounter   Medications    venlafaxine (EFFEXOR XR) 37.5 MG extended release capsule     Sig: Take 1 capsule by mouth daily     Dispense:  30 capsule     Refill:  3      Written by Onelia Wynne, acting as a scribe for Dr. Merian Leventhal on 6/17/2020 at 2:44 PM.     I, Dr. Teresa Baxter, personally performed the services described in the documentation as scribed by Asia Cary CMA, in my presence and it is both accurate and complete.

## 2020-06-17 NOTE — PROGRESS NOTES
Chief Complaint   Patient presents with    Hypertension    Depression       Have you seen any other physician or provider since your last visit no    Have you had any other diagnostic tests since your last visit? no    Have you changed or stopped any medications since your last visit? YES has cut down on metoprolol states she may take it 2 or 3 days a week.

## 2020-06-18 LAB
A/G RATIO: 1.5 (ref 0.8–2)
ALBUMIN SERPL-MCNC: 3.9 G/DL (ref 3.4–4.8)
ALP BLD-CCNC: 97 U/L (ref 25–100)
ALT SERPL-CCNC: 6 U/L (ref 4–36)
ANION GAP SERPL CALCULATED.3IONS-SCNC: 8 MMOL/L (ref 3–16)
AST SERPL-CCNC: 17 U/L (ref 8–33)
BASOPHILS ABSOLUTE: 0.1 K/UL (ref 0–0.1)
BASOPHILS RELATIVE PERCENT: 0.6 %
BILIRUB SERPL-MCNC: <0.2 MG/DL (ref 0.3–1.2)
BUN BLDV-MCNC: 23 MG/DL (ref 6–20)
CALCIUM SERPL-MCNC: 10 MG/DL (ref 8.5–10.5)
CHLORIDE BLD-SCNC: 96 MMOL/L (ref 98–107)
CO2: 29 MMOL/L (ref 20–30)
CREAT SERPL-MCNC: 1 MG/DL (ref 0.4–1.2)
EOSINOPHILS ABSOLUTE: 0.3 K/UL (ref 0–0.4)
EOSINOPHILS RELATIVE PERCENT: 4.1 %
GFR AFRICAN AMERICAN: >59
GFR NON-AFRICAN AMERICAN: 52
GLOBULIN: 2.6 G/DL
GLUCOSE BLD-MCNC: 118 MG/DL (ref 74–106)
HCT VFR BLD CALC: 43.2 % (ref 37–47)
HEMOGLOBIN: 13.7 G/DL (ref 11.5–16.5)
IMMATURE GRANULOCYTES #: 0 K/UL
IMMATURE GRANULOCYTES %: 0.2 % (ref 0–5)
LYMPHOCYTES ABSOLUTE: 1.3 K/UL (ref 1.5–4)
LYMPHOCYTES RELATIVE PERCENT: 16.6 %
MCH RBC QN AUTO: 30.4 PG (ref 27–32)
MCHC RBC AUTO-ENTMCNC: 31.7 G/DL (ref 31–35)
MCV RBC AUTO: 96 FL (ref 80–100)
MONOCYTES ABSOLUTE: 0.7 K/UL (ref 0.2–0.8)
MONOCYTES RELATIVE PERCENT: 8.8 %
NEUTROPHILS ABSOLUTE: 5.6 K/UL (ref 2–7.5)
NEUTROPHILS RELATIVE PERCENT: 69.7 %
PDW BLD-RTO: 12.3 % (ref 11–16)
PLATELET # BLD: 259 K/UL (ref 150–400)
PMV BLD AUTO: 11.4 FL (ref 6–10)
POTASSIUM SERPL-SCNC: 4.7 MMOL/L (ref 3.4–5.1)
RBC # BLD: 4.5 M/UL (ref 3.8–5.8)
SODIUM BLD-SCNC: 133 MMOL/L (ref 136–145)
TOTAL PROTEIN: 6.5 G/DL (ref 6.4–8.3)
TSH SERPL DL<=0.05 MIU/L-ACNC: 4.05 UIU/ML (ref 0.35–5.5)
WBC # BLD: 8.1 K/UL (ref 4–11)

## 2020-07-10 RX ORDER — METRONIDAZOLE 500 MG/1
500 TABLET ORAL 3 TIMES DAILY
Qty: 30 TABLET | Refills: 0 | Status: SHIPPED | OUTPATIENT
Start: 2020-07-10 | End: 2020-07-20

## 2020-07-14 RX ORDER — SPIRONOLACTONE 25 MG/1
25 TABLET ORAL DAILY
Qty: 90 TABLET | Refills: 1 | Status: SHIPPED | OUTPATIENT
Start: 2020-07-14 | End: 2020-12-02 | Stop reason: SDUPTHER

## 2020-07-29 ENCOUNTER — VIRTUAL VISIT (OUTPATIENT)
Dept: PRIMARY CARE CLINIC | Age: 85
End: 2020-07-29
Payer: MEDICARE

## 2020-07-29 PROCEDURE — G2025 DIS SITE TELE SVCS RHC/FQHC: HCPCS | Performed by: INTERNAL MEDICINE

## 2020-07-29 ASSESSMENT — ENCOUNTER SYMPTOMS
VOMITING: 0
WHEEZING: 0
EYE DISCHARGE: 0
SINUS PRESSURE: 0
NAUSEA: 0
SORE THROAT: 0
ABDOMINAL PAIN: 0
BACK PAIN: 1
SHORTNESS OF BREATH: 0
COUGH: 0

## 2020-07-29 NOTE — PROGRESS NOTES
for 2 weeks then titrate to 2 (Patient taking differently: Take 37.5 mg by mouth 2 times daily Taking every other day.) 60 capsule 3    NIFEdipine (ADALAT CC) 30 MG extended release tablet TAKE 1 TABLET BY MOUTH EVERY DAY 90 tablet 0    cyanocobalamin 1000 MCG/ML injection Inject 1 mL into the muscle every 7 days 12 mL 1    losartan (COZAAR) 100 MG tablet take 1 tablet by mouth once daily 90 tablet 1    carvedilol (COREG) 25 MG tablet take 1 tablet by mouth twice a day 180 tablet 3    sertraline (ZOLOFT) 100 MG tablet Take 2 tablets by mouth daily 180 tablet 3    famotidine (PEPCID) 20 MG tablet Take 1 tablet by mouth 2 times daily 180 tablet 0    metoprolol succinate (TOPROL XL) 25 MG extended release tablet take 1 tablet twice a day (Patient taking differently: Take 25 mg by mouth daily take 1 tablet twice a day) 90 tablet 2    metroNIDAZOLE (METROGEL) 1 % gel Bid As directed for 4 weeks 1 Tube 1    estradiol (ESTRACE VAGINAL) 0.1 MG/GM vaginal cream Place 1 g vaginally daily 3 Tube 3    esomeprazole (NEXIUM) 40 MG delayed release capsule Take 1 capsule by mouth daily 90 capsule 3    ALPRAZolam (XANAX) 0.25 MG tablet Take 1 tablet by mouth 2 times daily as needed for Sleep or Anxiety for up to 30 days. . 60 tablet 0    multivitamin (THERAGRAN) per tablet Take 1 tablet by mouth daily. Review of Systems   Constitutional: Positive for fatigue. Negative for appetite change, chills, fever and unexpected weight change. HENT: Negative for congestion, sinus pressure and sore throat. Eyes: Negative for discharge and visual disturbance. Respiratory: Negative for cough, shortness of breath and wheezing. Cardiovascular: Positive for leg swelling (occasional ). Negative for chest pain and palpitations. [place]   Gastrointestinal: Negative for abdominal pain, nausea and vomiting. Endocrine: Negative for cold intolerance and heat intolerance.    Genitourinary: Negative for dysuria, frequency and

## 2020-08-17 RX ORDER — NIFEDIPINE 30 MG/1
TABLET, FILM COATED, EXTENDED RELEASE ORAL
Qty: 90 TABLET | Refills: 0 | Status: SHIPPED | OUTPATIENT
Start: 2020-08-17 | End: 2021-04-13

## 2020-08-24 RX ORDER — FAMOTIDINE 20 MG/1
TABLET, FILM COATED ORAL
Qty: 180 TABLET | Refills: 0 | Status: SHIPPED | OUTPATIENT
Start: 2020-08-24 | End: 2020-12-10 | Stop reason: SDUPTHER

## 2020-10-07 RX ORDER — LOSARTAN POTASSIUM 100 MG/1
TABLET ORAL
Qty: 90 TABLET | Refills: 1 | Status: SHIPPED | OUTPATIENT
Start: 2020-10-07 | End: 2021-02-17 | Stop reason: SDUPTHER

## 2020-10-14 ENCOUNTER — NURSE ONLY (OUTPATIENT)
Dept: PRIMARY CARE CLINIC | Age: 85
End: 2020-10-14

## 2020-10-14 ENCOUNTER — TELEPHONE (OUTPATIENT)
Dept: PRIMARY CARE CLINIC | Age: 85
End: 2020-10-14

## 2020-10-14 PROCEDURE — 90682 RIV4 VACC RECOMBINANT DNA IM: CPT | Performed by: INTERNAL MEDICINE

## 2020-10-14 PROCEDURE — G0008 ADMIN INFLUENZA VIRUS VAC: HCPCS | Performed by: INTERNAL MEDICINE

## 2020-10-14 NOTE — PROGRESS NOTES
Vaccine Information Sheet, \"Influenza - Inactivated\"  given to Yung Primes, or parent/legal guardian of  Yung Primes and verbalized understanding. Patient responses:    Have you ever had a reaction to a flu vaccine? No  Do you have any current illness? No  Have you ever had Guillian [place] Syndrome? No  Do you have a serious allergy to any of the follow: Neomycin, Polymyxin, Thimerosal, eggs or egg products? No    Flu vaccine given per order. Please see immunization tab. Risks and benefits explained. Current VIS given.

## 2020-11-24 RX ORDER — CYANOCOBALAMIN 1000 UG/ML
1000 INJECTION INTRAMUSCULAR; SUBCUTANEOUS
Qty: 12 ML | Refills: 1 | Status: SHIPPED | OUTPATIENT
Start: 2020-11-24

## 2020-12-02 ENCOUNTER — TELEPHONE (OUTPATIENT)
Dept: PRIMARY CARE CLINIC | Age: 85
End: 2020-12-02

## 2020-12-02 RX ORDER — METOPROLOL SUCCINATE 25 MG/1
25 TABLET, EXTENDED RELEASE ORAL 2 TIMES DAILY
Qty: 180 TABLET | Refills: 1 | Status: ON HOLD | OUTPATIENT
Start: 2020-12-02 | End: 2021-07-07 | Stop reason: HOSPADM

## 2020-12-02 RX ORDER — METOPROLOL SUCCINATE 25 MG/1
25 TABLET, EXTENDED RELEASE ORAL DAILY
Qty: 180 TABLET | Refills: 1 | Status: SHIPPED | OUTPATIENT
Start: 2020-12-02 | End: 2020-12-02

## 2020-12-02 RX ORDER — SPIRONOLACTONE 25 MG/1
25 TABLET ORAL DAILY
Qty: 90 TABLET | Refills: 1 | Status: SHIPPED | OUTPATIENT
Start: 2020-12-02 | End: 2021-06-22

## 2020-12-10 RX ORDER — FAMOTIDINE 20 MG/1
TABLET, FILM COATED ORAL
Qty: 180 TABLET | Refills: 1 | Status: ON HOLD | OUTPATIENT
Start: 2020-12-10 | End: 2021-07-07 | Stop reason: HOSPADM

## 2020-12-10 RX ORDER — FAMOTIDINE 20 MG/1
TABLET, FILM COATED ORAL
Qty: 180 TABLET | Refills: 1 | Status: SHIPPED | OUTPATIENT
Start: 2020-12-10 | End: 2020-12-10 | Stop reason: SDUPTHER

## 2021-02-17 RX ORDER — LOSARTAN POTASSIUM 100 MG/1
TABLET ORAL
Qty: 90 TABLET | Refills: 1 | Status: ON HOLD | OUTPATIENT
Start: 2021-02-17 | End: 2021-07-07 | Stop reason: HOSPADM

## 2021-04-13 ENCOUNTER — OFFICE VISIT (OUTPATIENT)
Dept: PRIMARY CARE CLINIC | Age: 86
End: 2021-04-13
Payer: MEDICARE

## 2021-04-13 ENCOUNTER — HOSPITAL ENCOUNTER (OUTPATIENT)
Facility: HOSPITAL | Age: 86
Discharge: HOME | End: 2021-04-13
Payer: MEDICARE

## 2021-04-13 VITALS
TEMPERATURE: 98.2 F | DIASTOLIC BLOOD PRESSURE: 80 MMHG | OXYGEN SATURATION: 93 % | RESPIRATION RATE: 18 BRPM | SYSTOLIC BLOOD PRESSURE: 132 MMHG | HEART RATE: 89 BPM

## 2021-04-13 DIAGNOSIS — R53.83 FATIGUE, UNSPECIFIED TYPE: ICD-10-CM

## 2021-04-13 DIAGNOSIS — I10 ESSENTIAL HYPERTENSION: ICD-10-CM

## 2021-04-13 DIAGNOSIS — Z00.00 ROUTINE GENERAL MEDICAL EXAMINATION AT A HEALTH CARE FACILITY: ICD-10-CM

## 2021-04-13 DIAGNOSIS — Z00.00 ROUTINE GENERAL MEDICAL EXAMINATION AT A HEALTH CARE FACILITY: Primary | ICD-10-CM

## 2021-04-13 PROCEDURE — 84443 ASSAY THYROID STIM HORMONE: CPT

## 2021-04-13 PROCEDURE — 85025 COMPLETE CBC W/AUTO DIFF WBC: CPT

## 2021-04-13 PROCEDURE — G0438 PPPS, INITIAL VISIT: HCPCS | Performed by: INTERNAL MEDICINE

## 2021-04-13 PROCEDURE — 80053 COMPREHEN METABOLIC PANEL: CPT

## 2021-04-13 PROCEDURE — 82607 VITAMIN B-12: CPT

## 2021-04-13 PROCEDURE — 4040F PNEUMOC VAC/ADMIN/RCVD: CPT | Performed by: INTERNAL MEDICINE

## 2021-04-13 PROCEDURE — 1123F ACP DISCUSS/DSCN MKR DOCD: CPT | Performed by: INTERNAL MEDICINE

## 2021-04-13 PROCEDURE — 82746 ASSAY OF FOLIC ACID SERUM: CPT

## 2021-04-13 RX ORDER — MULTIVIT-MIN/IRON/FOLIC ACID/K 18-600-40
CAPSULE ORAL
COMMUNITY

## 2021-04-13 RX ORDER — DOCUSATE SODIUM 100 MG/1
CAPSULE, LIQUID FILLED ORAL
Status: ON HOLD | COMMUNITY
Start: 2021-01-18 | End: 2021-07-07 | Stop reason: HOSPADM

## 2021-04-13 ASSESSMENT — PATIENT HEALTH QUESTIONNAIRE - PHQ9
SUM OF ALL RESPONSES TO PHQ QUESTIONS 1-9: 2
SUM OF ALL RESPONSES TO PHQ QUESTIONS 1-9: 2
2. FEELING DOWN, DEPRESSED OR HOPELESS: 1

## 2021-04-13 ASSESSMENT — LIFESTYLE VARIABLES: HOW MANY STANDARD DRINKS CONTAINING ALCOHOL DO YOU HAVE ON A TYPICAL DAY: 0

## 2021-04-13 NOTE — PROGRESS NOTES
Chief Complaint   Patient presents with    Medicare AWV       Have you seen any other physician or provider since your last visit no    Have you had any other diagnostic tests since your last visit? no    Have you changed or stopped any medications since your last visit?  yes -she stopped   Nifedipine, effexor and nexium   Started dulcolax , vit d, culturelle

## 2021-04-13 NOTE — PROGRESS NOTES
Medicare Annual Wellness Visit  Name: Marian Baldwin Date: 2021   MRN: [id number] Sex: Female   Age: 80 y.o. Ethnicity: Non-/Non    : 10/14/1930 Race: Harry Powell is here for Medicare AWV    Screenings for behavioral, psychosocial and functional/safety risks, and cognitive dysfunction are all negative except as indicated below. These results, as well as other patient data from the [address] form, are documented in Flowsheets linked to this Encounter. Allergies   Allergen Reactions    Cephalexin     Chocolate     Erythromycin     Iodine     Peanut-Containing Drug Products     Penicillins     Shellfish-Derived Products     Tramadol Itching     Prior to Visit Medications    Medication Sig Taking?  Authorizing Provider    MG capsule TAKE 1 TO 2 CAPSULES BY MOUTH AT BEDTIME Yes Historical Provider, MD   Cholecalciferol (VITAMIN D) 50 MCG (2000) CAPS capsule Take by mouth Twice daily Yes Historical Provider, MD   Lactobacillus (CULTURELLE DIGESTIVE WOMENS PO) Take by mouth daily Yes Historical Provider, MD   losartan (COZAAR) 100 MG tablet TAKE 1 TABLET BY MOUTH EVERY DAY Yes Priscilla Sanderson MD   famotidine (PEPCID) 20 MG tablet TAKE 1 TABLET BY MOUTH TWICE DAILY Yes Priscilla Sanderson MD   spironolactone (ALDACTONE) 25 MG tablet Take 1 tablet by mouth daily Yes Priscilla Sanderson MD   metoprolol succinate (TOPROL XL) 25 MG extended release tablet Take 1 tablet by mouth 2 times daily take 1 tablet twice a day Yes Priscilla Sanderson MD   cyanocobalamin 1000 MCG/ML injection Inject 1 mL into the muscle every 7 days Yes Priscilla Sanderson MD   carvedilol (COREG) 25 MG tablet take 1 tablet by mouth twice a day Yes Priscilla Sanderson MD   sertraline (ZOLOFT) 100 MG tablet Take 2 tablets by mouth daily Yes Priscilla Sanderson MD   metroNIDAZOLE (METROGEL) 1 % gel Bid As directed for 4 weeks Yes Priscilla Sanderson MD   estradiol (ESTRACE VAGINAL) 0.1 MG/GM vaginal cream Place 1 g vaginally daily Yes Audelia Boss MD   ALPRAZolam Natan Fernanda) 0.25 MG tablet Take 1 tablet by mouth 2 times daily as needed for Sleep or Anxiety for up to 30 days. David Becerra MD   multivitamin [facility]) per tablet Take 1 tablet by mouth daily. Yes Historical Provider, MD     Past Medical History:   Diagnosis Date    Anxiety     Depression     Fatigue     GERD (gastroesophageal reflux disease)     Goiter     with Atypia on FNA    Hiatal hernia     Hyperlipidemia     Hypertension     Insomnia     Osteopenia     Rheumatic fever     AT AGE 12     Past Surgical History:   Procedure Laterality Date    BREAST SURGERY      EYE SURGERY      removed cloudiness off artificial lense    HYSTERECTOMY      THYROIDECTOMY  03/22/2012    dr Guero Isaacs       Family History   Problem Relation Age of Onset    High Blood Pressure Mother     Stroke Maternal Grandmother     Stroke Maternal Grandfather     Kidney Disease Maternal Grandfather     Heart Disease Father         CHF       CareTeam (Including outside providers/suppliers regularly involved in providing care):   Patient Care Team:  Audelia Boss MD as PCP - General  Audelia Boss MD as PCP - [facility] Empaneled Provider    Wt Readings from Last 3 Encounters:   06/17/20 116 lb (52.6 kg)   02/19/20 119 lb (54 kg)   08/26/19 119 lb 12.8 oz (54.3 kg)     Vitals:    04/13/21 1532   BP: 132/80   Pulse: 89   Resp: 18   Temp: 98.2 °F (36.8 °C)   SpO2: 93%     There is no height or weight on file to calculate BMI. Based upon direct observation of the patient, evaluation of cognition reveals recent and remote memory intact. Physical Exam  Vitals signs and nursing note reviewed. Constitutional:       Appearance: Normal appearance. She is well-developed. HENT:      Head: Normocephalic and atraumatic.       Right Ear: External ear normal.      Left Ear: External ear normal.      Nose: Nose normal.   Eyes: Conjunctiva/sclera: Conjunctivae normal.      Pupils: Pupils are equal, round, and reactive to light. Neck:      Musculoskeletal: Neck supple. No neck rigidity or muscular tenderness. Thyroid: No thyromegaly. Vascular: No JVD. Cardiovascular:      Rate and Rhythm: Normal rate and regular rhythm. Heart sounds: Normal heart sounds. Pulmonary:      Effort: Pulmonary effort is normal.      Breath sounds: Normal breath sounds. No wheezing or rales. Abdominal:      General: Bowel sounds are normal. There is no distension. Palpations: Abdomen is soft. Tenderness: There is no abdominal tenderness. Musculoskeletal:         General: No tenderness. Right lower leg: Edema (trace ) present. Left lower leg: Edema (trace ) present. Comments: Kyphosis/scoliosis. Using cane. Mild difficulty standing up. Skin:     Findings: No erythema or rash. Neurological:      General: No focal deficit present. Mental Status: She is alert and oriented to person, place, and time. Psychiatric:         Behavior: Behavior normal.         Judgment: Judgment normal.            Lab Results   Component Value Date     06/17/2020    K 4.7 06/17/2020    CL 96 06/17/2020    CO2 29 06/17/2020    GLUCOSE 118 06/17/2020    BUN 23 06/17/2020    CREATININE 1.0 06/17/2020    CALCIUM 10.0 06/17/2020    PROT 6.5 06/17/2020    LABALBU 3.9 06/17/2020    BILITOT <0.2 06/17/2020    ALT 6 06/17/2020    AST 17 06/17/2020       LDL Calculated (mg/dL)   Date Value   02/19/2020 172 (H)         Lab Results   Component Value Date    WBC 8.1 06/17/2020    NEUTROABS 5.6 06/17/2020    HGB 13.7 06/17/2020    HCT 43.2 06/17/2020    MCV 96.0 06/17/2020     06/17/2020       Lab Results   Component Value Date    TSH 4.05 06/17/2020       Patient's complete Health Risk Assessment and screening values have been reviewed and are found in Flowsheets.  The following problems were reviewed today and where indicated follow up appointments were made and/or referrals ordered. Positive Risk Factor Screenings with Interventions:     Fall Risk:  Timed Up and Go Test > 12 seconds? (Complete if either Fall Risk answers are Yes): (!) yes  2 or more falls in past year?: no  Fall with injury in past year?: no  Fall Risk Interventions:    · Home safety tips provided        General Health and ACP:  General  In general, how would you say your health is?: Fair  In the past 7 days, have you experienced any of the following? New or Increased Pain, New or Increased Fatigue, Loneliness, Social Isolation, Stress or Anger?: (!) Stress  Do you get the social and emotional support that you need?: Yes  Do you have a Living Will?: Yes  Advance Directives     Power of  Living Will ACP-Advance Directive ACP-Power of     Not on File Not on File Not on File Not on File      General Health Risk Interventions:  · Stress: She reports she is very anxious today. Health Habits/Nutrition:  Health Habits/Nutrition  Do you exercise for at least 20 minutes 2-3 times per week?: (!) No  Have you lost any weight without trying in the past 3 months?: No  Do you eat only one meal per day?: No  Have you seen the dentist within the past year?: Yes     Health Habits/Nutrition Interventions:  · Advised her to stay active as tolerated and safely. Hearing/Vision:  No exam data present  Hearing/Vision  Do you or your family notice any trouble with your hearing that hasn't been managed with hearing aids?: No  Do you have difficulty driving, watching TV, or doing any of your daily activities because of your eyesight?: No  Have you had an eye exam within the past year?: (!) No  Hearing/Vision Interventions:  · Vision concerns:  patient encouraged to make appointment with his/her eye specialist     ADL:  ADLs  In the past 7 days, did you need help from others to perform any of the following everyday activities?  Eating, dressing, grooming, bathing, toileting, or walking/balance?: (!) Walking/Balance  In the past 7 days, did you need help from others to take care of any of the following? Laundry, housekeeping, banking/finances, shopping, telephone use, food preparation, transportation, or taking medications?: (!) Housekeeping, Transportation  ADL Interventions:  · Patient declines any further evaluation/treatment for this issue    Personalized Preventive Plan   Current Health Maintenance Status  Immunization History   Administered Date(s) Administered    COVID-19, Reggie Lawrence, PF, 30mcg/0.3mL 02/19/2021, 02/28/2021    Influenza 10/04/2011, 10/05/2012, 10/08/2013    Influenza Virus Vaccine 10/17/2014, 10/08/2015    Influenza Whole 09/27/2010    Influenza, MDCK Quadv, IM, PF (Flucelvax 4 yrs and older) 11/08/2019    Influenza, Marija Vandana, IM, (6 mo and older Fluzone, Flulaval, Fluarix and 3 yrs and older Afluria) 09/21/2016, 10/16/2017, 11/19/2018    Influenza, Marija Vandana, Recombinant, IM PF (Flublok 18 yrs and older) 10/14/2020        Health Maintenance   Topic Date Due    DTaP/Tdap/Td vaccine (1 - Tdap) Never done    Shingles Vaccine (1 of 2) Never done    Pneumococcal 65+ years Vaccine (1 of 1 - PPSV23) Never done   [employer] Visit (AWV)  Never done    Potassium monitoring  06/17/2021    Creatinine monitoring  06/17/2021    Flu vaccine  Completed    COVID-19 Vaccine  Completed    Hepatitis A vaccine  Aged Out    Hepatitis B vaccine  Aged Out    Hib vaccine  Aged Out    Meningococcal (ACWY) vaccine  Aged Out     Recommendations for [employer] Due: see orders and patient instructions/AVS.    EKG showed normal sinus rhythm with occasional PACs. No acute findings. Discussed cardiovascular risk. Patient with advanced age. Multiple medical problems including untreated hyperlipidemia per her request.  Controlled blood pressure.     Recommended screening schedule for the next 5-10 years is provided to the patient in written form: see Patient Instructions/AVS.    Written by Dari Torres CMA acting as scribe for Dr. Hollis Jameson on 4/13/2021 at 3:49 PM.    I, Dr. Justine Hoyt, personally performed the services described in the documentation as scribed by Dari Torres CMA, in my presence and it is both accurate and complete.

## 2021-04-13 NOTE — PATIENT INSTRUCTIONS
Advance Directives: Care Instructions  Overview  An advance directive is a legal way to state your wishes at the end of your life. It tells your family and your doctor what to do if you can't say what you want. There are two main types of advance directives. You can change them any time your wishes change. Living will. This form tells your family and your doctor your wishes about life support and other treatment. The form is also called a declaration. Medical power of . This form lets you name a person to make treatment decisions for you when you can't speak for yourself. This person is called a health care agent (health care proxy, health care surrogate). The form is also called a durable power of  for health care. If you do not have an advance directive, decisions about your medical care may be made by a family member, or by a doctor or a  who doesn't know you. It may help to think of an advance directive as a gift to the people who care for you. If you have one, they won't have to make tough decisions by themselves. Follow-up care is a key part of your treatment and safety. Be sure to make and go to all appointments, and call your doctor if you are having problems. It's also a good idea to know your test results and keep a list of the medicines you take. What should you include in an advance directive? Many states have a unique advance directive form. (It may ask you to address specific issues.) Or you might use a universal form that's approved by many states. If your form doesn't tell you what to address, it may be hard to know what to include in your advance directive. Use the questions below to help you get started. · Who do you want to make decisions about your medical care if you are not able to? · What life-support measures do you want if you have a serious illness that gets worse over time or can't be cured? · What are you most afraid of that might happen? (Maybe you're afraid of having pain, losing your independence, or being kept alive by machines.)  · Where would you prefer to die? (Your home? A hospital? A nursing home?)  · Do you want to donate your organs when you die? · Do you want certain Restorationist practices performed before you die? When should you call for help? Be sure to contact your doctor if you have any questions. Where can you learn more? Go to [email or web address]://[email or web address] org and sign in to your [employer] account. Enter R264 in the [employer] box to learn more about \"Advance Directives: Care Instructions. \"     If you do not have an account, please click on the \"Sign Up Now\" link. Current as of: July 17, 2020               Content Version: 12.8  © 2006-2021 Healthwise, [employer]. Care instructions adapted under license by [facility] ([facility]). If you have questions about a medical condition or this instruction, always ask your healthcare professional. [place] [address] any warranty or liability for your use of this information. Learning About Medical Power of   What is a medical power of ? A medical power of , also called a durable power of  for health care, is one type of the legal forms called advance directives. It lets you name the person you want to make treatment decisions for you if you can't speak or decide for yourself. The person you choose is called your health care agent. This person is also called a health care proxy or health care surrogate. A medical power of  may be called something else in your state. How do you choose a health care agent? Choose your health care agent carefully. This person may or may not be a family member. Talk to the person before you make your final decision. Make sure he or she is comfortable with this responsibility. It's a good idea to choose someone who:  · Is at least 25years old.   · Knows you well and understands what makes life meaningful for you. · Understands your Anabaptism and moral values. · Will do what you want, not what he or she wants. · Will be able to make difficult choices at a stressful time. · Will be able to refuse or stop treatment, if that is what you would want, even if you could die. · Will be firm and confident with health professionals if needed. · Will ask questions to get needed information. · Lives near you or agrees to travel to you if needed. Your family may help you make medical decisions while you can still be part of that process. But it's important to choose one person to be your health care agent in case you aren't able to make decisions for yourself. If you don't fill out the legal form and name a health care agent, the decisions your family can make may be limited. A health care agent may be called something else in your state. Who will make decisions for you if you don't have a health care agent? If you don't have a health care agent or a living will, you may not get the care you want. Decisions may be made by family members who disagree about your medical care. Or decisions may be made by a medical professional who doesn't know you well. In some cases, a  makes the decisions. When you name a health care agent, it is very clear who has the power to make health decisions for you. How do you name a health care agent? You name your health care agent on a legal form. This form is usually called a medical power of . Ask your hospital, state bar association, or office on aging where to find these forms. You must sign the form to make it legal. Some states require you to get the form notarized. This means that a person called a  watches you sign the form and then he or she signs the form. Some states also require that two or more witnesses sign the form. Be sure to tell your family members and doctors who your health care agent is.   Where can you learn more? Go to [email or web address]://[email or web address].[email or web address] org and sign in to your [employer] account. Enter [id number] in the [facility] box to learn more about \"Learning About [address]. \"     If you do not have an account, please click on the \"Sign Up Now\" link. Current as of: July 17, 2020               Content Version: 12.8  © 2006-2021 [employer]. Care instructions adapted under license by [facility] ([facility]). If you have questions about a medical condition or this instruction, always ask your healthcare professional. [address] any warranty or liability for your use of this information. Learning About Azar Flores  What is a living will? A living will, also called a declaration, is a legal form. It tells your family and your doctor your wishes when you can't speak for yourself. It's used by the health professionals who will treat you as you near the end of your life or if you get seriously hurt or ill. If you put your wishes in writing, your loved ones and others will know what kind of care you want. They won't need to guess. This can ease your mind and be helpful to others. And you can change or cancel your living will at any time. A living will is not the same as an estate or property will. An estate will explains what you want to happen with your money and property after you die. How do you use it? A living will is used to describe the kinds of treatment or life support you want as you near the end of your life or if you get seriously hurt or ill. Keep these facts in mind about living aguilera. · Your living will is used only if you can't speak or make decisions for yourself. Most often, one or more doctors must certify that you can't speak or decide for yourself before your living will takes effect. · If you get better and can speak for yourself again, you can accept or refuse any treatment.  It doesn't matter what you said in your can't breathe on your own, your heart stops, or you can't swallow. · What things would you still want to be able to do after you receive life-support methods? Would you want to be able to walk? To speak? To eat on your own? To live without the help of machines? · Do you want certain Latter day practices performed if you become very ill? · If you have a choice, where do you want to be cared for? In your home? At a hospital or nursing home? · If you have a choice at the end of your life, where would you prefer to die? At home? In a hospital or nursing home? Somewhere else? · Would you prefer to be buried or cremated? · Do you want your organs to be donated after you die? What should you do with your living will? · Make sure that your family members and your health care agent have copies of your living will (also called a declaration). · Give your doctor a copy of your living will. Ask him or her to keep it as part of your medical record. If you have more than one doctor, make sure that each one has a copy. · Put a copy of your living will where it can be easily found. For example, some people may put a copy on their refrigerator door. If you are using a digital copy, be sure your doctor, family members, and health care agent know how to find and access it. Where can you learn more? Go to [email or web address] and sign in to your [employer] account. Enter J543 in the [facility] box to learn more about \"Learning About Living [place]. \"     If you do not have an account, please click on the \"Sign Up Now\" link. Current as of: July 17, 2020               Content Version: 12.8  © 9959-9910 Healthwise, Incorporated. Care instructions adapted under license by [facility] ([facility]).  If you have questions about a medical condition or this instruction, always ask your healthcare professional. [address] any warranty or liability for your use of this information. Personalized Preventive Plan for Dilcia Bonilla - 4/13/2021  Medicare offers a range of preventive health benefits. Some of the tests and screenings are paid in full while other may be subject to a deductible, co-insurance, and/or copay. Some of these benefits include a comprehensive review of your medical history including lifestyle, illnesses that may run in your family, and various assessments and screenings as appropriate. After reviewing your medical record and screening and assessments performed today your provider may have ordered immunizations, labs, imaging, and/or referrals for you. A list of these orders (if applicable) as well as your Preventive Care list are included within your After Visit Summary for your review. Other Preventive Recommendations:    · A preventive eye exam performed by an eye specialist is recommended every 1-2 years to screen for glaucoma; cataracts, macular degeneration, and other eye disorders. · A preventive dental visit is recommended every 6 months. · Try to get at least 150 minutes of exercise per week or 10,000 steps per day on a pedometer . · Order or download the FREE \"Exercise & Physical Activity: Your Everyday Guide\" from [employer]. Call 7-611.661.2909 or search [employer] online. · You need 4803-9948 mg of calcium and 9988-9652 IU of vitamin D per day. It is possible to meet your calcium requirement with diet alone, but a vitamin D supplement is usually necessary to meet this goal.  · When exposed to the sun, use a sunscreen that protects against both UVA and UVB radiation with an SPF of 30 or greater. Reapply every 2 to 3 hours or after sweating, drying off with a towel, or swimming. · Always wear a seat belt when traveling in a car. Always wear a helmet when riding a bicycle or motorcycle.

## 2021-04-14 LAB
A/G RATIO: 1.3 (ref 0.8–2)
ALBUMIN SERPL-MCNC: 3.7 G/DL (ref 3.4–4.8)
ALP BLD-CCNC: 104 U/L (ref 25–100)
ALT SERPL-CCNC: 8 U/L (ref 4–36)
ANION GAP SERPL CALCULATED.3IONS-SCNC: 9 MMOL/L (ref 3–16)
AST SERPL-CCNC: 18 U/L (ref 8–33)
BASOPHILS ABSOLUTE: 0.1 K/UL (ref 0–0.1)
BASOPHILS RELATIVE PERCENT: 0.9 %
BILIRUB SERPL-MCNC: <0.2 MG/DL (ref 0.3–1.2)
BUN BLDV-MCNC: 17 MG/DL (ref 6–20)
CALCIUM SERPL-MCNC: 9.8 MG/DL (ref 8.5–10.5)
CHLORIDE BLD-SCNC: 94 MMOL/L (ref 98–107)
CO2: 29 MMOL/L (ref 20–30)
CREAT SERPL-MCNC: 0.6 MG/DL (ref 0.4–1.2)
EOSINOPHILS ABSOLUTE: 0.3 K/UL (ref 0–0.4)
EOSINOPHILS RELATIVE PERCENT: 2.9 %
FOLATE: 17.2 NG/ML
GFR AFRICAN AMERICAN: >59
GFR NON-AFRICAN AMERICAN: >60
GLOBULIN: 2.8 G/DL
GLUCOSE BLD-MCNC: 109 MG/DL (ref 74–106)
HCT VFR BLD CALC: 41 % (ref 37–47)
HEMOGLOBIN: 13.3 G/DL (ref 11.5–16.5)
IMMATURE GRANULOCYTES #: 0 K/UL
IMMATURE GRANULOCYTES %: 0.3 % (ref 0–5)
LYMPHOCYTES ABSOLUTE: 1.5 K/UL (ref 1.5–4)
LYMPHOCYTES RELATIVE PERCENT: 16 %
MCH RBC QN AUTO: 30.6 PG (ref 27–32)
MCHC RBC AUTO-ENTMCNC: 32.4 G/DL (ref 31–35)
MCV RBC AUTO: 94.5 FL (ref 80–100)
MONOCYTES ABSOLUTE: 0.8 K/UL (ref 0.2–0.8)
MONOCYTES RELATIVE PERCENT: 8.6 %
NEUTROPHILS ABSOLUTE: 6.5 K/UL (ref 2–7.5)
NEUTROPHILS RELATIVE PERCENT: 71.3 %
PDW BLD-RTO: 12.1 % (ref 11–16)
PLATELET # BLD: 286 K/UL (ref 150–400)
PMV BLD AUTO: 10.4 FL (ref 6–10)
POTASSIUM SERPL-SCNC: 4.7 MMOL/L (ref 3.4–5.1)
RBC # BLD: 4.34 M/UL (ref 3.8–5.8)
SODIUM BLD-SCNC: 132 MMOL/L (ref 136–145)
TOTAL PROTEIN: 6.5 G/DL (ref 6.4–8.3)
TSH SERPL DL<=0.05 MIU/L-ACNC: 3.48 UIU/ML (ref 0.27–4.2)
VITAMIN B-12: 1392 PG/ML (ref 211–911)
WBC # BLD: 9.1 K/UL (ref 4–11)

## 2021-05-27 ENCOUNTER — VIRTUAL VISIT (OUTPATIENT)
Dept: PRIMARY CARE CLINIC | Age: 86
End: 2021-05-27
Payer: MEDICARE

## 2021-05-27 DIAGNOSIS — I27.20 PULMONARY HTN (HCC): ICD-10-CM

## 2021-05-27 DIAGNOSIS — F32.A DEPRESSION, UNSPECIFIED DEPRESSION TYPE: ICD-10-CM

## 2021-05-27 DIAGNOSIS — I10 ESSENTIAL HYPERTENSION: ICD-10-CM

## 2021-05-27 DIAGNOSIS — R06.09 DYSPNEA ON EXERTION: ICD-10-CM

## 2021-05-27 DIAGNOSIS — K64.4 EXTERNAL HEMORRHOID: Primary | ICD-10-CM

## 2021-05-27 PROCEDURE — G2025 DIS SITE TELE SVCS RHC/FQHC: HCPCS | Performed by: INTERNAL MEDICINE

## 2021-05-27 RX ORDER — ALPRAZOLAM 0.25 MG/1
0.25 TABLET ORAL 2 TIMES DAILY PRN
Qty: 60 TABLET | Refills: 0 | Status: ON HOLD | OUTPATIENT
Start: 2021-05-27 | End: 2021-07-07 | Stop reason: HOSPADM

## 2021-05-27 SDOH — ECONOMIC STABILITY: FOOD INSECURITY: WITHIN THE PAST 12 MONTHS, THE FOOD YOU BOUGHT JUST DIDN'T LAST AND YOU DIDN'T HAVE MONEY TO GET MORE.: NEVER TRUE

## 2021-05-27 ASSESSMENT — ENCOUNTER SYMPTOMS
NAUSEA: 0
RECTAL PAIN: 1
SHORTNESS OF BREATH: 0
COUGH: 0
SINUS PRESSURE: 0
SORE THROAT: 0
BACK PAIN: 1
WHEEZING: 0
ABDOMINAL PAIN: 0
VOMITING: 0
EYE DISCHARGE: 0

## 2021-05-27 ASSESSMENT — SOCIAL DETERMINANTS OF HEALTH (SDOH): HOW HARD IS IT FOR YOU TO PAY FOR THE VERY BASICS LIKE FOOD, HOUSING, MEDICAL CARE, AND HEATING?: NOT HARD AT ALL

## 2021-05-27 NOTE — PROGRESS NOTES
Positive for rectal pain. Negative for abdominal pain, nausea and vomiting. Endocrine: Negative for cold intolerance and heat intolerance. Genitourinary: Negative for dysuria, frequency and urgency. Musculoskeletal: Positive for arthralgias, back pain and gait problem. Skin: Negative for rash and wound. Neurological: Positive for weakness. Negative for dizziness, tremors, syncope, numbness and headaches. Hematological: Negative. Psychiatric/Behavioral: Negative for agitation, self-injury, sleep disturbance and suicidal ideas. The patient is nervous/anxious. As in HPI       Past Medical History:   Diagnosis Date    Anxiety     Depression     Fatigue     GERD (gastroesophageal reflux disease)     Goiter     with Atypia on FNA    Hiatal hernia     Hyperlipidemia     Hypertension     Insomnia     Osteopenia     Rheumatic fever     AT AGE 12     Past Surgical History:   Procedure Laterality Date    BREAST SURGERY      EYE SURGERY      removed cloudiness off artificial lense    HYSTERECTOMY      THYROIDECTOMY  2012    dr Arcadio Yin        Family History   Problem Relation Age of Onset    High Blood Pressure Mother     Stroke Maternal Grandmother     Stroke Maternal Grandfather     Kidney Disease Maternal Grandfather     Heart Disease Father         CHF      Social History     Tobacco Use   Smoking Status Former Smoker    Packs/day: 2.00    Years: 31.00    Pack years: 62.00    Types: Cigarettes    Quit date: 1977    Years since quittin.4   Smokeless Tobacco Never Used       OBJECTIVE:   Wt Readings from Last 3 Encounters:   20 116 lb (52.6 kg)   20 119 lb (54 kg)   19 119 lb 12.8 oz (54.3 kg)     BP Readings from Last 3 Encounters:   21 132/80   20 136/68   20 138/70       There were no vitals taken for this visit.      Physical Exam  Patient does not seem to be in any acute distress during conversation. she is in good spirits. Speech is clear and breath sounds are unremarkable during conversation. Lab Results   Component Value Date     04/13/2021    K 4.7 04/13/2021    CL 94 04/13/2021    CO2 29 04/13/2021    GLUCOSE 109 04/13/2021    BUN 17 04/13/2021    CREATININE 0.6 04/13/2021    CALCIUM 9.8 04/13/2021    PROT 6.5 04/13/2021    LABALBU 3.7 04/13/2021    BILITOT <0.2 04/13/2021    ALT 8 04/13/2021    AST 18 04/13/2021       LDL Calculated (mg/dL)   Date Value   02/19/2020 172 (H)         Lab Results   Component Value Date    WBC 9.1 04/13/2021    NEUTROABS 6.5 04/13/2021    HGB 13.3 04/13/2021    HCT 41.0 04/13/2021    MCV 94.5 04/13/2021     04/13/2021       Lab Results   Component Value Date    TSH 3.48 04/13/2021       ASSESSMENT/PLAN:     1. External hemorrhoid  Discussed appropriate diet and appropriate bowel hygiene. I am going to treat her with Anusol cream.  Discussed using sitz bath. Discussed also surgical intervention as a last option. 2. Dyspnea on exertion  Patient with known pulmonary hypertension. Patient also may have some decline in functional status related to her advanced age in addition to staying home for the past year. Offered proceeding with blood work and chest x-ray but she wants to wait at this time. Consider proceeding with echocardiogram for further evaluation of her pulmonary hypertension/heart function if agreeable. 3. Pulmonary HTN ([address]  As per #2.    4. Essential hypertension  BP is stable per pt report. I have advised her on low-sodium diet, exercise and weight control. I am going to continue current medication. Will monitor her renal function every few months, have advised her to check blood pressure frequently and to keep a record of this. 5. Depression, unspecified depression type  Seems to be stable on current regimen. Continue for now and monitor.  - ALPRAZolam (XANAX) 0.25 MG tablet;  Take 1 tablet by mouth 2 times daily as needed for Sleep or Anxiety for up to 30 days. Dispense: 60 tablet; Refill: 0    Spent 23 minutes    Orders Placed This Encounter   Medications    ALPRAZolam (XANAX) 0.25 MG tablet     Sig: Take 1 tablet by mouth 2 times daily as needed for Sleep or Anxiety for up to 30 days. Dispense:  60 tablet     Refill:  0      Written by Sol Vargas, acting as a scribe for Dr. Paloma Polanco on 5/27/2021 at 1:14 PM.     I, Dr. Briseyda Carreon, personally performed the services described in the documentation as scribed by Malen Felty, CMA, in my presence and it is both accurate and complete.

## 2021-06-17 RX ORDER — CARVEDILOL 25 MG/1
TABLET ORAL
Qty: 180 TABLET | Refills: 1 | Status: ON HOLD | OUTPATIENT
Start: 2021-06-17 | End: 2021-07-07 | Stop reason: HOSPADM

## 2021-06-22 RX ORDER — SPIRONOLACTONE 25 MG/1
25 TABLET ORAL DAILY
Qty: 90 TABLET | Refills: 1 | Status: ON HOLD | OUTPATIENT
Start: 2021-06-22 | End: 2021-07-07 | Stop reason: SDUPTHER

## 2021-06-23 ENCOUNTER — HOSPITAL ENCOUNTER (EMERGENCY)
Facility: HOSPITAL | Age: 86
Discharge: HOME | End: 2021-06-23
Attending: EMERGENCY MEDICINE
Payer: MEDICARE

## 2021-06-23 VITALS
RESPIRATION RATE: 17 BRPM | DIASTOLIC BLOOD PRESSURE: 88 MMHG | HEART RATE: 102 BPM | OXYGEN SATURATION: 94 % | SYSTOLIC BLOOD PRESSURE: 175 MMHG

## 2021-06-23 DIAGNOSIS — K62.3 RECTAL PROLAPSE: Primary | ICD-10-CM

## 2021-06-23 DIAGNOSIS — E87.1 HYPONATREMIA: ICD-10-CM

## 2021-06-23 LAB
A/G RATIO: 1.1 (ref 0.8–2)
ALBUMIN SERPL-MCNC: 4 G/DL (ref 3.4–4.8)
ALP BLD-CCNC: 115 U/L (ref 25–100)
ALT SERPL-CCNC: 8 U/L (ref 4–36)
ANION GAP SERPL CALCULATED.3IONS-SCNC: 10 MMOL/L (ref 3–16)
AST SERPL-CCNC: 19 U/L (ref 8–33)
BASOPHILS ABSOLUTE: 0.1 K/UL (ref 0–0.1)
BASOPHILS RELATIVE PERCENT: 0.6 %
BILIRUB SERPL-MCNC: 0.5 MG/DL (ref 0.3–1.2)
BUN BLDV-MCNC: 13 MG/DL (ref 6–20)
CALCIUM SERPL-MCNC: 10.4 MG/DL (ref 8.5–10.5)
CHLORIDE BLD-SCNC: 88 MMOL/L (ref 98–107)
CO2: 27 MMOL/L (ref 20–30)
CREAT SERPL-MCNC: 0.6 MG/DL (ref 0.4–1.2)
EOSINOPHILS ABSOLUTE: 0.2 K/UL (ref 0–0.4)
EOSINOPHILS RELATIVE PERCENT: 2.3 %
GFR AFRICAN AMERICAN: >59
GFR NON-AFRICAN AMERICAN: >60
GLOBULIN: 3.6 G/DL
GLUCOSE BLD-MCNC: 126 MG/DL (ref 74–106)
HCT VFR BLD CALC: 45.5 % (ref 37–47)
HEMOGLOBIN: 15 G/DL (ref 11.5–16.5)
IMMATURE GRANULOCYTES #: 0 K/UL
IMMATURE GRANULOCYTES %: 0.4 % (ref 0–5)
LYMPHOCYTES ABSOLUTE: 1.5 K/UL (ref 1.5–4)
LYMPHOCYTES RELATIVE PERCENT: 14.6 %
MCH RBC QN AUTO: 30.7 PG (ref 27–32)
MCHC RBC AUTO-ENTMCNC: 33 G/DL (ref 31–35)
MCV RBC AUTO: 93.2 FL (ref 80–100)
MONOCYTES ABSOLUTE: 0.7 K/UL (ref 0.2–0.8)
MONOCYTES RELATIVE PERCENT: 6.8 %
NEUTROPHILS ABSOLUTE: 7.8 K/UL (ref 2–7.5)
NEUTROPHILS RELATIVE PERCENT: 75.3 %
PDW BLD-RTO: 11.9 % (ref 11–16)
PLATELET # BLD: 334 K/UL (ref 150–400)
PMV BLD AUTO: 9.7 FL (ref 6–10)
POTASSIUM REFLEX MAGNESIUM: 4 MMOL/L (ref 3.4–5.1)
RBC # BLD: 4.88 M/UL (ref 3.8–5.8)
SODIUM BLD-SCNC: 125 MMOL/L (ref 136–145)
TOTAL PROTEIN: 7.6 G/DL (ref 6.4–8.3)
WBC # BLD: 10.4 K/UL (ref 4–11)

## 2021-06-23 PROCEDURE — 80053 COMPREHEN METABOLIC PANEL: CPT

## 2021-06-23 PROCEDURE — 99283 EMERGENCY DEPT VISIT LOW MDM: CPT

## 2021-06-23 PROCEDURE — 2580000003 HC RX 258: Performed by: EMERGENCY MEDICINE

## 2021-06-23 PROCEDURE — 85025 COMPLETE CBC W/AUTO DIFF WBC: CPT

## 2021-06-23 PROCEDURE — 36415 COLL VENOUS BLD VENIPUNCTURE: CPT

## 2021-06-23 PROCEDURE — 6370000000 HC RX 637 (ALT 250 FOR IP): Performed by: EMERGENCY MEDICINE

## 2021-06-23 RX ORDER — LOSARTAN POTASSIUM 50 MG/1
100 TABLET ORAL DAILY
Status: DISCONTINUED | OUTPATIENT
Start: 2021-06-23 | End: 2021-06-23 | Stop reason: HOSPADM

## 2021-06-23 RX ORDER — CARVEDILOL 25 MG/1
25 TABLET ORAL ONCE
Status: COMPLETED | OUTPATIENT
Start: 2021-06-23 | End: 2021-06-23

## 2021-06-23 RX ORDER — 0.9 % SODIUM CHLORIDE 0.9 %
500 INTRAVENOUS SOLUTION INTRAVENOUS ONCE
Status: COMPLETED | OUTPATIENT
Start: 2021-06-23 | End: 2021-06-23

## 2021-06-23 RX ORDER — METOPROLOL SUCCINATE 25 MG/1
25 TABLET, EXTENDED RELEASE ORAL ONCE
Status: COMPLETED | OUTPATIENT
Start: 2021-06-23 | End: 2021-06-23

## 2021-06-23 RX ADMIN — SODIUM CHLORIDE 500 ML: 9 INJECTION, SOLUTION INTRAVENOUS at 17:30

## 2021-06-23 RX ADMIN — CARVEDILOL 25 MG: 25 TABLET, FILM COATED ORAL at 18:37

## 2021-06-23 RX ADMIN — METOPROLOL SUCCINATE 25 MG: 25 TABLET, EXTENDED RELEASE ORAL at 19:07

## 2021-06-23 RX ADMIN — LOSARTAN POTASSIUM 100 MG: 50 TABLET, FILM COATED ORAL at 18:37

## 2021-06-23 ASSESSMENT — PAIN SCALES - GENERAL: PAINLEVEL_OUTOF10: 5

## 2021-06-23 ASSESSMENT — PAIN DESCRIPTION - LOCATION: LOCATION: RECTUM

## 2021-06-23 ASSESSMENT — PAIN DESCRIPTION - PAIN TYPE: TYPE: ACUTE PAIN

## 2021-06-23 NOTE — ED NOTES
Pt reports that she started having bleeding from her anus. Reports that it started bleeding about 2 hours ago. Reports has been draining red/pink blood from rectum since. Reports nausea, dry heaving, no vomiting. Denies abdominal pain.         Sol Carver, BOLIVAR  06/23/21 [id number]

## 2021-06-23 NOTE — ED NOTES
Pt given crackers and water to try to eat something. Pt  Declined sandwich box.  Pt continues to wait for daughter to arrive to  at [address], RN  06/23/21 4050

## 2021-06-23 NOTE — ED PROVIDER NOTES
Triage Chief Complaint:   Rectal Bleeding and Nausea      [place]:  Bonita Pacheco is a 80 y.o. female that presents to the emergency department with concern for rectal bleeding. Patient states that she has what she thinks is an internal hemorrhoid that \"pops out. \"  She is pushed it back and multiple times before. She states today that she felt it pop out but it felt very large and she was not able to push it back in. She states that it is bleeding when she wipes and into her underwear. She has had a decreased appetite but that is been an ongoing issue for the patient. She denies abdominal pain. Past Medical History:   Diagnosis Date    Anxiety     Depression     Fatigue     GERD (gastroesophageal reflux disease)     Goiter     with Atypia on FNA    Hiatal hernia     Hyperlipidemia     Hypertension     Insomnia     Osteopenia     Rheumatic fever     AT AGE 12     Past Surgical History:   Procedure Laterality Date    BREAST SURGERY      EYE SURGERY      removed cloudiness off artificial lense    HYSTERECTOMY      THYROIDECTOMY  2012    dr Dean Wrigth       Family History   Problem Relation Age of Onset    High Blood Pressure Mother     Stroke Maternal Grandmother     Stroke Maternal Grandfather     Kidney Disease Maternal Grandfather     Heart Disease Father         CHF     Social History     Socioeconomic History    Marital status:       Spouse name: Not on file    Number of children: Not on file    Years of education: Not on file    Highest education level: Not on file   Occupational History    Not on file   Tobacco Use    Smoking status: Former Smoker     Packs/day: 2.00     Years: 31.00     Pack years: 62.00     Types: Cigarettes     Quit date: 1977     Years since quittin.5    Smokeless tobacco: Never Used   Substance and Sexual Activity    Alcohol use: No    Drug use: No    Sexual activity: Not on file   Other Topics Concern    Not on file   Social History Narrative    Not on file     Social Determinants of Health     Financial Resource Strain: Low Risk     Difficulty of Paying Living Expenses: Not hard at all   Food Insecurity: No Food Insecurity    Worried About Running Out of Food in the Last Year: Never true    [address] in the Last Year: Never true   Transportation Needs:     Lack of Transportation (Medical):  Lack of Transportation (Non-Medical):    Physical Activity:     Days of Exercise per Week:     Minutes of Exercise per Session:    Stress:     Feeling of Stress :    Social Connections:     Frequency of Communication with Friends and Family:     Frequency of Social Gatherings with Friends and Family:     Attends [employer] Services:     Active Member of Clubs or Organizations:     Attends Club or Organization Meetings:     Marital Status:    Intimate Partner Violence:     Fear of Current or Ex-Partner:     Emotionally Abused:     Physically Abused:     Sexually Abused:      Current Facility-Administered Medications   Medication Dose Route Frequency Provider Last Rate Last Admin    0.9 % sodium chloride bolus  500 mL Intravenous Once Anny Walker MD         Current Outpatient Medications   Medication Sig Dispense Refill    spironolactone (ALDACTONE) 25 MG tablet TAKE 1 TABLET BY MOUTH DAILY 90 tablet 1    carvedilol (COREG) 25 MG tablet take 1 tablet by mouth twice a day 180 tablet 1    ALPRAZolam (XANAX) 0.25 MG tablet Take 1 tablet by mouth 2 times daily as needed for Sleep or Anxiety for up to 30 days.  60 tablet 0     MG capsule TAKE 1 TO 2 CAPSULES BY MOUTH AT BEDTIME      Cholecalciferol (VITAMIN D) 50 MCG (2000 UT) CAPS capsule Take by mouth Twice daily      Lactobacillus (CULTURELLE DIGESTIVE WOMENS PO) Take by mouth daily      losartan (COZAAR) 100 MG tablet TAKE 1 TABLET BY MOUTH EVERY DAY 90 tablet 1    famotidine (PEPCID) 20 MG tablet TAKE 1 TABLET BY MOUTH TWICE DAILY 180 tablet 1    metoprolol succinate (TOPROL XL) 25 MG extended release tablet Take 1 tablet by mouth 2 times daily take 1 tablet twice a day 180 tablet 1    cyanocobalamin 1000 MCG/ML injection Inject 1 mL into the muscle every 7 days (Patient not taking: Reported on 5/27/2021) 12 mL 1    sertraline (ZOLOFT) 100 MG tablet Take 2 tablets by mouth daily 180 tablet 3    metroNIDAZOLE (METROGEL) 1 % gel Bid As directed for 4 weeks 1 Tube 1    estradiol (ESTRACE VAGINAL) 0.1 MG/GM vaginal cream Place 1 g vaginally daily 3 Tube 3    multivitamin (THERAGRAN) per tablet Take 1 tablet by mouth daily. Allergies   Allergen Reactions    Cephalexin     Chocolate     Erythromycin     Iodine     Peanut-Containing Drug Products     Penicillins     Shellfish-Derived Products     Tramadol Itching     Nursing Notes Reviewed    ROS:  At least 10 systems reviewed and otherwise negative except as in the [address] Physical Exam:  ED Triage Vitals   Enc Vitals Group      BP 06/23/21 1630 (!) 218/151      Pulse 06/23/21 1629 121      Resp 06/23/21 1645 22      Temp --       Temp src --       SpO2 06/23/21 1629 92 %      Weight --       Height --       Head Circumference --       Peak Flow --       Pain Score --       Pain Loc --       Pain Edu? --       Excl. in [address]? --      My pulse oximetry interpretation is which is within the normal range    GENERAL APPEARANCE: Awake and alert. Cooperative. No acute distress. HEAD:  Atraumatic. EYES: EOM's grossly intact. ENT: Mucous membranes are moist.  No trismus. NECK:  Trachea midline. HEART: RRR. Radial pulses 2+. LUNGS: Respirations unlabored. CTAB  ABDOMEN: Soft. Non-tender. No guarding or rebound. : Katarzyna Winn RN as chaperone. Rectal prolapse present  EXTREMITIES: No acute deformities. SKIN: Warm and dry. NEUROLOGICAL: No gross facial drooping. Moves all 4 extremities spontaneously. PSYCHIATRIC: Normal mood.     I have reviewed and interpreted all of the currently available lab results from this visit (if applicable):  Results for orders placed or performed during the hospital encounter of 06/23/21   CBC Auto Differential   Result Value Ref Range    WBC 10.4 4.0 - 11.0 K/uL    RBC 4.88 3.80 - 5.80 M/uL    Hemoglobin 15.0 11.5 - 16.5 g/dL    Hematocrit 45.5 37.0 - 47.0 %    MCV 93.2 80.0 - 100.0 fL    MCH 30.7 27.0 - 32.0 pg    MCHC 33.0 31.0 - 35.0 g/dL    RDW 11.9 11.0 - 16.0 %    Platelets 702 241 - 754 K/uL    MPV 9.7 6.0 - 10.0 fL    Neutrophils % 75.3 %    Immature Granulocytes % 0.4 0.0 - 5.0 %    Lymphocytes % 14.6 %    Monocytes % 6.8 %    Eosinophils % 2.3 %    Basophils % 0.6 %    Neutrophils Absolute 7.8 (H) 2.0 - 7.5 K/uL    Immature Granulocytes # 0.0 K/uL    Lymphocytes Absolute 1.5 1.5 - 4.0 K/uL    Monocytes Absolute 0.7 0.2 - 0.8 K/uL    Eosinophils Absolute 0.2 0.0 - 0.4 K/uL    Basophils Absolute 0.1 0.0 - 0.1 K/uL   Comprehensive Metabolic Panel w/ Reflex to MG   Result Value Ref Range    Sodium 125 (L) 136 - 145 mmol/L    Potassium reflex Magnesium 4.0 3.4 - 5.1 mmol/L    Chloride 88 (L) 98 - 107 mmol/L    CO2 27 20 - 30 mmol/L    Anion Gap 10 3 - 16    Glucose 126 (H) 74 - 106 mg/dL    BUN 13 6 - 20 mg/dL    CREATININE 0.6 0.4 - 1.2 mg/dL    GFR Non-African American >60 >59    GFR African American >59 >59    Calcium 10.4 8.5 - 10.5 mg/dL    Total Protein 7.6 6.4 - 8.3 g/dL    Albumin 4.0 3.4 - 4.8 g/dL    Albumin/Globulin Ratio 1.1 0.8 - 2.0    Total Bilirubin 0.5 0.3 - 1.2 mg/dL    Alkaline Phosphatase 115 (H) 25 - 100 U/L    ALT 8 4 - 36 U/L    AST 19 8 - 33 U/L    Globulin 3.6 g/dL          EKG: (All EKG's are interpreted by myself in the absence of a cardiologist)      MDM:  Patient's blood pressure was initially very hypertensive though she does admit to feeling anxious. Her blood pressure did improve however and was most recently 141/83. Heart rate is 96. On exam she has rectal prolapse.   I was able to place some lube on the bowel and push it back in. Patient denies any other complaints. I initially told her we did not need to do any blood work but she requested blood work because \" will want it. \"  Her sodium is slightly low at 125 of the rest of her blood work is normal.  I did speak with . He stated that he would be happy to admit the patient overnight for hydration and further observation. The patient does not wish to be admitted. She is concerned because her daughter does not get off work till 8:00 and she does not have a ride home at this time. I have ordered a 500 cc bolus of IV fluid. Patient will be observed in the emergency department. If she were to change her mind we can admit her to the hospital otherwise she will follow up with  in the next few days for recheck of her sodium. We did discuss increasing fiber in her diet and making sure that she has loose stools and doing Kegel exercises to help with the rectal prolapse but that the ultimate treatment is surgery which the patient has no desire to do. Clinical Impression:  1. Rectal prolapse Stable   2. Hyponatremia        Disposition Vitals:  [unfilled], [unfilled], [unfilled], [unfilled]    Disposition referral (if applicable):   Moo Brown MD  [id number]  [phone]    Schedule an appointment as soon as possible for a visit         Disposition medications (if applicable):  New Prescriptions    No medications on file         (Please note that portions of this note may have been completed with a voice recognition program. Efforts were made to edit the dictations but occasionally words are mis-transcribed.)    Ulla Habermann, MD Jude Stallion, MD  06/23/21 7447       Cooper Hua MD  06/23/21 5411

## 2021-06-24 NOTE — ED NOTES
Pt left ED in wheelchair with belongings at this time. Daughter and pt verbalized understanding of discharge instructions.       Salvador Aragon RN  06/23/21 2057

## 2021-06-25 RX ORDER — ONDANSETRON 4 MG/1
4 TABLET, ORALLY DISINTEGRATING ORAL 3 TIMES DAILY PRN
Qty: 30 TABLET | Refills: 0 | Status: ON HOLD | OUTPATIENT
Start: 2021-06-25 | End: 2021-07-07 | Stop reason: SDUPTHER

## 2021-06-26 ENCOUNTER — APPOINTMENT (OUTPATIENT)
Dept: GENERAL RADIOLOGY | Facility: HOSPITAL | Age: 86
DRG: 643 | End: 2021-06-26
Attending: INTERNAL MEDICINE
Payer: MEDICARE

## 2021-06-26 ENCOUNTER — HOSPITAL ENCOUNTER (INPATIENT)
Facility: HOSPITAL | Age: 86
LOS: 11 days | Discharge: HOSPICE | DRG: 643 | End: 2021-07-07
Attending: INTERNAL MEDICINE | Admitting: INTERNAL MEDICINE
Payer: MEDICARE

## 2021-06-26 DIAGNOSIS — M48.061 SPINAL STENOSIS OF LUMBAR REGION, UNSPECIFIED WHETHER NEUROGENIC CLAUDICATION PRESENT: ICD-10-CM

## 2021-06-26 DIAGNOSIS — M51.36 DEGENERATIVE DISC DISEASE, LUMBAR: Primary | ICD-10-CM

## 2021-06-26 DIAGNOSIS — F32.A DEPRESSION, UNSPECIFIED DEPRESSION TYPE: ICD-10-CM

## 2021-06-26 PROBLEM — E87.1 HYPONATREMIA: Status: ACTIVE | Noted: 2021-06-26

## 2021-06-26 PROBLEM — R53.1 WEAKNESS: Status: ACTIVE | Noted: 2021-06-26

## 2021-06-26 LAB
A/G RATIO: 1.2 (ref 0.8–2)
ALBUMIN SERPL-MCNC: 3.7 G/DL (ref 3.4–4.8)
ALP BLD-CCNC: 97 U/L (ref 25–100)
ALT SERPL-CCNC: <5 U/L (ref 4–36)
AMORPHOUS: ABNORMAL /HPF
ANION GAP SERPL CALCULATED.3IONS-SCNC: 6 MMOL/L (ref 3–16)
AST SERPL-CCNC: 22 U/L (ref 8–33)
BILIRUB SERPL-MCNC: 0.4 MG/DL (ref 0.3–1.2)
BILIRUBIN URINE: NEGATIVE
BLOOD, URINE: ABNORMAL
BUN BLDV-MCNC: 10 MG/DL (ref 6–20)
CALCIUM SERPL-MCNC: 9.2 MG/DL (ref 8.5–10.5)
CHLORIDE BLD-SCNC: 87 MMOL/L (ref 98–107)
CLARITY: CLEAR
CO2: 29 MMOL/L (ref 20–30)
COLOR: YELLOW
CREAT SERPL-MCNC: <0.5 MG/DL (ref 0.4–1.2)
EPITHELIAL CELLS, UA: ABNORMAL /HPF (ref 0–5)
FOLATE: >20 NG/ML
GFR AFRICAN AMERICAN: >59
GFR NON-AFRICAN AMERICAN: >60
GLOBULIN: 3.1 G/DL
GLUCOSE BLD-MCNC: 133 MG/DL (ref 74–106)
GLUCOSE URINE: NEGATIVE MG/DL
HCT VFR BLD CALC: 39.9 % (ref 37–47)
HEMOGLOBIN: 13.5 G/DL (ref 11.5–16.5)
HYALINE CASTS: ABNORMAL /LPF (ref 0–2)
IRON SATURATION: 28 % (ref 15–50)
IRON: 68 UG/DL (ref 37–145)
KETONES, URINE: NEGATIVE MG/DL
LEUKOCYTE ESTERASE, URINE: ABNORMAL
MCH RBC QN AUTO: 31.6 PG (ref 27–32)
MCHC RBC AUTO-ENTMCNC: 33.8 G/DL (ref 31–35)
MCV RBC AUTO: 93.4 FL (ref 80–100)
MICROSCOPIC EXAMINATION: YES
MUCUS: ABNORMAL /LPF
NITRITE, URINE: NEGATIVE
PDW BLD-RTO: 11.8 % (ref 11–16)
PH UA: 5.5 (ref 5–8)
PLATELET # BLD: 255 K/UL (ref 150–400)
PMV BLD AUTO: 9.4 FL (ref 6–10)
POTASSIUM REFLEX MAGNESIUM: 4.6 MMOL/L (ref 3.4–5.1)
PROTEIN UA: ABNORMAL MG/DL
RBC # BLD: 4.27 M/UL (ref 3.8–5.8)
RBC UA: ABNORMAL /HPF (ref 0–4)
SODIUM BLD-SCNC: 122 MMOL/L (ref 136–145)
SPECIFIC GRAVITY UA: 1.02 (ref 1–1.03)
TOTAL IRON BINDING CAPACITY: 247 UG/DL (ref 250–450)
TOTAL PROTEIN: 6.8 G/DL (ref 6.4–8.3)
TSH SERPL DL<=0.05 MIU/L-ACNC: 2.66 UIU/ML (ref 0.27–4.2)
URINE REFLEX TO CULTURE: ABNORMAL
URINE TYPE: ABNORMAL
UROBILINOGEN, URINE: 0.2 E.U./DL
VITAMIN B-12: 1297 PG/ML (ref 211–911)
WBC # BLD: 9 K/UL (ref 4–11)
WBC UA: ABNORMAL /HPF (ref 0–5)

## 2021-06-26 PROCEDURE — 84443 ASSAY THYROID STIM HORMONE: CPT

## 2021-06-26 PROCEDURE — 83550 IRON BINDING TEST: CPT

## 2021-06-26 PROCEDURE — 82746 ASSAY OF FOLIC ACID SERUM: CPT

## 2021-06-26 PROCEDURE — 93005 ELECTROCARDIOGRAM TRACING: CPT

## 2021-06-26 PROCEDURE — G0378 HOSPITAL OBSERVATION PER HR: HCPCS

## 2021-06-26 PROCEDURE — 6360000002 HC RX W HCPCS: Performed by: PHYSICIAN ASSISTANT

## 2021-06-26 PROCEDURE — 71045 X-RAY EXAM CHEST 1 VIEW: CPT

## 2021-06-26 PROCEDURE — 81001 URINALYSIS AUTO W/SCOPE: CPT

## 2021-06-26 PROCEDURE — 82607 VITAMIN B-12: CPT

## 2021-06-26 PROCEDURE — 80053 COMPREHEN METABOLIC PANEL: CPT

## 2021-06-26 PROCEDURE — G0379 DIRECT REFER HOSPITAL OBSERV: HCPCS

## 2021-06-26 PROCEDURE — 85027 COMPLETE CBC AUTOMATED: CPT

## 2021-06-26 PROCEDURE — 36415 COLL VENOUS BLD VENIPUNCTURE: CPT

## 2021-06-26 PROCEDURE — 6370000000 HC RX 637 (ALT 250 FOR IP): Performed by: PHYSICIAN ASSISTANT

## 2021-06-26 PROCEDURE — 1200000000 HC SEMI PRIVATE

## 2021-06-26 PROCEDURE — 83540 ASSAY OF IRON: CPT

## 2021-06-26 PROCEDURE — 2580000003 HC RX 258: Performed by: PHYSICIAN ASSISTANT

## 2021-06-26 RX ORDER — POLYETHYLENE GLYCOL 3350 17 G/17G
17 POWDER, FOR SOLUTION ORAL DAILY
Status: DISCONTINUED | OUTPATIENT
Start: 2021-06-26 | End: 2021-07-07 | Stop reason: HOSPADM

## 2021-06-26 RX ORDER — ASCORBIC ACID, VITAMIN A PALMITATE, CHOLECALCIFEROL, THIAMINE HYDROCHLORIDE, RIBOFLAVIN-5 PHOSPHATE SODIUM, PYRIDOXINE HYDROCHLORIDE, NIACINAMIDE, DEXPANTHENOL, ALPHA-TOCOPHEROL ACETATE, VITAMIN K1, FOLIC ACID, BIOTIN, CYANOCOBALAMIN 200; 3300; 200; 6; 3.6; 6; 40; 15; 10; 150; 600; 60; 5 MG/10ML; [IU]/10ML; [IU]/10ML; MG/10ML; MG/10ML; MG/10ML; MG/10ML; MG/10ML; [IU]/10ML; UG/10ML; UG/10ML; UG/10ML; UG/10ML
INJECTION, SOLUTION INTRAVENOUS
Status: DISPENSED
Start: 2021-06-26 | End: 2021-06-27

## 2021-06-26 RX ORDER — FOLIC ACID 5 MG/ML
INJECTION, SOLUTION INTRAMUSCULAR; INTRAVENOUS; SUBCUTANEOUS
Status: DISPENSED
Start: 2021-06-26 | End: 2021-06-27

## 2021-06-26 RX ORDER — SODIUM CHLORIDE 9 MG/ML
INJECTION, SOLUTION INTRAVENOUS
Status: DISPENSED
Start: 2021-06-26 | End: 2021-06-27

## 2021-06-26 RX ORDER — FAMOTIDINE 20 MG/1
20 TABLET, FILM COATED ORAL DAILY
Status: DISCONTINUED | OUTPATIENT
Start: 2021-06-26 | End: 2021-06-27

## 2021-06-26 RX ORDER — SODIUM CHLORIDE 9 MG/ML
INJECTION, SOLUTION INTRAVENOUS CONTINUOUS
Status: DISCONTINUED | OUTPATIENT
Start: 2021-06-26 | End: 2021-06-26

## 2021-06-26 RX ORDER — ESTRADIOL 0.1 MG/G
1 CREAM VAGINAL DAILY
Status: DISCONTINUED | OUTPATIENT
Start: 2021-06-26 | End: 2021-07-07 | Stop reason: HOSPADM

## 2021-06-26 RX ORDER — ONDANSETRON 2 MG/ML
4 INJECTION INTRAMUSCULAR; INTRAVENOUS EVERY 6 HOURS PRN
Status: DISCONTINUED | OUTPATIENT
Start: 2021-06-26 | End: 2021-07-07 | Stop reason: HOSPADM

## 2021-06-26 RX ORDER — ACETAMINOPHEN 650 MG/1
650 SUPPOSITORY RECTAL EVERY 6 HOURS PRN
Status: DISCONTINUED | OUTPATIENT
Start: 2021-06-26 | End: 2021-07-07 | Stop reason: HOSPADM

## 2021-06-26 RX ORDER — ALPRAZOLAM 0.25 MG/1
0.25 TABLET ORAL 2 TIMES DAILY PRN
Status: DISCONTINUED | OUTPATIENT
Start: 2021-06-26 | End: 2021-07-07 | Stop reason: HOSPADM

## 2021-06-26 RX ORDER — LACTOBACILLUS RHAMNOSUS GG 10B CELL
1 CAPSULE ORAL DAILY
Status: DISCONTINUED | OUTPATIENT
Start: 2021-06-26 | End: 2021-07-07 | Stop reason: HOSPADM

## 2021-06-26 RX ORDER — SODIUM CHLORIDE 9 MG/ML
INJECTION, SOLUTION INTRAVENOUS CONTINUOUS
Status: DISCONTINUED | OUTPATIENT
Start: 2021-06-26 | End: 2021-06-28

## 2021-06-26 RX ORDER — CLONIDINE HYDROCHLORIDE 0.1 MG/1
0.1 TABLET ORAL EVERY 6 HOURS PRN
Status: DISCONTINUED | OUTPATIENT
Start: 2021-06-26 | End: 2021-07-07 | Stop reason: HOSPADM

## 2021-06-26 RX ORDER — DOCUSATE SODIUM 100 MG/1
100 CAPSULE, LIQUID FILLED ORAL DAILY
Status: DISCONTINUED | OUTPATIENT
Start: 2021-06-26 | End: 2021-07-07 | Stop reason: HOSPADM

## 2021-06-26 RX ORDER — THIAMINE HYDROCHLORIDE 100 MG/ML
INJECTION, SOLUTION INTRAMUSCULAR; INTRAVENOUS
Status: DISPENSED
Start: 2021-06-26 | End: 2021-06-27

## 2021-06-26 RX ORDER — VITAMIN B COMPLEX
2000 TABLET ORAL DAILY
Status: DISCONTINUED | OUTPATIENT
Start: 2021-06-26 | End: 2021-07-07 | Stop reason: HOSPADM

## 2021-06-26 RX ORDER — LOSARTAN POTASSIUM 50 MG/1
100 TABLET ORAL DAILY
Status: DISCONTINUED | OUTPATIENT
Start: 2021-06-26 | End: 2021-06-28

## 2021-06-26 RX ORDER — ACETAMINOPHEN 325 MG/1
650 TABLET ORAL EVERY 6 HOURS PRN
Status: DISCONTINUED | OUTPATIENT
Start: 2021-06-26 | End: 2021-07-07 | Stop reason: HOSPADM

## 2021-06-26 RX ORDER — ONDANSETRON 4 MG/1
4 TABLET, ORALLY DISINTEGRATING ORAL EVERY 8 HOURS PRN
Status: DISCONTINUED | OUTPATIENT
Start: 2021-06-26 | End: 2021-07-07 | Stop reason: HOSPADM

## 2021-06-26 RX ORDER — M-VIT,TX,IRON,MINS/CALC/FOLIC 27MG-0.4MG
TABLET ORAL DAILY
Status: DISCONTINUED | OUTPATIENT
Start: 2021-06-26 | End: 2021-07-02 | Stop reason: ALTCHOICE

## 2021-06-26 RX ORDER — CARVEDILOL 25 MG/1
25 TABLET ORAL 2 TIMES DAILY WITH MEALS
Status: DISCONTINUED | OUTPATIENT
Start: 2021-06-26 | End: 2021-07-01

## 2021-06-26 RX ADMIN — POLYETHYLENE GLYCOL (3350) 17 G: 17 POWDER, FOR SOLUTION ORAL at 12:37

## 2021-06-26 RX ADMIN — ONDANSETRON HYDROCHLORIDE 4 MG: 2 INJECTION, SOLUTION INTRAMUSCULAR; INTRAVENOUS at 20:35

## 2021-06-26 RX ADMIN — SODIUM CHLORIDE: 9 INJECTION, SOLUTION INTRAVENOUS at 22:55

## 2021-06-26 RX ADMIN — CARVEDILOL 25 MG: 25 TABLET, FILM COATED ORAL at 17:30

## 2021-06-26 RX ADMIN — ALPRAZOLAM 0.25 MG: 0.25 TABLET ORAL at 12:37

## 2021-06-26 RX ADMIN — FAMOTIDINE 20 MG: 20 TABLET, FILM COATED ORAL at 12:36

## 2021-06-26 RX ADMIN — Medication 2000 UNITS: at 12:37

## 2021-06-26 RX ADMIN — ENOXAPARIN SODIUM 40 MG: 40 INJECTION SUBCUTANEOUS at 12:36

## 2021-06-26 RX ADMIN — Medication 1 TABLET: at 12:37

## 2021-06-26 RX ADMIN — Medication 1 CAPSULE: at 12:37

## 2021-06-26 RX ADMIN — ACETAMINOPHEN 650 MG: 325 TABLET, FILM COATED ORAL at 17:35

## 2021-06-26 RX ADMIN — ONDANSETRON HYDROCHLORIDE 4 MG: 2 INJECTION, SOLUTION INTRAMUSCULAR; INTRAVENOUS at 13:00

## 2021-06-26 RX ADMIN — DOCUSATE SODIUM 100 MG: 100 CAPSULE ORAL at 12:37

## 2021-06-26 ASSESSMENT — PAIN SCALES - GENERAL: PAINLEVEL_OUTOF10: 6

## 2021-06-26 NOTE — PROGRESS NOTES
Notified PA of sodium 122. Orders to complete rally pack then infuse NS continuously & repeat AM labs.

## 2021-06-26 NOTE — LETTER
#:       Plan to DC to home with Hospice today or tomorrow. Vesna Goodman RN  Care Coordinator  [address], [place]  Office:  [phone]  Fax:  [phone]  [email or web address]

## 2021-06-27 ENCOUNTER — APPOINTMENT (OUTPATIENT)
Dept: CT IMAGING | Facility: HOSPITAL | Age: 86
DRG: 643 | End: 2021-06-27
Attending: INTERNAL MEDICINE
Payer: MEDICARE

## 2021-06-27 PROBLEM — R11.0 NAUSEA: Status: ACTIVE | Noted: 2021-06-27

## 2021-06-27 PROBLEM — K59.00 CONSTIPATION: Status: ACTIVE | Noted: 2021-06-27

## 2021-06-27 LAB
A/G RATIO: 1.3 (ref 0.8–2)
ALBUMIN SERPL-MCNC: 3.1 G/DL (ref 3.4–4.8)
ALP BLD-CCNC: 77 U/L (ref 25–100)
ALT SERPL-CCNC: 9 U/L (ref 4–36)
ANION GAP SERPL CALCULATED.3IONS-SCNC: 4 MMOL/L (ref 3–16)
AST SERPL-CCNC: 20 U/L (ref 8–33)
BILIRUB SERPL-MCNC: 0.3 MG/DL (ref 0.3–1.2)
BUN BLDV-MCNC: 9 MG/DL (ref 6–20)
CALCIUM SERPL-MCNC: 9.1 MG/DL (ref 8.5–10.5)
CHLORIDE BLD-SCNC: 92 MMOL/L (ref 98–107)
CO2: 27 MMOL/L (ref 20–30)
CREAT SERPL-MCNC: <0.5 MG/DL (ref 0.4–1.2)
GFR AFRICAN AMERICAN: >59
GFR NON-AFRICAN AMERICAN: >60
GLOBULIN: 2.4 G/DL
GLUCOSE BLD-MCNC: 112 MG/DL (ref 74–106)
HCT VFR BLD CALC: 35.7 % (ref 37–47)
HEMOGLOBIN: 11.6 G/DL (ref 11.5–16.5)
LIPASE: 43 U/L (ref 5.6–51.3)
MCH RBC QN AUTO: 30.7 PG (ref 27–32)
MCHC RBC AUTO-ENTMCNC: 32.5 G/DL (ref 31–35)
MCV RBC AUTO: 94.4 FL (ref 80–100)
OSMOLALITY: 261 MOSM/KG (ref 280–301)
PDW BLD-RTO: 11.7 % (ref 11–16)
PLATELET # BLD: 213 K/UL (ref 150–400)
PMV BLD AUTO: 10.3 FL (ref 6–10)
POTASSIUM REFLEX MAGNESIUM: 4.1 MMOL/L (ref 3.4–5.1)
RBC # BLD: 3.78 M/UL (ref 3.8–5.8)
SODIUM BLD-SCNC: 123 MMOL/L (ref 136–145)
SODIUM BLD-SCNC: 125 MMOL/L (ref 136–145)
TOTAL PROTEIN: 5.5 G/DL (ref 6.4–8.3)
WBC # BLD: 8.3 K/UL (ref 4–11)

## 2021-06-27 PROCEDURE — 1200000000 HC SEMI PRIVATE

## 2021-06-27 PROCEDURE — 83690 ASSAY OF LIPASE: CPT

## 2021-06-27 PROCEDURE — 74176 CT ABD & PELVIS W/O CONTRAST: CPT

## 2021-06-27 PROCEDURE — 70450 CT HEAD/BRAIN W/O DYE: CPT

## 2021-06-27 PROCEDURE — 99222 1ST HOSP IP/OBS MODERATE 55: CPT | Performed by: INTERNAL MEDICINE

## 2021-06-27 PROCEDURE — 71250 CT THORAX DX C-: CPT

## 2021-06-27 PROCEDURE — 84295 ASSAY OF SERUM SODIUM: CPT

## 2021-06-27 PROCEDURE — 36415 COLL VENOUS BLD VENIPUNCTURE: CPT

## 2021-06-27 PROCEDURE — 85027 COMPLETE CBC AUTOMATED: CPT

## 2021-06-27 PROCEDURE — 80053 COMPREHEN METABOLIC PANEL: CPT

## 2021-06-27 PROCEDURE — 6370000000 HC RX 637 (ALT 250 FOR IP): Performed by: PHYSICIAN ASSISTANT

## 2021-06-27 PROCEDURE — 6360000002 HC RX W HCPCS: Performed by: PHYSICIAN ASSISTANT

## 2021-06-27 PROCEDURE — 83930 ASSAY OF BLOOD OSMOLALITY: CPT

## 2021-06-27 RX ORDER — PANTOPRAZOLE SODIUM 40 MG/1
40 TABLET, DELAYED RELEASE ORAL
Status: DISCONTINUED | OUTPATIENT
Start: 2021-06-28 | End: 2021-07-07 | Stop reason: HOSPADM

## 2021-06-27 RX ORDER — PROMETHAZINE HYDROCHLORIDE 25 MG/ML
12.5 INJECTION, SOLUTION INTRAMUSCULAR; INTRAVENOUS ONCE
Status: COMPLETED | OUTPATIENT
Start: 2021-06-27 | End: 2021-06-27

## 2021-06-27 RX ORDER — METOCLOPRAMIDE HYDROCHLORIDE 5 MG/ML
10 INJECTION INTRAMUSCULAR; INTRAVENOUS EVERY 6 HOURS
Status: DISCONTINUED | OUTPATIENT
Start: 2021-06-27 | End: 2021-06-28

## 2021-06-27 RX ORDER — CIPROFLOXACIN 250 MG/1
250 TABLET, FILM COATED ORAL EVERY 12 HOURS SCHEDULED
Status: DISCONTINUED | OUTPATIENT
Start: 2021-06-27 | End: 2021-06-29

## 2021-06-27 RX ORDER — SUCRALFATE 1 G/1
1 TABLET ORAL EVERY 6 HOURS SCHEDULED
Status: DISCONTINUED | OUTPATIENT
Start: 2021-06-27 | End: 2021-07-07 | Stop reason: HOSPADM

## 2021-06-27 RX ORDER — BISACODYL 10 MG
10 SUPPOSITORY, RECTAL RECTAL DAILY PRN
Status: DISCONTINUED | OUTPATIENT
Start: 2021-06-27 | End: 2021-07-07 | Stop reason: HOSPADM

## 2021-06-27 RX ADMIN — ENOXAPARIN SODIUM 40 MG: 40 INJECTION SUBCUTANEOUS at 10:33

## 2021-06-27 RX ADMIN — METOCLOPRAMIDE 10 MG: 5 INJECTION, SOLUTION INTRAMUSCULAR; INTRAVENOUS at 17:56

## 2021-06-27 RX ADMIN — PROMETHAZINE HYDROCHLORIDE 12.5 MG: 25 INJECTION INTRAMUSCULAR; INTRAVENOUS at 12:43

## 2021-06-27 RX ADMIN — ALPRAZOLAM 0.25 MG: 0.25 TABLET ORAL at 01:45

## 2021-06-27 RX ADMIN — ALPRAZOLAM 0.25 MG: 0.25 TABLET ORAL at 21:20

## 2021-06-27 RX ADMIN — ONDANSETRON HYDROCHLORIDE 4 MG: 2 INJECTION, SOLUTION INTRAMUSCULAR; INTRAVENOUS at 02:01

## 2021-06-27 RX ADMIN — METOCLOPRAMIDE 10 MG: 5 INJECTION, SOLUTION INTRAMUSCULAR; INTRAVENOUS at 10:33

## 2021-06-27 RX ADMIN — CARVEDILOL 25 MG: 25 TABLET, FILM COATED ORAL at 21:20

## 2021-06-27 RX ADMIN — ONDANSETRON HYDROCHLORIDE 4 MG: 2 INJECTION, SOLUTION INTRAMUSCULAR; INTRAVENOUS at 08:10

## 2021-06-27 ASSESSMENT — PAIN SCALES - GENERAL
PAINLEVEL_OUTOF10: 0
PAINLEVEL_OUTOF10: 0

## 2021-06-27 NOTE — FLOWSHEET NOTE
06/26/21 2228   Assessment   Charting Type Admission   Neurological   Neuro (WDL) WDL   Level of Consciousness Alert (0)   Swallow Screening   Is the patient able to remain alert for testing?  Yes   Stefany Coma Scale   Eye Opening 4   Best Verbal Response 5   Best Motor Response 6   Stefany Coma Scale Score 15   HEENT   HEENT (WDL) WDL   Respiratory   Respiratory (WDL) WDL   Cardiac   Cardiac (WDL) WDL   Rhythm Interpretation   Pulse 72   Cardiac Monitor   Telemetry Monitor On Yes   Telemetry Audible Yes   Telemetry Alarms Set Yes   Telemetry Box Number [id number]   Gastrointestinal   Abdominal (WDL) X   GI Symptoms Nausea   Relieved By Antiemetic   Abdomen Inspection Soft   RUQ Bowel Sounds Active   LUQ Bowel Sounds Active   RLQ Bowel Sounds Active   LLQ Bowel Sounds Active   Peripheral Vascular   Peripheral Vascular (WDL) WDL   Edema None   Skin Color/Condition   Skin Color/Condition (WDL) WDL   Skin Integrity   Skin Integrity (WDL) WDL   Musculoskeletal   Musculoskeletal (WDL) X   RUE Weakness   LUE Weakness   RL Extremity Weakness   LL Extremity Weakness   Genitourinary   Genitourinary (WDL) WDL   Psychosocial   Psychosocial (WDL) X   Patient Behaviors Anxious

## 2021-06-27 NOTE — H&P
.    History and Physical    Patient:  Carmen Chavez    CHIEF COMPLAINT:    Weakness  Poor po intake  Nausea  Constipation    HISTORY OF PRESENT ILLNESS:   The patient is a 80 y.o. female with PMH of anxiety, depression, fatigue, GERD, HLD, HTN, insomnia, osteopenia and hiatal hernia who presented as a direct admission from home for weakness, nausea and poor po intake. Pt reports poor po intake for the past 5-7 days. Yesterday, she was unable to tolerate anything secondary to severe nausea and called her PCP. She was evaluated at her home and then sent as a direct admission. Patient also endorsed generalized weakness and fatigue. She has been feeling unsteady on her feet secondary to the weakness. She also reports no bowel movement for the past week but attributed this to poor p.o. intake. Routine labs were obtained and revealed severe hyponatremia with sodium 122. Patient had already received a rally pack and then was transitioned to normal saline. With IV fluid hydration and antiemetics, patient's sodium has improved to 125 today. CT head, CT chest, abdomen and pelvis are pending at this time for further work-up. Patient also started on an aggressive GI regimen with Protonix, Reglan and Carafate. She is also been started on a bowel regimen with MiraLAX and docusate sodium.     Past Medical History:      Diagnosis Date    Anxiety     Depression     Fatigue     GERD (gastroesophageal reflux disease)     Goiter     with Atypia on FNA    Hiatal hernia     Hyperlipidemia     Hypertension     Insomnia     Osteopenia     Rheumatic fever     AT AGE 12       Past Surgical History:      Procedure Laterality Date    BREAST SURGERY      EYE SURGERY      removed cloudiness off artificial lense    HYSTERECTOMY      THYROIDECTOMY  03/22/2012    dr Cassandra Saunders         Medications Prior to Admission:    Prior to Admission medications Medication Sig Start Date End Date Taking? Authorizing Provider   ondansetron (ZOFRAN-ODT) 4 MG disintegrating tablet Take 1 tablet by mouth 3 times daily as needed for Nausea or Vomiting 6/25/21  Yes Allyssa Torres MD   spironolactone (ALDACTONE) 25 MG tablet TAKE 1 TABLET BY MOUTH DAILY 6/22/21  Yes Allyssa Torres MD   carvedilol (COREG) 25 MG tablet take 1 tablet by mouth twice a day 6/17/21  Yes Allyssa Torres MD   ALPRAZolam Trenia Fury) 0.25 MG tablet Take 1 tablet by mouth 2 times daily as needed for Sleep or Anxiety for up to 30 days. 5/27/21 6/26/21 Yes Allyssa Torres MD    MG capsule TAKE 1 TO 2 CAPSULES BY MOUTH AT BEDTIME 1/18/21  Yes Historical Provider, MD   Cholecalciferol (VITAMIN D) 50 MCG (2000 UT) CAPS capsule Take by mouth Twice daily   Yes Historical Provider, MD   Lactobacillus (CULTURELLE DIGESTIVE WOMENS PO) Take by mouth daily   Yes Historical Provider, MD   losartan (COZAAR) 100 MG tablet TAKE 1 TABLET BY MOUTH EVERY DAY 2/17/21  Yes Allyssa Torres MD   famotidine (PEPCID) 20 MG tablet TAKE 1 TABLET BY MOUTH TWICE DAILY 12/10/20  Yes Allyssa Torres MD   metoprolol succinate (TOPROL XL) 25 MG extended release tablet Take 1 tablet by mouth 2 times daily take 1 tablet twice a day 12/2/20  Yes Allyssa Torres MD   cyanocobalamin 1000 MCG/ML injection Inject 1 mL into the muscle every 7 days 11/24/20  Yes Allyssa Torres MD   sertraline (ZOLOFT) 100 MG tablet Take 2 tablets by mouth daily 2/19/20  Yes Allyssa Torres MD   multivitamin [facility]) per tablet Take 1 tablet by mouth daily.  7/16/09  Yes Historical Provider, MD   metroNIDAZOLE (METROGEL) 1 % gel Bid As directed for 4 weeks 10/8/19   Allyssa Torres MD   estradiol (ESTRACE VAGINAL) 0.1 MG/GM vaginal cream Place 1 g vaginally daily 8/26/19   Allyssa Torres MD       Allergies:  Cephalexin, Chocolate, Erythromycin, Iodine, Peanut-containing drug products, Penicillins, Shellfish-derived products, and Tramadol    Social History:   TOBACCO:   reports that she quit smoking about 44 years ago. Her smoking use included cigarettes. She has a 62.00 pack-year smoking history. She has never used smokeless tobacco.  ETOH:   reports no history of alcohol use. OCCUPATION:  None     Family History:       Problem Relation Age of Onset    High Blood Pressure Mother     Stroke Maternal Grandmother     Stroke Maternal Grandfather     Kidney Disease Maternal Grandfather     Heart Disease Father         CHF       Review of system  Constitutional:  Denies fever or chills. Positive for generalized weakness and fatigue. Eyes:  Denies eye pain or redness  HENT:  Denies nasal congestion or sore throat   Respiratory:  Denies cough or shortness of breath   Cardiovascular:  Denies chest pain or edema   GI:  Denies abdominal pain, vomiting, bloody stools or diarrhea. Positive for nausea and constipation. :  Denies dysuria or frequency  Musculoskeletal:  Denies acute neck pain or body aches  Integument:  Denies rash or itching  Neurologic:  Denies headache, dizziness, numbness, tingling or unilateral weakness  Psychiatric:  Denies acute depression or acute anxiety      Vital Signs  Temp: 97.6 °F (36.4 °C)  Pulse: 86  Resp: 16  BP: (!) 145/87  SpO2: 92 %  O2 Device: None (Room air)       vital signs reviewed in electronic chart. Physical exam  Constitutional:  Well developed, thin, elderly female sitting upright in bed in no acute distress  Eyes:  no scleral icterus, conjunctiva normal   HENT:  Atraumatic, external ears normal, nose normal, oropharynx moist, no pharyngeal exudates. Neck- supple, no JVD, no lymphadenopathy  Respiratory:  No respiratory distress, no wheezing, rales or rhonchi detected  Cardiovascular:  Normal rate, normal rhythm, no murmurs, no gallops, no rubs, no edema   GI:  Soft, nondistended, normal bowel sounds, nontender, no voluntary guarding  Musculoskeletal:  No cyanosis or obvious acute deformity.  Moving all extremities   Integument:  Warm and dry.  Lymphatic:  No cervical or axillary lymphadenopathy noted   Neurologic:  Alert & oriented x 3, no apparent focal deficits noted   Psychiatric:  Speech and behavior appropriate         Lab Results   Component Value Date     (L) 06/27/2021    K 4.1 06/27/2021    CL 92 (L) 06/27/2021    CO2 27 06/27/2021    BUN 9 06/27/2021    CREATININE <0.5 06/27/2021    GLUCOSE 112 (H) 06/27/2021    CALCIUM 9.1 06/27/2021    PROT 5.5 (L) 06/27/2021    LABALBU 3.1 (L) 06/27/2021    BILITOT 0.3 06/27/2021    ALKPHOS 77 06/27/2021    AST 20 06/27/2021    ALT 9 06/27/2021    LABGLOM >60 06/27/2021    GFRAA >59 06/27/2021    AGRATIO 1.3 06/27/2021    GLOB 2.4 06/27/2021           Lab Results   Component Value Date    WBC 8.3 06/27/2021    HGB 11.6 06/27/2021    HCT 35.7 (L) 06/27/2021    MCV 94.4 06/27/2021     06/27/2021       PA/lat CXR:   XR CHEST PORTABLE   Final Result      No focal airspace disease. Increased exaggerated elevation of the left hemidiaphragm.             CT HEAD WO CONTRAST    (Results Pending)   CT CHEST WO CONTRAST    (Results Pending)   CT ABDOMEN PELVIS WO CONTRAST Additional Contrast? None    (Results Pending)         Assessment and Plan     Active Hospital Problems    Diagnosis Date Noted    Constipation [K59.00]  -Patient reports no bowel movement for approximately 1 week in setting of poor p.o. intake  -Bowel regimen with daily MiraLAX and docusate senna; add as needed Dulcolax suppository   06/27/2021    Nausea [R11.0]  -Suspect secondary to severe hyponatremia; continue treatment as outlined below  -Patient also has history of hiatal hernia and has been started on aggressive bowel regimen with Protonix, Reglan and Carafate  -As needed antiemetics  -IV fluids  -Encourage good p.o. intake as symptoms improve   06/27/2021    Weakness [R53.1]  -Suspect secondary to severe hyponatremia  -Continue treatment for hyponatremia as listed below  -PT OT consulted  -Case management consulted for discharge planning assistance  -Fall precautions   06/26/2021    Hyponatremia [E87.1]  -Sodium 122 on 6/26  -Patient received rally pack then hydrated with IV fluids overnight. Current sodium level 125.  -Patient reports poor p.o. intake for approximately 1 week prior to admission. Suspect etiology for hyponatremia is hypovolemic hyponatremia in setting of poor p.o. intake and home diuretics +/- home SSRI. Patient denies any new medications and states she has taken her SSRI for many years.  -Continue hydration with IV fluids (normal saline at 100/h)  -Will check urine studies. -Rule out other causes with CT head, CT chest/abdomen/pelvis   06/26/2021    B12 deficiency [E53.8]  -resume B12 injections on discharge   05/17/2016    Essential hypertension [I10]  -Continue home Coreg, losartan   -of note, pt has both coreg and metoprolol listed on home MAR-->dc metoprolol on discharge   11/24/2015    Anxiety [F41.9]  -Continue home Xanax and Zoloft   08/01/2014     Patient was seen and examined by Dr. Carmen Ramos and plan of care reviewed.       NANY Huang certifies per CMS regulation for 42 .15(a), that the patient may reasonably be expected to be discharged or transferred to a hospital within 96 hours after admission to [address] [place] [address]    Electronically signed by NANY Huang on 6/27/2021 at 11:03 AM

## 2021-06-28 LAB
A/G RATIO: 1.1 (ref 0.8–2)
ALBUMIN SERPL-MCNC: 3.3 G/DL (ref 3.4–4.8)
ALP BLD-CCNC: 89 U/L (ref 25–100)
ALT SERPL-CCNC: 10 U/L (ref 4–36)
ANION GAP SERPL CALCULATED.3IONS-SCNC: 6 MMOL/L (ref 3–16)
ANION GAP SERPL CALCULATED.3IONS-SCNC: 6 MMOL/L (ref 3–16)
AST SERPL-CCNC: 23 U/L (ref 8–33)
BILIRUB SERPL-MCNC: 0.3 MG/DL (ref 0.3–1.2)
BUN BLDV-MCNC: 10 MG/DL (ref 6–20)
BUN BLDV-MCNC: 12 MG/DL (ref 6–20)
CALCIUM SERPL-MCNC: 8.9 MG/DL (ref 8.5–10.5)
CALCIUM SERPL-MCNC: 9.2 MG/DL (ref 8.5–10.5)
CHLORIDE BLD-SCNC: 89 MMOL/L (ref 98–107)
CHLORIDE BLD-SCNC: 90 MMOL/L (ref 98–107)
CO2: 27 MMOL/L (ref 20–30)
CO2: 27 MMOL/L (ref 20–30)
CREAT SERPL-MCNC: <0.5 MG/DL (ref 0.4–1.2)
CREAT SERPL-MCNC: <0.5 MG/DL (ref 0.4–1.2)
EKG ATRIAL RATE: 82 BPM
EKG DIAGNOSIS: NORMAL
EKG P AXIS: 65 DEGREES
EKG P-R INTERVAL: 142 MS
EKG Q-T INTERVAL: 364 MS
EKG QRS DURATION: 78 MS
EKG QTC CALCULATION (BAZETT): 425 MS
EKG R AXIS: 38 DEGREES
EKG T AXIS: 45 DEGREES
EKG VENTRICULAR RATE: 82 BPM
GFR AFRICAN AMERICAN: >59
GFR AFRICAN AMERICAN: >59
GFR NON-AFRICAN AMERICAN: >60
GFR NON-AFRICAN AMERICAN: >60
GLOBULIN: 2.9 G/DL
GLUCOSE BLD-MCNC: 136 MG/DL (ref 74–106)
GLUCOSE BLD-MCNC: 159 MG/DL (ref 74–106)
HCT VFR BLD CALC: 39.2 % (ref 37–47)
HEMOGLOBIN: 13.1 G/DL (ref 11.5–16.5)
MCH RBC QN AUTO: 31.2 PG (ref 27–32)
MCHC RBC AUTO-ENTMCNC: 33.4 G/DL (ref 31–35)
MCV RBC AUTO: 93.3 FL (ref 80–100)
PDW BLD-RTO: 11.7 % (ref 11–16)
PLATELET # BLD: 283 K/UL (ref 150–400)
PMV BLD AUTO: 9.8 FL (ref 6–10)
POTASSIUM REFLEX MAGNESIUM: 3.9 MMOL/L (ref 3.4–5.1)
POTASSIUM REFLEX MAGNESIUM: 4.1 MMOL/L (ref 3.4–5.1)
RBC # BLD: 4.2 M/UL (ref 3.8–5.8)
SODIUM BLD-SCNC: 122 MMOL/L (ref 136–145)
SODIUM BLD-SCNC: 123 MMOL/L (ref 136–145)
TOTAL PROTEIN: 6.2 G/DL (ref 6.4–8.3)
WBC # BLD: 11.7 K/UL (ref 4–11)

## 2021-06-28 PROCEDURE — 80053 COMPREHEN METABOLIC PANEL: CPT

## 2021-06-28 PROCEDURE — 2580000003 HC RX 258: Performed by: INTERNAL MEDICINE

## 2021-06-28 PROCEDURE — 97530 THERAPEUTIC ACTIVITIES: CPT

## 2021-06-28 PROCEDURE — 6370000000 HC RX 637 (ALT 250 FOR IP): Performed by: INTERNAL MEDICINE

## 2021-06-28 PROCEDURE — 97165 OT EVAL LOW COMPLEX 30 MIN: CPT

## 2021-06-28 PROCEDURE — 6370000000 HC RX 637 (ALT 250 FOR IP): Performed by: NURSE PRACTITIONER

## 2021-06-28 PROCEDURE — 6370000000 HC RX 637 (ALT 250 FOR IP): Performed by: PHYSICIAN ASSISTANT

## 2021-06-28 PROCEDURE — 99232 SBSQ HOSP IP/OBS MODERATE 35: CPT | Performed by: INTERNAL MEDICINE

## 2021-06-28 PROCEDURE — 36415 COLL VENOUS BLD VENIPUNCTURE: CPT

## 2021-06-28 PROCEDURE — 6360000002 HC RX W HCPCS: Performed by: PHYSICIAN ASSISTANT

## 2021-06-28 PROCEDURE — 1200000000 HC SEMI PRIVATE

## 2021-06-28 PROCEDURE — 85027 COMPLETE CBC AUTOMATED: CPT

## 2021-06-28 PROCEDURE — 97535 SELF CARE MNGMENT TRAINING: CPT

## 2021-06-28 RX ORDER — HYDRALAZINE HYDROCHLORIDE 25 MG/1
25 TABLET, FILM COATED ORAL EVERY 8 HOURS SCHEDULED
Status: DISCONTINUED | OUTPATIENT
Start: 2021-06-28 | End: 2021-07-07 | Stop reason: HOSPADM

## 2021-06-28 RX ORDER — HYDRALAZINE HYDROCHLORIDE 25 MG/1
25 TABLET, FILM COATED ORAL 2 TIMES DAILY PRN
Status: ON HOLD | COMMUNITY
End: 2021-07-07 | Stop reason: HOSPADM

## 2021-06-28 RX ORDER — PROMETHAZINE HYDROCHLORIDE 25 MG/1
12.5 TABLET ORAL EVERY 6 HOURS PRN
Status: DISCONTINUED | OUTPATIENT
Start: 2021-06-28 | End: 2021-07-07 | Stop reason: HOSPADM

## 2021-06-28 RX ORDER — METOCLOPRAMIDE HYDROCHLORIDE 5 MG/ML
5 INJECTION INTRAMUSCULAR; INTRAVENOUS EVERY 6 HOURS
Status: DISCONTINUED | OUTPATIENT
Start: 2021-06-28 | End: 2021-07-07 | Stop reason: HOSPADM

## 2021-06-28 RX ORDER — CLONIDINE HYDROCHLORIDE 0.2 MG/1
0.2 TABLET ORAL 2 TIMES DAILY PRN
Status: ON HOLD | COMMUNITY
End: 2021-07-07 | Stop reason: HOSPADM

## 2021-06-28 RX ORDER — 3% SODIUM CHLORIDE 3 G/100ML
35 INJECTION, SOLUTION INTRAVENOUS CONTINUOUS
Status: DISCONTINUED | OUTPATIENT
Start: 2021-06-28 | End: 2021-06-29

## 2021-06-28 RX ORDER — LOSARTAN POTASSIUM 50 MG/1
50 TABLET ORAL 2 TIMES DAILY
Status: DISCONTINUED | OUTPATIENT
Start: 2021-06-29 | End: 2021-07-07 | Stop reason: HOSPADM

## 2021-06-28 RX ADMIN — SUCRALFATE 1 G: 1 TABLET ORAL at 06:32

## 2021-06-28 RX ADMIN — SERTRALINE HYDROCHLORIDE 100 MG: 50 TABLET ORAL at 10:26

## 2021-06-28 RX ADMIN — LINACLOTIDE 145 MCG: 145 CAPSULE, GELATIN COATED ORAL at 06:38

## 2021-06-28 RX ADMIN — ALPRAZOLAM 0.25 MG: 0.25 TABLET ORAL at 21:25

## 2021-06-28 RX ADMIN — HYDRALAZINE HYDROCHLORIDE 25 MG: 25 TABLET, FILM COATED ORAL at 21:25

## 2021-06-28 RX ADMIN — CARVEDILOL 25 MG: 25 TABLET, FILM COATED ORAL at 17:31

## 2021-06-28 RX ADMIN — LOSARTAN POTASSIUM 50 MG: 50 TABLET, FILM COATED ORAL at 17:31

## 2021-06-28 RX ADMIN — PANTOPRAZOLE SODIUM 40 MG: 40 TABLET, DELAYED RELEASE ORAL at 06:32

## 2021-06-28 RX ADMIN — Medication 1 CAPSULE: at 10:26

## 2021-06-28 RX ADMIN — PROMETHAZINE HYDROCHLORIDE 12.5 MG: 25 TABLET ORAL at 17:24

## 2021-06-28 RX ADMIN — HYDRALAZINE HYDROCHLORIDE 25 MG: 25 TABLET, FILM COATED ORAL at 13:17

## 2021-06-28 RX ADMIN — HYDRALAZINE HYDROCHLORIDE 25 MG: 25 TABLET, FILM COATED ORAL at 06:38

## 2021-06-28 RX ADMIN — SODIUM CHLORIDE: 9 INJECTION, SOLUTION INTRAVENOUS at 02:42

## 2021-06-28 RX ADMIN — DOCUSATE SODIUM 100 MG: 100 CAPSULE ORAL at 10:26

## 2021-06-28 RX ADMIN — CIPROFLOXACIN HYDROCHLORIDE 250 MG: 250 TABLET, FILM COATED ORAL at 21:25

## 2021-06-28 RX ADMIN — SODIUM CHLORIDE 25 ML/HR: 3 INJECTION, SOLUTION INTRAVENOUS at 07:39

## 2021-06-28 RX ADMIN — METOCLOPRAMIDE 5 MG: 5 INJECTION, SOLUTION INTRAMUSCULAR; INTRAVENOUS at 10:43

## 2021-06-28 RX ADMIN — POLYETHYLENE GLYCOL (3350) 17 G: 17 POWDER, FOR SOLUTION ORAL at 10:26

## 2021-06-28 RX ADMIN — Medication 1 TABLET: at 10:26

## 2021-06-28 RX ADMIN — CIPROFLOXACIN HYDROCHLORIDE 250 MG: 250 TABLET, FILM COATED ORAL at 10:26

## 2021-06-28 RX ADMIN — Medication 2000 UNITS: at 10:26

## 2021-06-28 RX ADMIN — ENOXAPARIN SODIUM 40 MG: 40 INJECTION SUBCUTANEOUS at 10:30

## 2021-06-28 RX ADMIN — CARVEDILOL 25 MG: 25 TABLET, FILM COATED ORAL at 10:26

## 2021-06-28 NOTE — ACP (ADVANCE CARE PLANNING)
Advance Care Planning     General Advance Care Planning (ACP) Conversation    Date of Conversation: 6/26/2021  Conducted with: Patient with Decision Making Capacity per nursing on admit    Healthcare Decision Maker:      Primary Decision Maker: Jakub Pedro - Jones - [phone]    Primary Decision Maker: Vito Dye - [phone]    Secondary Decision Maker: Belkys Rojas - Brother/Sister - [phone]  Today we documented Decision Maker(s) consistent with Legal Next of Kin hierarchy.     Content/Action Overview:  No ACP documents     Reviewed DNR/DNI and patient elects Full Code (Attempt Resuscitation)    Length of Voluntary ACP Conversation in minutes:  <16 minutes (Non-Billable)    Feliciano Berg RN

## 2021-06-28 NOTE — CARE COORDINATION
PT orders received; attempted PT evaluation was attempted however pt stated she was too tired due to 4 bouts of diarrhea earlier. Pt and daughter had questions regarding positioning and deep breathing which were addressed. Plan to attempt PT evaluation again tomorrow.       Electronically signed by Shira Valiente PT on 2/13/0202 at 3:22 PM

## 2021-06-28 NOTE — PROGRESS NOTES
Progress Note      Subjective:   Chief complaint: Weakness, Poor oral intake, nausea, constipation    Interval History:   No acute events over night. Patient c/o fatigue, generalized weakness, nausea, decreased appetite. Patient on BSC this AM and loose stool present with approx 3in rectal prolapse. Reports happened last week in ED with more of it prolapsed and physician manually pushed it back in with no complications. Having urine output. No fevers, abdominal pain or vomiting. Reports some of her meds makes her more nauseas. Review of systems:     Constitutional:  Denies fever or chills. +fatigue, weakness  Eyes:  Denies change in visual acuity or discharge. HENT:  Denies nasal congestion or sore throat. Respiratory:  Denies cough or shortness of breath. Cardiovascular:  Denies chest pain, palpitation or swelling in LEs. GI:  Denies abdominal pain, vomiting, bloody stools or diarrhea. +nausea, decreased appetite  :  Denies dysuria or frequency. Musculoskeletal:  Denies acute back pain or joint pain. Integument:  Denies rash or itching. Neurologic:  Denies headache, focal weakness or sensory changes. Psychiatric:  Denies acute depression or anxiety. Past medical history, surgical history, family history and social history reviewed and unchanged compared to H&P earlier this admission.     Medications:   Scheduled Meds:   hydrALAZINE  25 mg Oral 3 times per day    linaclotide  145 mcg Oral QAM AC    ciprofloxacin  250 mg Oral 2 times per day    pantoprazole  40 mg Oral QAM AC    metoclopramide  10 mg Intravenous Q6H    sucralfate  1 g Oral 4 times per day    sertraline  100 mg Oral Daily    enoxaparin  40 mg Subcutaneous Daily    polyethylene glycol  17 g Oral Daily    folic acid, thiamine, multi-vitamin with vitamin K infusion   Intravenous Once    carvedilol  25 mg Oral BID     Vitamin D  2,000 Units Oral Daily    docusate sodium  100 mg Oral Daily    [Held by provider] estradiol  1 g Vaginal Daily    lactobacillus  1 capsule Oral Daily    losartan  100 mg Oral Daily    therapeutic multivitamin-minerals   Oral Daily     Continuous Infusions:   sodium chloride 25 mL/hr (06/28/21 0739)       Objective:     Vital Signs  Temp: 97.4 °F (36.3 °C)  Pulse: 92  Resp: 16  BP: (!) 178/88  SpO2: 94 %  O2 Device: None (Room air)       Vital signs reviewed in electronic charts. Physical exam  Constitutional:  Well developed, well nourished, no acute distress. Eyes:  PERRL, conjunctiva normal, EOMI. HENT:  Atraumatic, external ears normal, external nose/nares normal, oropharynx moist, no pharyngeal exudates. Neck:  Supple. No JVD or thyromegaly. Respiratory:  No respiratory distress, normal breath sounds, no rales, no wheezing. Cardiovascular:  Normal rate, normal rhythm, no murmurs, no gallops, no rubs. GI:  Soft, nondistended, normal bowel sounds, nontender. Approx 3 inch rectal prolapse present (no necrotic or ischemic appearance). Musculoskeletal:  No edema, no tenderness, no obvious deformities. Patient is moving all extremities. Integument:  Well hydrated, no rash. Neurologic:  Alert & oriented x 3,  no focal deficits noted. Strength is equal throughout. Psychiatric:  Speech and behavior appropriate. Results:     Lab Results   Component Value Date    WBC 11.7 (H) 06/28/2021    HGB 13.1 06/28/2021    HCT 39.2 06/28/2021    MCV 93.3 06/28/2021     06/28/2021     Lab Results   Component Value Date     06/28/2021    K 3.9 06/28/2021    CL 89 06/28/2021    CO2 27 06/28/2021    BUN 10 06/28/2021    CREATININE <0.5 06/28/2021    GLUCOSE 136 06/28/2021    CALCIUM 8.9 06/28/2021      CT ABDOMEN PELVIS WO CONTRAST Additional Contrast? None   Final Result      Large cavitary mass seen at left lung base with dependent fluid. This is most suggestive of hiatus hernia with distended cecum but is poorly evaluated.       Bilateral pleural effusions, left greater than right with patchy airspace disease also noted at the left lung base. Severe lumbar scoliosis. Otherwise no discrete evidence of acute abnormality or abnormal mass identified in the chest abdomen or pelvis. CT CHEST WO CONTRAST   Final Result      Large cavitary mass seen at left lung base with dependent fluid. This is most suggestive of hiatus hernia with distended cecum but is poorly evaluated. Bilateral pleural effusions, left greater than right with patchy airspace disease also noted at the left lung base. Severe lumbar scoliosis. Otherwise no discrete evidence of acute abnormality or abnormal mass identified in the chest abdomen or pelvis. CT HEAD WO CONTRAST   Final Result      No evidence of acute intracranial abnormality. XR CHEST PORTABLE   Final Result      No focal airspace disease. Increased exaggerated elevation of the left hemidiaphragm. Assessment and Plan: Active Hospital Problems    Diagnosis Date Noted    Gastroesophageal reflux disease without esophagitis [K21.9] 06/29/2021    Rectal prolapse [K62.3]  Patient on BSC this AM and loose stool present with approx 3in rectal prolapse. Reports happened last week in ED with more of it prolapsed and physician manually pushed it back in with no complications. Bedside RN present and I manually attempted and unable due to resistance. Then patient had large BM and prolapse no longer present hour later. Likely due to constipation. Stable at this time. Needs GI evaluation. 06/29/2021    Constipation [K59.00]  Resolved. Continue bowel regimen. Hold for loose stools. Fiber in diet. 06/27/2021    Nausea [R11.0]  Anti emetics, added carafate, avoid aggravating foods. Symptom management. 06/27/2021    Weakness [R53.1]  Manage all aspects of care from chronic to acute. Physical and occupational therapy following and working on improving overall strength and endurance.  Will consult Dietician for adequate nutrition. Monitor lab work.   06/26/2021    Hyponatremia [E87.1]  Na 122. On hypertonic saline 25ml/hr. On telemetry. Increase sodium in diet. Monitor labs closely. 06/26/2021    B12 deficiency [E53.8]  Cont home regimen. 05/17/2016    Essential hypertension [I10]  -SBP fluctuating. On losartan, coreg. Due to flucuating BP, add hydralazine 25 TID. IV hydralazine PRN. -Previously on metoprolol and coreg with hx palpitations, PACs. Upon admission, metoprolol discontinued and coreg resumed.  -Reports she would take clonidine 0.2mg PRN if BP elevated but then it would drop it too much at home. -Monitor on telemetry for increased heart rate due to discontinuing BB and possible rebound HTN. Adjust meds as needed.   -Cardiac diet. VS per unit protocol. CBC, CMP in AM   11/24/2015    Anxiety [F41.9]  Cont zoloft as directed. Xanax PRN. 08/01/2014     -Hiatal Hernia  Large and present on previous CT scans per chart review. 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 Continue supportive and symptomatic treatment and attempt to proceed with CT scan with oral contrast for further evaluation. Treat empirically with Reglan and GI prophylaxis. Will follow. Patient was seen and examined by Dr. Logan Tucker and plan of care reviewed.        Electronically signed by MARJORIE Armenta CNP on 6/28/2021 at 10:34 AM

## 2021-06-28 NOTE — PROGRESS NOTES
I have reviewed the patient's medications with the patient and her daughter, Nimo Conn. I have made the following changes: Added:  -Clonidine 0.2 mg tablet is prescribed as twice daily but patient is taking it differently than prescribed (noted on her home med list)              *patient is taking it bid prn for HBP  -Hydralazine 25 mg tablet is prescribed as twice daily but patient is taking it differently than prescribed (noted on her home med list)              *patient is taking it bid prn for HBP    Changed:   -none    Deleted:  -Metronidazole gel: patient finished taking this last month per patient's daughter    Of note: the patient is taking the following 2 meds different than prescribed  -Losartan 100 mg tablet is prescribed as one tablet po daily; She is actually taking 1/2 tablet qam and 1/2 tablet qpm  -B12 1,000 mcg injection is prescribed as once weekly; She is actually injecting it once a month due to elevated B12 levels with the every 7 day injection    Of note, pt is on both metoprolol succinate and carvedilol at home per daughter. Notified provider [employer].     Kira Acuña  PharmD Candidate

## 2021-06-28 NOTE — FLOWSHEET NOTE
06/28/21 1105   Assessment   Charting Type Shift assessment   Neurological   Neuro (WDL) WDL   Level of Consciousness Alert (0)   Stefany Coma Scale   Eye Opening 4   Best Verbal Response 5   Best Motor Response 6   Stefany Coma Scale Score 15   NIHSS Stroke Scale   NIHSS Stroke Scale Assessed No   HEENT   HEENT (WDL) WDL   Respiratory   Respiratory (WDL) WDL   Cardiac   Cardiac (WDL) WDL   Cardiac Monitor   Telemetry Monitor On Yes   Telemetry Audible Yes   Telemetry Alarms Set Yes   Telemetry Box Number [id number]   Telemetry Monitor Alarm Parameters    Gastrointestinal   Abdominal (WDL) X   GI Symptoms Nausea   Relieved By Antiemetic   Abdomen Inspection Soft   Tenderness Nontender   RUQ Bowel Sounds Active   LUQ Bowel Sounds Active   RLQ Bowel Sounds Active   LLQ Bowel Sounds Active   Peripheral Vascular   Peripheral Vascular (WDL) WDL   Edema None   Skin Color/Condition   Skin Color/Condition (WDL) WDL   Skin Integrity   Skin Integrity (WDL) WDL   Musculoskeletal   Musculoskeletal (WDL) X   RUE Weakness   LUE Weakness   RL Extremity Weakness   LL Extremity Weakness   Genitourinary   Genitourinary (WDL) WDL   Anus/Rectum   Anus/Rectum (WDL) X   Evaluation Other (Comment)  (prolaped)   Psychosocial   Psychosocial (WDL) X   Patient Behaviors Anxious   Pt awake in bed. Pt alert and oriented. Pt daughter at bedside. Pt appears in no acute distress. Pt currently on RA. Pt currently on telemetry monitoring showing NSR. Pt lung sounds clear throughout. Pt encouraged to cough and deep breathe. Pt rectum prolapsed again, NP attempted to place into rectum but met resistance. Pt states pressure. Will reassess pt. Pt call bell and bedside table within reach. Will continue to monitor pt.

## 2021-06-28 NOTE — PLAN OF CARE
Problem: SAFETY  Goal: Free from accidental physical injury  Outcome: Ongoing     Problem: DAILY CARE  Goal: Daily care needs are met  Outcome: Ongoing

## 2021-06-28 NOTE — PLAN OF CARE
Problem: Falls - Risk of:  Goal: Will remain free from falls  Description: Will remain free from falls  Outcome: Ongoing  Goal: Absence of physical injury  Description: Absence of physical injury  Outcome: Ongoing     Problem: SAFETY  Goal: Free from accidental physical injury  6/28/2021 0225 by Juliette Mendiola RN  Outcome: Ongoing     Problem: DAILY CARE  Goal: Daily care needs are met  6/28/2021 1104 by Ita Joy RN  Outcome: Ongoing  6/28/2021 0225 by Juliette Mendiola RN  Outcome: Ongoing

## 2021-06-28 NOTE — PROGRESS NOTES
Occupational Therapy  Initial Assessment  Date: 2021   Patient Name: Sher Cordova  MRN: [id number]     : 10/14/1930    Date of Service: 2021    Discharge Recommendations:  Continue to assess pending progress    Assessment  Performance deficits / Impairments: Decreased functional mobility ; Decreased balance;Decreased ADL status; Decreased endurance;Decreased high-level IADLs;Decreased strength  Prognosis: Good  Decision Making: Low Complexity  OT Education: OT Role;Plan of Care;Transfer Training;Family Education  REQUIRES OT FOLLOW UP: Yes  Activity Tolerance: Patient Tolerated treatment well     Pt seen for skilled OT interventions; no pain reported; daughter present. Pt reports admission due to rectal prolapse & generalized weakness. Pt states she previously performed all ADL tasks & cooked simple meals independently. Daughter is present currently within home for assist PRN but does not plan on living w pt. Pt displays signs of weakness & reports decreased nutritional intake. Pt able to perform bed mobility w Mod I; sit to stand, ambulating to/from bathroom, & toilet transfer w CGA using gait belt. OT provided family education about allowing pt to perform as independently as possible. O2 stats >/= 90% throughout session. Pt is a good candidate to benefit from skilled therapy services while IP to promote independence within daily tasks for safe return home alone. Pt left in care of nursing staff, daughter present. Patient Diagnosis(es): There were no encounter diagnoses. has a past medical history of Anxiety, Depression, Fatigue, GERD (gastroesophageal reflux disease), Goiter, Hiatal hernia, Hyperlipidemia, Hypertension, Insomnia, Osteopenia, and Rheumatic fever. has a past surgical history that includes Hysterectomy; Breast surgery; Tonsillectomy and adenoidectomy; Total hip arthroplasty; Thyroidectomy (2012); and Eye surgery.     Restrictions  Fall risk, cardiac    Subjective  Vital Signs  Temp: 97 °F (36.1 °C)  Pulse: 94  Resp: 14  BP: (!) 150/92  SpO2: 92 % Room air  Pain: None    Social/Functional History  Lives With: Alone, daughter PRN  Type of Home: 1 level house  Stairs: 3 to enter w handrail, some difficulty accessing  ADLs: Independent  IADLs: Independent; has a  PRN  Vision: Wears glasses at all times  Hearing: Within functional limits  DME: Has cane, rolling walker    Objective  Sitting Balance: Modified independent   Standing Balance: Stand by assistance  Functional Mobility: Contact guard assistance w Cane  Bed mobility: Modified independent- sleeps on couch at home  Transfers: Contact guard assistance w cane  Overall Cognitive Status: WFL    UE AROM : WFL  Gross UE Strength: Exceptions to [facility]; 4/5 MMT     Plan  Times per week: 3-5  Times per day: Daily  Plan weeks: 1  Current Treatment Recommendations: Balance Training, Self-Care / ADL, Patient/Caregiver Education & Training, Gait Training, Safety Education & Training, Strengthening, Functional Mobility Training, Home Management Training, Endurance Training    Goals  Short term goals  Time Frame for Short term goals: 1 week  Short term goal 1: Pt to demo HEP w Mod I  Short term goal 2: Pt to perform grooming standing at sink w/o LOB  Short term goal 3: Pt to perform dressing w Mod I  Short term goal 4: Pt to perform sponge bathing w Mod I  Short term goal 5: Pt to tolerate 10 minutes of activity    Therapy Time   Individual   Time In 0922   Time Out 1008   Minutes 55     Cher Wong, OTR/L  This note serves as D/C summary if patient is discharged prior to next visit.

## 2021-06-29 PROBLEM — K44.9 HERNIA, HIATAL: Status: ACTIVE | Noted: 2021-06-29

## 2021-06-29 PROBLEM — K21.9 GASTROESOPHAGEAL REFLUX DISEASE WITHOUT ESOPHAGITIS: Status: ACTIVE | Noted: 2021-06-29

## 2021-06-29 PROBLEM — K62.3 RECTAL PROLAPSE: Status: ACTIVE | Noted: 2021-06-29

## 2021-06-29 PROBLEM — I49.1 PAC (PREMATURE ATRIAL CONTRACTION): Status: ACTIVE | Noted: 2021-06-29

## 2021-06-29 LAB
ANION GAP SERPL CALCULATED.3IONS-SCNC: 6 MMOL/L (ref 3–16)
ANION GAP SERPL CALCULATED.3IONS-SCNC: 7 MMOL/L (ref 3–16)
BASOPHILS ABSOLUTE: 0 K/UL (ref 0–0.1)
BASOPHILS RELATIVE PERCENT: 0.1 %
BUN BLDV-MCNC: 12 MG/DL (ref 6–20)
BUN BLDV-MCNC: 16 MG/DL (ref 6–20)
CALCIUM SERPL-MCNC: 9.1 MG/DL (ref 8.5–10.5)
CALCIUM SERPL-MCNC: 9.8 MG/DL (ref 8.5–10.5)
CHLORIDE BLD-SCNC: 95 MMOL/L (ref 98–107)
CHLORIDE BLD-SCNC: 99 MMOL/L (ref 98–107)
CO2: 25 MMOL/L (ref 20–30)
CO2: 28 MMOL/L (ref 20–30)
CREAT SERPL-MCNC: <0.5 MG/DL (ref 0.4–1.2)
CREAT SERPL-MCNC: <0.5 MG/DL (ref 0.4–1.2)
EKG ATRIAL RATE: 153 BPM
EKG DIAGNOSIS: NORMAL
EKG Q-T INTERVAL: 276 MS
EKG QRS DURATION: 78 MS
EKG QTC CALCULATION (BAZETT): 395 MS
EKG R AXIS: 28 DEGREES
EKG T AXIS: 30 DEGREES
EKG VENTRICULAR RATE: 123 BPM
EOSINOPHILS ABSOLUTE: 0 K/UL (ref 0–0.4)
EOSINOPHILS RELATIVE PERCENT: 0.3 %
GFR AFRICAN AMERICAN: >59
GFR AFRICAN AMERICAN: >59
GFR NON-AFRICAN AMERICAN: >60
GFR NON-AFRICAN AMERICAN: >60
GLUCOSE BLD-MCNC: 135 MG/DL (ref 74–106)
GLUCOSE BLD-MCNC: 186 MG/DL (ref 74–106)
HCT VFR BLD CALC: 38.5 % (ref 37–47)
HEMOGLOBIN: 12.9 G/DL (ref 11.5–16.5)
IMMATURE GRANULOCYTES #: 0 K/UL
IMMATURE GRANULOCYTES %: 0.4 % (ref 0–5)
LYMPHOCYTES ABSOLUTE: 0.9 K/UL (ref 1.5–4)
LYMPHOCYTES RELATIVE PERCENT: 8.5 %
MCH RBC QN AUTO: 31.3 PG (ref 27–32)
MCHC RBC AUTO-ENTMCNC: 33.5 G/DL (ref 31–35)
MCV RBC AUTO: 93.4 FL (ref 80–100)
MONOCYTES ABSOLUTE: 0.7 K/UL (ref 0.2–0.8)
MONOCYTES RELATIVE PERCENT: 6.8 %
NEUTROPHILS ABSOLUTE: 8.6 K/UL (ref 2–7.5)
NEUTROPHILS RELATIVE PERCENT: 83.9 %
PDW BLD-RTO: 11.8 % (ref 11–16)
PLATELET # BLD: 265 K/UL (ref 150–400)
PMV BLD AUTO: 9.4 FL (ref 6–10)
POTASSIUM SERPL-SCNC: 4 MMOL/L (ref 3.4–5.1)
POTASSIUM SERPL-SCNC: 4.1 MMOL/L (ref 3.4–5.1)
RBC # BLD: 4.12 M/UL (ref 3.8–5.8)
SODIUM BLD-SCNC: 126 MMOL/L (ref 136–145)
SODIUM BLD-SCNC: 134 MMOL/L (ref 136–145)
SODIUM URINE: <20 MMOL/L (ref 40–220)
WBC # BLD: 10.3 K/UL (ref 4–11)

## 2021-06-29 PROCEDURE — 80048 BASIC METABOLIC PNL TOTAL CA: CPT

## 2021-06-29 PROCEDURE — 6370000000 HC RX 637 (ALT 250 FOR IP): Performed by: INTERNAL MEDICINE

## 2021-06-29 PROCEDURE — 6360000002 HC RX W HCPCS: Performed by: NURSE PRACTITIONER

## 2021-06-29 PROCEDURE — 84300 ASSAY OF URINE SODIUM: CPT

## 2021-06-29 PROCEDURE — 97530 THERAPEUTIC ACTIVITIES: CPT

## 2021-06-29 PROCEDURE — 2500000003 HC RX 250 WO HCPCS: Performed by: INTERNAL MEDICINE

## 2021-06-29 PROCEDURE — 93005 ELECTROCARDIOGRAM TRACING: CPT

## 2021-06-29 PROCEDURE — 97161 PT EVAL LOW COMPLEX 20 MIN: CPT

## 2021-06-29 PROCEDURE — 99233 SBSQ HOSP IP/OBS HIGH 50: CPT | Performed by: INTERNAL MEDICINE

## 2021-06-29 PROCEDURE — 6360000002 HC RX W HCPCS: Performed by: INTERNAL MEDICINE

## 2021-06-29 PROCEDURE — 1200000000 HC SEMI PRIVATE

## 2021-06-29 PROCEDURE — 6360000002 HC RX W HCPCS: Performed by: PHYSICIAN ASSISTANT

## 2021-06-29 PROCEDURE — 2580000003 HC RX 258: Performed by: INTERNAL MEDICINE

## 2021-06-29 PROCEDURE — 85025 COMPLETE CBC W/AUTO DIFF WBC: CPT

## 2021-06-29 PROCEDURE — 6370000000 HC RX 637 (ALT 250 FOR IP): Performed by: PHYSICIAN ASSISTANT

## 2021-06-29 PROCEDURE — 6370000000 HC RX 637 (ALT 250 FOR IP): Performed by: NURSE PRACTITIONER

## 2021-06-29 PROCEDURE — 36415 COLL VENOUS BLD VENIPUNCTURE: CPT

## 2021-06-29 RX ORDER — FUROSEMIDE 10 MG/ML
20 INJECTION INTRAMUSCULAR; INTRAVENOUS ONCE
Status: COMPLETED | OUTPATIENT
Start: 2021-06-29 | End: 2021-06-29

## 2021-06-29 RX ORDER — HYDRALAZINE HYDROCHLORIDE 20 MG/ML
10 INJECTION INTRAMUSCULAR; INTRAVENOUS ONCE
Status: COMPLETED | OUTPATIENT
Start: 2021-06-29 | End: 2021-06-29

## 2021-06-29 RX ORDER — METOPROLOL TARTRATE 5 MG/5ML
5 INJECTION INTRAVENOUS ONCE
Status: COMPLETED | OUTPATIENT
Start: 2021-06-29 | End: 2021-06-29

## 2021-06-29 RX ADMIN — Medication 1 CAPSULE: at 09:30

## 2021-06-29 RX ADMIN — CARVEDILOL 25 MG: 25 TABLET, FILM COATED ORAL at 17:00

## 2021-06-29 RX ADMIN — DOCUSATE SODIUM 100 MG: 100 CAPSULE ORAL at 09:31

## 2021-06-29 RX ADMIN — SUCRALFATE 1 G: 1 TABLET ORAL at 17:00

## 2021-06-29 RX ADMIN — FUROSEMIDE 20 MG: 10 INJECTION, SOLUTION INTRAMUSCULAR; INTRAVENOUS at 13:44

## 2021-06-29 RX ADMIN — LOSARTAN POTASSIUM 50 MG: 50 TABLET, FILM COATED ORAL at 21:25

## 2021-06-29 RX ADMIN — SUCRALFATE 1 G: 1 TABLET ORAL at 12:00

## 2021-06-29 RX ADMIN — ONDANSETRON HYDROCHLORIDE 4 MG: 2 INJECTION, SOLUTION INTRAMUSCULAR; INTRAVENOUS at 21:31

## 2021-06-29 RX ADMIN — METOPROLOL TARTRATE 5 MG: 1 INJECTION, SOLUTION INTRAVENOUS at 13:45

## 2021-06-29 RX ADMIN — CIPROFLOXACIN HYDROCHLORIDE 250 MG: 250 TABLET, FILM COATED ORAL at 09:30

## 2021-06-29 RX ADMIN — METOCLOPRAMIDE 5 MG: 5 INJECTION, SOLUTION INTRAMUSCULAR; INTRAVENOUS at 12:00

## 2021-06-29 RX ADMIN — PANTOPRAZOLE SODIUM 40 MG: 40 TABLET, DELAYED RELEASE ORAL at 05:50

## 2021-06-29 RX ADMIN — METOCLOPRAMIDE 5 MG: 5 INJECTION, SOLUTION INTRAMUSCULAR; INTRAVENOUS at 17:00

## 2021-06-29 RX ADMIN — SODIUM CHLORIDE 35 ML/HR: 3 INJECTION, SOLUTION INTRAVENOUS at 01:22

## 2021-06-29 RX ADMIN — HYDRALAZINE HYDROCHLORIDE 25 MG: 25 TABLET, FILM COATED ORAL at 13:45

## 2021-06-29 RX ADMIN — HYDRALAZINE HYDROCHLORIDE 10 MG: 20 INJECTION INTRAMUSCULAR; INTRAVENOUS at 09:30

## 2021-06-29 RX ADMIN — SUCRALFATE 1 G: 1 TABLET ORAL at 05:52

## 2021-06-29 RX ADMIN — ENOXAPARIN SODIUM 40 MG: 40 INJECTION SUBCUTANEOUS at 09:31

## 2021-06-29 RX ADMIN — HYDRALAZINE HYDROCHLORIDE 25 MG: 25 TABLET, FILM COATED ORAL at 21:25

## 2021-06-29 RX ADMIN — HYDRALAZINE HYDROCHLORIDE 25 MG: 25 TABLET, FILM COATED ORAL at 05:50

## 2021-06-29 RX ADMIN — CARVEDILOL 25 MG: 25 TABLET, FILM COATED ORAL at 09:30

## 2021-06-29 RX ADMIN — ALPRAZOLAM 0.25 MG: 0.25 TABLET ORAL at 21:25

## 2021-06-29 RX ADMIN — LOSARTAN POTASSIUM 50 MG: 50 TABLET, FILM COATED ORAL at 09:30

## 2021-06-29 RX ADMIN — SERTRALINE HYDROCHLORIDE 100 MG: 50 TABLET ORAL at 09:30

## 2021-06-29 NOTE — PROGRESS NOTES
Progress Note      Subjective:   Chief complaint: Weakness, Poor oral intake, nausea, constipation    Interval History:   Loose stools yesterday and no further rectal prolapse per patient. But she reports the loose stools made her more fatigued and nausea. Still poor appetite. Will hold stool softeners due to loose stools. Reports little swelling ankles. Reports hydralazine orally made her nauseas. Denies bloody stools, abdominal pain, SOB, CP, palpitations, vomiting. Having urine output. No further loose stools today. -170s. No CP. Patient nausea affecting taking oral meds. Review of systems:     Constitutional:  Denies fever or chills. +fatigue, weakness  Eyes:  Denies change in visual acuity or discharge. HENT:  Denies nasal congestion or sore throat. Respiratory:  Denies cough or shortness of breath. Cardiovascular:  Denies chest pain, palpitation. +little swelling in LEs. GI:  Denies abdominal pain, vomiting, bloody stools. +nausea, decreased appetite, loose stools but improved  :  Denies dysuria or frequency. Musculoskeletal:  Denies acute back pain or joint pain. Integument:  Denies rash or itching. Neurologic:  Denies headache, focal weakness or sensory changes. Psychiatric:  Denies acute depression or anxiety. Past medical history, surgical history, family history and social history reviewed and unchanged compared to H&P earlier this admission.     Medications:   Scheduled Meds:   hydrALAZINE  25 mg Oral 3 times per day    metoclopramide  5 mg Intravenous Q6H    losartan  50 mg Oral BID    linaclotide  145 mcg Oral QAM AC    ciprofloxacin  250 mg Oral 2 times per day    pantoprazole  40 mg Oral QAM AC    sucralfate  1 g Oral 4 times per day    sertraline  100 mg Oral Daily    enoxaparin  40 mg Subcutaneous Daily    polyethylene glycol  17 g Oral Daily    folic acid, thiamine, multi-vitamin with vitamin K infusion   Intravenous Once    carvedilol  25 mg Oral BID WC  Vitamin D  2,000 Units Oral Daily    docusate sodium  100 mg Oral Daily    [Held by provider] estradiol  1 g Vaginal Daily    lactobacillus  1 capsule Oral Daily    therapeutic multivitamin-minerals   Oral Daily     Continuous Infusions:      Objective:     Vital Signs  Temp: 98.1 °F (36.7 °C)  Pulse: 119  Resp: 16  BP: (!) 167/75  SpO2: 91 %  O2 Device: None (Room air)       Vital signs reviewed in electronic charts. Physical exam  Constitutional:  Well developed, well nourished, no acute distress. Eyes:  PERRL, conjunctiva normal, EOMI. HENT:  Atraumatic, external ears normal, external nose/nares normal, oropharynx moist  Neck:  Supple. No JVD or thyromegaly. Respiratory:  No respiratory distress, normal breath sounds, no rales, no wheezing. Cardiovascular:  Normal rate, normal rhythm, no murmurs, no gallops, no rubs. GI:  Soft, nondistended, normal bowel sounds, nontender. Musculoskeletal:  Trace ankle edema. Pedal pulses present. Cap refill <3 seconds. No tenderness, no obvious deformities. Patient is moving all extremities. Integument:  Well hydrated, no rash. Neurologic:  Alert & oriented x 3,  no focal deficits noted. Strength is equal throughout. Psychiatric:  Speech and behavior appropriate. Results:     Lab Results   Component Value Date    WBC 10.3 06/29/2021    HGB 12.9 06/29/2021    HCT 38.5 06/29/2021    MCV 93.4 06/29/2021     06/29/2021     Lab Results   Component Value Date     06/29/2021    K 4.0 06/29/2021    K 4.1 06/28/2021    CL 95 06/29/2021    CO2 25 06/29/2021    BUN 12 06/29/2021    CREATININE <0.5 06/29/2021    GLUCOSE 135 06/29/2021    CALCIUM 9.1 06/29/2021      CT ABDOMEN PELVIS WO CONTRAST Additional Contrast? None   Final Result      Large cavitary mass seen at left lung base with dependent fluid. This is most suggestive of hiatus hernia with distended cecum but is poorly evaluated.       Bilateral pleural effusions, left greater than right with patchy airspace disease also noted at the left lung base. Severe lumbar scoliosis. Otherwise no discrete evidence of acute abnormality or abnormal mass identified in the chest abdomen or pelvis. CT CHEST WO CONTRAST   Final Result      Large cavitary mass seen at left lung base with dependent fluid. This is most suggestive of hiatus hernia with distended cecum but is poorly evaluated. Bilateral pleural effusions, left greater than right with patchy airspace disease also noted at the left lung base. Severe lumbar scoliosis. Otherwise no discrete evidence of acute abnormality or abnormal mass identified in the chest abdomen or pelvis. CT HEAD WO CONTRAST   Final Result      No evidence of acute intracranial abnormality. XR CHEST PORTABLE   Final Result      No focal airspace disease. Increased exaggerated elevation of the left hemidiaphragm. EKG: see in chart     Assessment and Plan: Active Hospital Problems    Diagnosis Date Noted    Gastroesophageal reflux disease without esophagitis [K21.9]  Cont current regimen. Added Carafate and tolerating. Avoid aggravating foods. 06/29/2021    Rectal prolapse [K62.3]  Occurred 2x past week then improved after constipation resolved. GI regimen. 06/29/2021    Hernia, hiatal [K44.9]  Large and present on previous CT scans per chart review. 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  Continue supportive and symptomatic treatment and attempt to proceed with CT scan with oral contrast for further evaluation. Treat empirically with Reglan and GI prophylaxis.  Will follow.    06/29/2021    PAC (premature atrial contraction) [I49.1]  -More frequent today. On telemetry. EKG in chart. -120s with PACs. On coreg 25 BID but had been on metoprolol 25 BID with it at home but d/c on admission. -IV lopressor and lasix 1 time dose. Trace ankle edema. Monitor closely.   -Labs in AM. Repeat bmp 2 hours. 06/29/2021    Constipation [K59.00]  Improved. Continue regimen. 06/27/2021    Nausea [R11.0]  Intermittent. Cont regimens. 06/27/2021    Weakness [R53.1]  Manage all aspects of care from chronic to acute. Physical and occupational therapy following and working on improving overall strength and endurance. Dietician for adequate nutrition. Monitor lab work.   06/26/2021    Hyponatremia [E87.1]  Improved to 126 today. Cont regimen and adjust as needed. Recheck bmp few hours. 06/26/2021    B12 deficiency [E53.8]  Stable. Cont regimen. 05/17/2016    Essential hypertension [I10]  Still elevated. -180s. NAD, No CP. IV hydralazine PRN. Cont losartan, hydralazine and coreg. Adjust as needed. - Ensure taking all meds as directed. Cardiac diet. Labs in AM.    11/24/2015    Anxiety [F41.9]  Stable. Cont zoloft, xanax. 08/01/2014   GI/DVT prophylaxis. Patient was seen and examined by Dr. Logan Tucker and plan of care reviewed.      Electronically signed by MARJORIE Armenta CNP on 6/29/2021 at 2:13 PM

## 2021-06-29 NOTE — PLAN OF CARE
Problem: Falls - Risk of:  Goal: Will remain free from falls  Description: Will remain free from falls  Outcome: Ongoing  Goal: Absence of physical injury  Description: Absence of physical injury  Outcome: Ongoing     Problem: SAFETY  Goal: Free from accidental physical injury  Outcome: Ongoing     Problem: DAILY CARE  Goal: Daily care needs are met  6/29/2021 0915 by Sam Cogan, RN  Outcome: Ongoing  6/29/2021 0313 by Bev Schaeffer RN  Outcome: Ongoing     Problem: DISCHARGE BARRIERS  Goal: Patient's continuum of care needs are met  6/29/2021 0313 by Bev Schaeffer RN  Outcome: Ongoing

## 2021-06-29 NOTE — PLAN OF CARE
Problem: DAILY CARE  Goal: Daily care needs are met  Outcome: Ongoing     Problem: DISCHARGE BARRIERS  Goal: Patient's continuum of care needs are met  Outcome: Ongoing

## 2021-06-29 NOTE — CARE COORDINATION
Lives With: Alone, daughter PRN  Type of Home: 1 level house  Stairs: 3 to enter w handrail, some difficulty accessing  ADLs: Independent  IADLs: Independent; has a  PRN  Vision: Wears glasses at all times  Hearing: Within functional limits  DME: Has cane, rolling walker    [facility] [place] No DME per therapy.

## 2021-06-29 NOTE — PROGRESS NOTES
Physical Therapy    Facility/Department: [facility] MED SURG  Initial Assessment    NAME: Holden Vaz  : 10/14/1930  MRN: [id number]    Date of Service: 2021    Discharge Recommendations:  Continue to assess pending progress        Assessment   Body structures, Functions, Activity limitations: Decreased functional mobility ; Decreased endurance;Decreased high-level IADLs;Decreased strength  Assessment: Pt will benefit from skilled PT to address endurance, strength, and functional mobility to improve overall function. Treatment Diagnosis: functional decline  Prognosis: Good  Decision Making: Low Complexity  REQUIRES PT FOLLOW UP: Yes  Activity Tolerance  Activity Tolerance: Patient limited by fatigue;Patient Tolerated treatment well       Patient Diagnosis(es): There were no encounter diagnoses. has a past medical history of Anxiety, Depression, Fatigue, GERD (gastroesophageal reflux disease), Goiter, Hiatal hernia, Hyperlipidemia, Hypertension, Insomnia, Osteopenia, and Rheumatic fever. has a past surgical history that includes Hysterectomy; Breast surgery; Tonsillectomy and adenoidectomy; Total hip arthroplasty; Thyroidectomy (2012); and Eye surgery. Restrictions  Restrictions/Precautions  Restrictions/Precautions: Fall Risk, General Precautions     Vision/Hearing  Vision: Within Functional Limits  Hearing: Within functional limits       Subjective  General  Chart Reviewed: Yes  Patient assessed for rehabilitation services?: Yes  Referring Practitioner: Jessi Garrett PA-C  Referral Date : 21  Diagnosis: Hyponatremia  Subjective  Subjective: Pt presents supine in bed, daughter present in room. Pt states she is tired but is agreeable to PT evaluation. She reports being fatigued but somewhat better than yesterday as she has not had diarrhea as often as yesterday.   Pain Screening  Patient Currently in Pain: Denies  Vital Signs  Patient Currently in Pain: Denies Orientation  Orientation  Overall Orientation Status: Within Normal Limits  Social/Functional History    Social/Functional History  Lives With: Alone  Type of Home: House  Home Layout: One level  Home Access: Stairs to enter with rails  Entrance Stairs - Number of Steps: 2-3  Bathroom Shower/Tub:  (sponge bath)  Bathroom Toilet: Standard  Bathroom Accessibility: Accessible  Home Equipment: [employer] Help From: Family  ADL Assistance: Independent  Homemaking Assistance: Needs assistance  Ambulation Assistance: Independent  Transfer Assistance: Independent    Objective     Observation/Palpation  Observation: Pt pleasant and agreeable, supine in bed, room air, lethargic. Wheezing noted upon pt sitting up on EOB    AROM RLE (degrees)  RLE AROM: WFL  AROM LLE (degrees)  LLE AROM : WFL     Strength RLE  Strength RLE: Exception  Comment: 4/5 grossly  Strength LLE  Strength LLE: Exception  Comment: 4/5 grossly     Tone RLE  RLE Tone: Normotonic  Tone LLE  LLE Tone: Normotonic     Bed mobility  Supine to Sit: Stand by assistance  Sit to Supine: Contact guard assistance  Scooting: Stand by assistance     Transfers  Sit to Stand: Contact guard assistance  Stand to sit: Contact guard assistance     Ambulation  Ambulation?: Yes  Ambulation 1  Surface: level tile  Device: Single point cane  Assistance: Contact guard assistance  Gait Deviations: Slow Kary;Decreased step length;Decreased step height  Distance: 30 feet in room        Exercises  Comments: Deep breathing with incentive spirometer was performed sitting at EOB.      Plan   Plan  Times per week: 4-5 days per week  Plan weeks: 1  Current Treatment Recommendations: Strengthening, Endurance Training, Patient/Caregiver Education & Training, Equipment Evaluation, Education, & procurement, ROM, Balance Training, Gait Training, Safety Education & Training, Functional Mobility Training  Safety Devices  Type of devices: Left in bed, Call light within reach      Goals  Short term goals  Time Frame for Short term goals: 3 days  Short term goal 1: Pt to ambulate 50 feet with sp cane x CGA  Short term goal 2: Pt to transfer bed to chair x SBA with sp cane. Short term goal 3: Pt to demonstrate good safety awareness with functional mobility. Therapy Time   Individual Concurrent Group Co-treatment   Time In 1314         Time Out 1340         Minutes [address] [facility],        This note serves as D/C summary if patient is discharged prior to next visit.

## 2021-06-29 NOTE — PROGRESS NOTES
I did see and examine the patient with SHANAE Cobb. The case was discussed with her. Please refer to her note for details. Patient was admitted with weakness, decreased oral intake and hyponatremia. Recent ER visits related to similar problems with rectal prolapse. Patient continued to be nauseated despite IV fluid. She did have an episode of confusion/hallucinating after IV Phenergan was given yesterday. She did have recurrence of rectal prolapse which was reduced. Sodium level continue to be low despite the use of 3% saline. We will adjust the rate and monitor closely. 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 Continue supportive and symptomatic treatment and attempt to proceed with CT scan with oral contrast for further evaluation. Treat empirically with Reglan and GI prophylaxis. Aggressive pulmonary toileting. Bowel regimen. Further recommendations/details per Tika's note.

## 2021-06-29 NOTE — FLOWSHEET NOTE
06/29/21 1624   External Urinary Catheter   Placement Date/Time: 06/29/21 1410   Timeout: Patient  Inserted by: PRAIRIE [facility] RN   Catheter changed  Yes   Suction Other  (80)   Placement Initiated   Skin Assessment Other  (red)   External cath placed at 1610. Pt albert area noted to be reddened. Pt tolerated placement well. Will continue to monitor pt.

## 2021-06-30 ENCOUNTER — APPOINTMENT (OUTPATIENT)
Dept: CT IMAGING | Facility: HOSPITAL | Age: 86
DRG: 643 | End: 2021-06-30
Attending: INTERNAL MEDICINE
Payer: MEDICARE

## 2021-06-30 ENCOUNTER — OUTSIDE FACILITY SERVICE (OUTPATIENT)
Dept: CARDIOLOGY | Facility: CLINIC | Age: 86
End: 2021-06-30

## 2021-06-30 LAB
ANION GAP SERPL CALCULATED.3IONS-SCNC: 9 MMOL/L (ref 3–16)
BASOPHILS ABSOLUTE: 0 K/UL (ref 0–0.1)
BASOPHILS RELATIVE PERCENT: 0.1 %
BUN BLDV-MCNC: 19 MG/DL (ref 6–20)
CALCIUM SERPL-MCNC: 9.3 MG/DL (ref 8.5–10.5)
CHLORIDE BLD-SCNC: 99 MMOL/L (ref 98–107)
CO2: 25 MMOL/L (ref 20–30)
CREAT SERPL-MCNC: <0.5 MG/DL (ref 0.4–1.2)
EOSINOPHILS ABSOLUTE: 0 K/UL (ref 0–0.4)
EOSINOPHILS RELATIVE PERCENT: 0 %
GFR AFRICAN AMERICAN: >59
GFR NON-AFRICAN AMERICAN: >60
GLUCOSE BLD-MCNC: 114 MG/DL (ref 74–106)
HCT VFR BLD CALC: 36.7 % (ref 37–47)
HEMOGLOBIN: 11.9 G/DL (ref 11.5–16.5)
IMMATURE GRANULOCYTES #: 0.1 K/UL
IMMATURE GRANULOCYTES %: 0.5 % (ref 0–5)
LV EF: 58 %
LVEF MODALITY: NORMAL
LYMPHOCYTES ABSOLUTE: 1.2 K/UL (ref 1.5–4)
LYMPHOCYTES RELATIVE PERCENT: 9.2 %
MAGNESIUM: 1.7 MG/DL (ref 1.7–2.4)
MCH RBC QN AUTO: 31 PG (ref 27–32)
MCHC RBC AUTO-ENTMCNC: 32.4 G/DL (ref 31–35)
MCV RBC AUTO: 95.6 FL (ref 80–100)
MONOCYTES ABSOLUTE: 1.2 K/UL (ref 0.2–0.8)
MONOCYTES RELATIVE PERCENT: 9.6 %
NEUTROPHILS ABSOLUTE: 10.1 K/UL (ref 2–7.5)
NEUTROPHILS RELATIVE PERCENT: 80.6 %
PDW BLD-RTO: 12.5 % (ref 11–16)
PLATELET # BLD: 279 K/UL (ref 150–400)
PMV BLD AUTO: 9.9 FL (ref 6–10)
POTASSIUM SERPL-SCNC: 3.7 MMOL/L (ref 3.4–5.1)
RBC # BLD: 3.84 M/UL (ref 3.8–5.8)
SODIUM BLD-SCNC: 133 MMOL/L (ref 136–145)
WBC # BLD: 12.6 K/UL (ref 4–11)

## 2021-06-30 PROCEDURE — 6370000000 HC RX 637 (ALT 250 FOR IP): Performed by: INTERNAL MEDICINE

## 2021-06-30 PROCEDURE — 6360000002 HC RX W HCPCS: Performed by: PHYSICIAN ASSISTANT

## 2021-06-30 PROCEDURE — 99222 1ST HOSP IP/OBS MODERATE 55: CPT | Performed by: INTERNAL MEDICINE

## 2021-06-30 PROCEDURE — 94669 MECHANICAL CHEST WALL OSCILL: CPT

## 2021-06-30 PROCEDURE — 97110 THERAPEUTIC EXERCISES: CPT

## 2021-06-30 PROCEDURE — 97530 THERAPEUTIC ACTIVITIES: CPT

## 2021-06-30 PROCEDURE — 80048 BASIC METABOLIC PNL TOTAL CA: CPT

## 2021-06-30 PROCEDURE — 93306 TTE W/DOPPLER COMPLETE: CPT

## 2021-06-30 PROCEDURE — 6370000000 HC RX 637 (ALT 250 FOR IP): Performed by: NURSE PRACTITIONER

## 2021-06-30 PROCEDURE — 71250 CT THORAX DX C-: CPT

## 2021-06-30 PROCEDURE — 83735 ASSAY OF MAGNESIUM: CPT

## 2021-06-30 PROCEDURE — 6360000002 HC RX W HCPCS: Performed by: NURSE PRACTITIONER

## 2021-06-30 PROCEDURE — 1200000000 HC SEMI PRIVATE

## 2021-06-30 PROCEDURE — 85025 COMPLETE CBC W/AUTO DIFF WBC: CPT

## 2021-06-30 PROCEDURE — 2500000003 HC RX 250 WO HCPCS: Performed by: INTERNAL MEDICINE

## 2021-06-30 PROCEDURE — 6370000000 HC RX 637 (ALT 250 FOR IP): Performed by: PHYSICIAN ASSISTANT

## 2021-06-30 PROCEDURE — 36415 COLL VENOUS BLD VENIPUNCTURE: CPT

## 2021-06-30 PROCEDURE — 99232 SBSQ HOSP IP/OBS MODERATE 35: CPT | Performed by: INTERNAL MEDICINE

## 2021-06-30 PROCEDURE — 97116 GAIT TRAINING THERAPY: CPT

## 2021-06-30 PROCEDURE — 6360000002 HC RX W HCPCS: Performed by: INTERNAL MEDICINE

## 2021-06-30 PROCEDURE — 94762 N-INVAS EAR/PLS OXIMTRY CONT: CPT

## 2021-06-30 RX ORDER — ZINC OXIDE AND DIMETHICONE 120; 10 MG/G; MG/G
CREAM TOPICAL 2 TIMES DAILY PRN
Status: DISCONTINUED | OUTPATIENT
Start: 2021-06-30 | End: 2021-07-07 | Stop reason: HOSPADM

## 2021-06-30 RX ORDER — FUROSEMIDE 10 MG/ML
20 INJECTION INTRAMUSCULAR; INTRAVENOUS ONCE
Status: COMPLETED | OUTPATIENT
Start: 2021-06-30 | End: 2021-06-30

## 2021-06-30 RX ORDER — METOPROLOL TARTRATE 5 MG/5ML
5 INJECTION INTRAVENOUS ONCE
Status: COMPLETED | OUTPATIENT
Start: 2021-06-30 | End: 2021-06-30

## 2021-06-30 RX ADMIN — SUCRALFATE 1 G: 1 TABLET ORAL at 11:12

## 2021-06-30 RX ADMIN — LOSARTAN POTASSIUM 50 MG: 50 TABLET, FILM COATED ORAL at 21:42

## 2021-06-30 RX ADMIN — ENOXAPARIN SODIUM 40 MG: 40 INJECTION SUBCUTANEOUS at 08:46

## 2021-06-30 RX ADMIN — METOCLOPRAMIDE 5 MG: 5 INJECTION, SOLUTION INTRAMUSCULAR; INTRAVENOUS at 16:30

## 2021-06-30 RX ADMIN — METOCLOPRAMIDE 5 MG: 5 INJECTION, SOLUTION INTRAMUSCULAR; INTRAVENOUS at 11:12

## 2021-06-30 RX ADMIN — CARVEDILOL 25 MG: 25 TABLET, FILM COATED ORAL at 16:29

## 2021-06-30 RX ADMIN — SUCRALFATE 1 G: 1 TABLET ORAL at 16:30

## 2021-06-30 RX ADMIN — HYDRALAZINE HYDROCHLORIDE 25 MG: 25 TABLET, FILM COATED ORAL at 05:53

## 2021-06-30 RX ADMIN — METOPROLOL TARTRATE 5 MG: 1 INJECTION, SOLUTION INTRAVENOUS at 09:30

## 2021-06-30 RX ADMIN — METOCLOPRAMIDE 5 MG: 5 INJECTION, SOLUTION INTRAMUSCULAR; INTRAVENOUS at 06:12

## 2021-06-30 RX ADMIN — Medication 1 CAPSULE: at 08:45

## 2021-06-30 RX ADMIN — ZINC OXIDE AND DIMETHICONE: 120; 10 CREAM TOPICAL at 11:13

## 2021-06-30 RX ADMIN — CARVEDILOL 25 MG: 25 TABLET, FILM COATED ORAL at 08:46

## 2021-06-30 RX ADMIN — POLYETHYLENE GLYCOL (3350) 17 G: 17 POWDER, FOR SOLUTION ORAL at 08:46

## 2021-06-30 RX ADMIN — METOCLOPRAMIDE 5 MG: 5 INJECTION, SOLUTION INTRAMUSCULAR; INTRAVENOUS at 23:11

## 2021-06-30 RX ADMIN — FUROSEMIDE 20 MG: 10 INJECTION, SOLUTION INTRAMUSCULAR; INTRAVENOUS at 09:30

## 2021-06-30 RX ADMIN — ONDANSETRON 4 MG: 4 TABLET, ORALLY DISINTEGRATING ORAL at 23:11

## 2021-06-30 RX ADMIN — DOCUSATE SODIUM 100 MG: 100 CAPSULE ORAL at 08:45

## 2021-06-30 RX ADMIN — ALPRAZOLAM 0.25 MG: 0.25 TABLET ORAL at 08:51

## 2021-06-30 RX ADMIN — SERTRALINE HYDROCHLORIDE 100 MG: 50 TABLET ORAL at 08:46

## 2021-06-30 RX ADMIN — LOSARTAN POTASSIUM 50 MG: 50 TABLET, FILM COATED ORAL at 08:46

## 2021-06-30 RX ADMIN — PANTOPRAZOLE SODIUM 40 MG: 40 TABLET, DELAYED RELEASE ORAL at 05:53

## 2021-06-30 RX ADMIN — Medication 2000 UNITS: at 08:45

## 2021-06-30 RX ADMIN — SUCRALFATE 1 G: 1 TABLET ORAL at 05:53

## 2021-06-30 RX ADMIN — HYDRALAZINE HYDROCHLORIDE 25 MG: 25 TABLET, FILM COATED ORAL at 21:42

## 2021-06-30 ASSESSMENT — PAIN SCALES - GENERAL
PAINLEVEL_OUTOF10: 0

## 2021-06-30 NOTE — PROGRESS NOTES
Patient's daughter called out on the call bell requested assistance to help her mother to the [facility]. Patient noted to be generalized weak and required assistance times 2 to the [facility] and guidance to direction to turn.

## 2021-06-30 NOTE — PLAN OF CARE
Problem: Falls - Risk of:  Goal: Will remain free from falls  Description: Will remain free from falls  Outcome: Ongoing     Problem: DAILY CARE  Goal: Daily care needs are met  6/30/2021 1054 by Danna Kiran RN  Outcome: Ongoing  6/30/2021 0258 by Yaritza May RN  Outcome: Ongoing  6/30/2021 0243 by Yaritza May RN  Outcome: Ongoing

## 2021-06-30 NOTE — PROGRESS NOTES
Progress Note      Subjective:   Chief complaint: Weakness, Poor oral intake, nausea, constipation    Interval History:   Resting in bed. Plan is for CT chest oral contrast and ECHO today. Cardiology in AM. Nausea med prior to oral contrast. -120s occasionally, irregular,  IV lopressor PRN effective. Still decreased appetite, faitgue, weakness. Cooperating PT/OT. Denies bloody stools, further rectal prolapse, abdominal pain, SOB, CP, palpitations, vomiting. Having urine output. No further loose stools today. Review of systems:     Constitutional:  Denies fever or chills. +fatigue, weakness  Eyes:  Denies change in visual acuity or discharge. HENT:  Denies nasal congestion or sore throat. Respiratory:  Denies cough or shortness of breath. Cardiovascular:  Denies chest pain, palpitation. +little swelling in LEs. GI:  Denies abdominal pain, vomiting, bloody stools. +nausea, decreased appetite, loose stools but improved  :  Denies dysuria or frequency. Musculoskeletal:  Denies acute back pain or joint pain. Integument:  Denies rash or itching. Neurologic:  Denies headache, focal weakness or sensory changes. Psychiatric:  Denies acute depression or anxiety. Past medical history, surgical history, family history and social history reviewed and unchanged compared to H&P earlier this admission.     Medications:   Scheduled Meds:   hydrALAZINE  25 mg Oral 3 times per day    metoclopramide  5 mg Intravenous Q6H    losartan  50 mg Oral BID    linaclotide  145 mcg Oral QAM AC    pantoprazole  40 mg Oral QAM AC    sucralfate  1 g Oral 4 times per day    sertraline  100 mg Oral Daily    enoxaparin  40 mg Subcutaneous Daily    polyethylene glycol  17 g Oral Daily    folic acid, thiamine, multi-vitamin with vitamin K infusion   Intravenous Once    carvedilol  25 mg Oral BID     Vitamin D  2,000 Units Oral Daily    docusate sodium  100 mg Oral Daily    [Held by provider] estradiol  1 g lung base with dependent fluid. This is most suggestive of hiatus hernia with distended cecum but is poorly evaluated. Bilateral pleural effusions, left greater than right with patchy airspace disease also noted at the left lung base. Severe lumbar scoliosis. Otherwise no discrete evidence of acute abnormality or abnormal mass identified in the chest abdomen or pelvis. CT CHEST WO CONTRAST   Final Result      Large cavitary mass seen at left lung base with dependent fluid. This is most suggestive of hiatus hernia with distended cecum but is poorly evaluated. Bilateral pleural effusions, left greater than right with patchy airspace disease also noted at the left lung base. Severe lumbar scoliosis. Otherwise no discrete evidence of acute abnormality or abnormal mass identified in the chest abdomen or pelvis. CT HEAD WO CONTRAST   Final Result      No evidence of acute intracranial abnormality. XR CHEST PORTABLE   Final Result      No focal airspace disease. Increased exaggerated elevation of the left hemidiaphragm. EKG: see in chart     Assessment and Plan: Active Hospital Problems    Diagnosis Date Noted    Gastroesophageal reflux disease without esophagitis [K21.9]  Cont current regimen. Added Carafate and tolerating. Avoid aggravating foods. 06/29/2021    Rectal prolapse [K62.3]  Occurred 2x past week then improved after constipation resolved. GI regimen. 06/29/2021    Hernia, hiatal [K44.9]  Large and present on previous CT scans per chart review.    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    -Continue supportive and symptomatic treatment and attempt to proceed with CT scan with oral contrast for further evaluation. Treat empirically with Reglan and GI prophylaxis.     -CT chest completed and above.    06/29/2021    PAC (premature atrial contraction) [I49.1]  -More frequent today. On telemetry. EKG in chart. -120s with PACs. On coreg 25 BID but had been on metoprolol 25 BID with it at home but d/c on admission. -IV lopressor and lasix 1 time dose. Trace ankle edema. Monitor closely. ECHO today. Consult cardiology in AM  -Labs in AM.    06/29/2021    Constipation [K59.00]  Improved. Continue regimen. 06/27/2021    Nausea [R11.0]  Intermittent. Cont regimens. 06/27/2021    Weakness [R53.1]  Manage all aspects of care from chronic to acute. Physical and occupational therapy following and working on improving overall strength and endurance. Dietician for adequate nutrition. Monitor lab work.   06/26/2021    Hyponatremia [E87.1]  Improved to 133 today. Hypertonic fluids discontinued. Labs in AM    06/26/2021    B12 deficiency [E53.8]  Stable. Cont regimen. 05/17/2016    Essential hypertension [I10]  Still elevated. -180s. NAD, No CP. IV hydralazine PRN. Cont losartan, hydralazine and coreg. Adjust as needed. - Ensure taking all meds as directed. Cardiac diet. Labs in AM.    11/24/2015    Anxiety [F41.9]  Stable. Cont zoloft, xanax. 08/01/2014   GI/DVT prophylaxis. Patient was seen and examined by Dr. Pineda Gonzalez and plan of care reviewed.      Electronically signed by MARJORIE Price - CNP on 6/30/2021 at 11:30 AM

## 2021-06-30 NOTE — PROGRESS NOTES
Physical Therapy  Facility/Department: [facility] MED SURG  Daily Treatment Note  NAME: Tarun Trevino  : 10/14/1930  MRN: [id number]    Date of Service: 2021    Discharge Recommendations:  Continue to assess pending progress      Assessment   Assessment: PTA engaged patient in B LE therex in supine. Patient completed bed mobility tasks with CGA and sat EOB to complete LE exercises. Patient stood with Min A and ambulated 30 feet with cane and Min A.  SpO2 after ambulation was 85% on room air. 2LO2 reapplied and SpO2 rebounded to 95%. Patient rested a few minutes and ambulated another 30 feet with cane, 2LO2, and Min A.  SpO2 at 97% with use of supplemental O2.  REQUIRES PT FOLLOW UP: Yes  Activity Tolerance  Activity Tolerance: Patient Tolerated treatment well;Patient limited by fatigue     Patient Diagnosis(es): There were no encounter diagnoses. has a past medical history of Anxiety, Depression, Fatigue, GERD (gastroesophageal reflux disease), Goiter, Hiatal hernia, Hyperlipidemia, Hypertension, Insomnia, Osteopenia, and Rheumatic fever. has a past surgical history that includes Hysterectomy; Breast surgery; Tonsillectomy and adenoidectomy; Total hip arthroplasty; Thyroidectomy (2012); and Eye surgery. Restrictions  Restrictions/Precautions  Restrictions/Precautions: Fall Risk, General Precautions  Subjective   General  Chart Reviewed: Yes  Response To Previous Treatment: Patient with no complaints from previous session. Family / Caregiver Present: Yes  Subjective  Subjective: Patient reports feeling tired today. Daughter reports MD wants patient up walking today.   Pain Screening  Patient Currently in Pain: Denies  Vital Signs  Patient Currently in Pain: Denies       Objective   Bed mobility  Rolling to Right: Stand by assistance  Supine to Sit: Contact guard assistance  Sit to Supine: Contact guard assistance  Scooting: Contact guard assistance  Transfers  Sit to Stand: Minimal Assistance  Stand to sit: Contact guard assistance  Ambulation  Ambulation?: Yes  Ambulation 1  Surface: level tile  Device: Single point cane  Assistance: Minimal assistance  Distance: 30 feet x 2     Balance  Posture: Good  Sitting - Static: Good  Sitting - Dynamic: Good  Standing - Static: Fair;+  Standing - Dynamic: Fair  Exercises  Heelslides: 10  Hip Abduction: 10  Knee Long Arc Quad: 10  Ankle Pumps: 10      Goals  Short term goals  Time Frame for Short term goals: 3 days  Short term goal 1: Pt to ambulate 50 feet with sp cane x CGA  Short term goal 2: Pt to transfer bed to chair x SBA with sp cane. Short term goal 3: Pt to demonstrate good safety awareness with functional mobility. Plan    Plan  Times per week: 4-5 days per week  Plan weeks: 1  Current Treatment Recommendations: Strengthening, Endurance Training, Patient/Caregiver Education & Training, Equipment Evaluation, Education, & procurement, ROM, Balance Training, Gait Training, Safety Education & Training, Functional Mobility Training  Safety Devices  Type of devices: Left in bed, Call light within reach (daughter present)     Therapy Time   Individual Concurrent Group Co-treatment   Time In 5044         Time Out [phone]         Minutes 40              This note serves as D/C summary if patient is discharged prior to next visit.   Amelia Burch, PTA

## 2021-06-30 NOTE — PROGRESS NOTES
I did see and examine the patient with SHANAE Doss. The case was discussed with her. Please refer to her note for details. Patient was admitted with weakness, decreased oral intake and hyponatremia. Patient continued to be weak and nauseated. Rectal prolapse yesterday which was reduced. Sodium level improving with 3% saline. Continue to monitor closely and adjust fluids/rate based on readings. Patient declined proceeding with CT of the chest with oral contrast for further evaluation of her hiatal hernia/possible lesion in the lung. Try to gently diurese with her bilateral pleural effusion and shortness of breath. Discussed the importance of incentive spirometer and deep breathing. Monitor labs closely. PT/OT. Continue GI regimen. Will likely benefit from short-term rehab when more stable. Encourage oral intake and monitor nutrition status. Possible A. fib but likely sinus tachycardia with frequent PACs. Try to get blood pressure and heart rate under better control. Further recommendations/details per Tika's note.

## 2021-06-30 NOTE — PLAN OF CARE
Problem: SAFETY  Goal: Free from accidental physical injury  Outcome: Ongoing     Problem: DAILY CARE  Goal: Daily care needs are met  6/30/2021 0258 by Marah Valerio RN  Outcome: Ongoing  6/30/2021 0243 by Marah Valerio RN  Outcome: Ongoing     Problem: SKIN INTEGRITY  Goal: Skin integrity is maintained or improved  6/30/2021 0258 by Marah Valerio RN  Outcome: Ongoing  6/30/2021 0243 by Marah Valerio RN  Outcome: Ongoing

## 2021-06-30 NOTE — FLOWSHEET NOTE
06/30/21 0830   Assessment   Charting Type Shift assessment   Neurological   Neuro (WDL) WDL   Level of Consciousness Alert (0)   Swallow Screening   Is the patient able to remain alert for testing?  Yes   McQueeney Coma Scale   Eye Opening 4   Best Verbal Response 5   Best Motor Response 6   McQueeney Coma Scale Score 15   NIHSS Stroke Scale   NIHSS Stroke Scale Assessed No   HEENT   HEENT (WDL) WDL   Respiratory   Respiratory (WDL) X   Cardiac   Cardiac (WDL) WDL   Cardiac Rhythm PVC   Cardiac Monitor   Telemetry Monitor On Yes   Telemetry Audible Yes   Telemetry Alarms Set Yes   Telemetry Box Number [id number]   Telemetry Monitor Alarm Parameters    Gastrointestinal   Abdominal (WDL) X   Hernia Other (Comment)  (hiatal)   Abdomen Inspection Soft   Tenderness Nontender   RUQ Bowel Sounds Active   LUQ Bowel Sounds Active   RLQ Bowel Sounds Active   LLQ Bowel Sounds Active   Peripheral Vascular   Peripheral Vascular (WDL) WDL   Edema None   Skin Color/Condition   Skin Color/Condition (WDL) X   Skin Color Pale   Skin Integrity   Skin Integrity (WDL) WDL   Musculoskeletal   Musculoskeletal (WDL) X   RUE Weakness   LUE Weakness   RL Extremity Weakness   LL Extremity Weakness   Genitourinary   Genitourinary (WDL) WDL   Anus/Rectum   Anus/Rectum (WDL) X   Evaluation Other (Comment)  (prolaped at times)   Psychosocial   Psychosocial (WDL) X   Patient Behaviors Anxious

## 2021-07-01 ENCOUNTER — OUTSIDE FACILITY SERVICE (OUTPATIENT)
Dept: CARDIOLOGY | Facility: CLINIC | Age: 86
End: 2021-07-01

## 2021-07-01 PROBLEM — I48.0 PAF (PAROXYSMAL ATRIAL FIBRILLATION) (HCC): Status: ACTIVE | Noted: 2021-07-01

## 2021-07-01 LAB
A/G RATIO: 1.3 (ref 0.8–2)
ALBUMIN SERPL-MCNC: 3.2 G/DL (ref 3.4–4.8)
ALP BLD-CCNC: 66 U/L (ref 25–100)
ALT SERPL-CCNC: 16 U/L (ref 4–36)
ANION GAP SERPL CALCULATED.3IONS-SCNC: 5 MMOL/L (ref 3–16)
AST SERPL-CCNC: 20 U/L (ref 8–33)
BILIRUB SERPL-MCNC: <0.2 MG/DL (ref 0.3–1.2)
BUN BLDV-MCNC: 27 MG/DL (ref 6–20)
CALCIUM SERPL-MCNC: 9.7 MG/DL (ref 8.5–10.5)
CHLORIDE BLD-SCNC: 98 MMOL/L (ref 98–107)
CO2: 31 MMOL/L (ref 20–30)
CREAT SERPL-MCNC: 0.6 MG/DL (ref 0.4–1.2)
GFR AFRICAN AMERICAN: >59
GFR NON-AFRICAN AMERICAN: >60
GLOBULIN: 2.4 G/DL
GLUCOSE BLD-MCNC: 107 MG/DL (ref 74–106)
HCT VFR BLD CALC: 38.6 % (ref 37–47)
HEMOGLOBIN: 12 G/DL (ref 11.5–16.5)
MCH RBC QN AUTO: 30.4 PG (ref 27–32)
MCHC RBC AUTO-ENTMCNC: 31.1 G/DL (ref 31–35)
MCV RBC AUTO: 97.7 FL (ref 80–100)
PDW BLD-RTO: 12.8 % (ref 11–16)
PLATELET # BLD: 287 K/UL (ref 150–400)
PMV BLD AUTO: 9.8 FL (ref 6–10)
POTASSIUM REFLEX MAGNESIUM: 4 MMOL/L (ref 3.4–5.1)
RBC # BLD: 3.95 M/UL (ref 3.8–5.8)
SODIUM BLD-SCNC: 134 MMOL/L (ref 136–145)
TOTAL PROTEIN: 5.6 G/DL (ref 6.4–8.3)
WBC # BLD: 12 K/UL (ref 4–11)

## 2021-07-01 PROCEDURE — 80053 COMPREHEN METABOLIC PANEL: CPT

## 2021-07-01 PROCEDURE — 6370000000 HC RX 637 (ALT 250 FOR IP): Performed by: NURSE PRACTITIONER

## 2021-07-01 PROCEDURE — 6360000002 HC RX W HCPCS: Performed by: NURSE PRACTITIONER

## 2021-07-01 PROCEDURE — 6360000002 HC RX W HCPCS: Performed by: PHYSICIAN ASSISTANT

## 2021-07-01 PROCEDURE — 6370000000 HC RX 637 (ALT 250 FOR IP): Performed by: PHYSICIAN ASSISTANT

## 2021-07-01 PROCEDURE — 36415 COLL VENOUS BLD VENIPUNCTURE: CPT

## 2021-07-01 PROCEDURE — 97530 THERAPEUTIC ACTIVITIES: CPT

## 2021-07-01 PROCEDURE — 2700000000 HC OXYGEN THERAPY PER DAY

## 2021-07-01 PROCEDURE — 93308 TTE F-UP OR LMTD: CPT | Performed by: INTERNAL MEDICINE

## 2021-07-01 PROCEDURE — 97116 GAIT TRAINING THERAPY: CPT

## 2021-07-01 PROCEDURE — 6370000000 HC RX 637 (ALT 250 FOR IP): Performed by: INTERNAL MEDICINE

## 2021-07-01 PROCEDURE — 99232 SBSQ HOSP IP/OBS MODERATE 35: CPT | Performed by: INTERNAL MEDICINE

## 2021-07-01 PROCEDURE — 97535 SELF CARE MNGMENT TRAINING: CPT

## 2021-07-01 PROCEDURE — 85027 COMPLETE CBC AUTOMATED: CPT

## 2021-07-01 PROCEDURE — 1200000000 HC SEMI PRIVATE

## 2021-07-01 RX ORDER — METOPROLOL TARTRATE 50 MG/1
50 TABLET, FILM COATED ORAL 2 TIMES DAILY
Status: DISCONTINUED | OUTPATIENT
Start: 2021-07-02 | End: 2021-07-02

## 2021-07-01 RX ORDER — METHYLPREDNISOLONE SODIUM SUCCINATE 40 MG/ML
40 INJECTION, POWDER, LYOPHILIZED, FOR SOLUTION INTRAMUSCULAR; INTRAVENOUS DAILY
Status: DISCONTINUED | OUTPATIENT
Start: 2021-07-02 | End: 2021-07-02

## 2021-07-01 RX ORDER — SPIRONOLACTONE 25 MG/1
25 TABLET ORAL DAILY
Status: DISCONTINUED | OUTPATIENT
Start: 2021-07-02 | End: 2021-07-07 | Stop reason: HOSPADM

## 2021-07-01 RX ORDER — DILTIAZEM HYDROCHLORIDE 120 MG/1
120 CAPSULE, COATED, EXTENDED RELEASE ORAL DAILY
Status: DISCONTINUED | OUTPATIENT
Start: 2021-07-02 | End: 2021-07-07 | Stop reason: HOSPADM

## 2021-07-01 RX ADMIN — HYDRALAZINE HYDROCHLORIDE 25 MG: 25 TABLET, FILM COATED ORAL at 05:52

## 2021-07-01 RX ADMIN — Medication 2000 UNITS: at 09:19

## 2021-07-01 RX ADMIN — LINACLOTIDE 145 MCG: 145 CAPSULE, GELATIN COATED ORAL at 05:53

## 2021-07-01 RX ADMIN — METOPROLOL TARTRATE 25 MG: 25 TABLET, FILM COATED ORAL at 09:19

## 2021-07-01 RX ADMIN — HYDRALAZINE HYDROCHLORIDE 25 MG: 25 TABLET, FILM COATED ORAL at 15:09

## 2021-07-01 RX ADMIN — SUCRALFATE 1 G: 1 TABLET ORAL at 18:10

## 2021-07-01 RX ADMIN — DOCUSATE SODIUM 100 MG: 100 CAPSULE ORAL at 09:19

## 2021-07-01 RX ADMIN — SERTRALINE HYDROCHLORIDE 100 MG: 50 TABLET ORAL at 09:19

## 2021-07-01 RX ADMIN — POLYETHYLENE GLYCOL (3350) 17 G: 17 POWDER, FOR SOLUTION ORAL at 09:19

## 2021-07-01 RX ADMIN — CARVEDILOL 25 MG: 25 TABLET, FILM COATED ORAL at 09:19

## 2021-07-01 RX ADMIN — METOCLOPRAMIDE 5 MG: 5 INJECTION, SOLUTION INTRAMUSCULAR; INTRAVENOUS at 18:10

## 2021-07-01 RX ADMIN — PANTOPRAZOLE SODIUM 40 MG: 40 TABLET, DELAYED RELEASE ORAL at 05:52

## 2021-07-01 RX ADMIN — CARVEDILOL 25 MG: 25 TABLET, FILM COATED ORAL at 18:11

## 2021-07-01 RX ADMIN — SUCRALFATE 1 G: 1 TABLET ORAL at 05:53

## 2021-07-01 RX ADMIN — LOSARTAN POTASSIUM 50 MG: 50 TABLET, FILM COATED ORAL at 09:19

## 2021-07-01 RX ADMIN — METOCLOPRAMIDE 5 MG: 5 INJECTION, SOLUTION INTRAMUSCULAR; INTRAVENOUS at 13:10

## 2021-07-01 RX ADMIN — Medication 1 CAPSULE: at 09:19

## 2021-07-01 RX ADMIN — SUCRALFATE 1 G: 1 TABLET ORAL at 13:16

## 2021-07-01 RX ADMIN — ENOXAPARIN SODIUM 40 MG: 40 INJECTION SUBCUTANEOUS at 09:18

## 2021-07-01 RX ADMIN — METOCLOPRAMIDE 5 MG: 5 INJECTION, SOLUTION INTRAMUSCULAR; INTRAVENOUS at 05:52

## 2021-07-01 ASSESSMENT — PAIN SCALES - GENERAL: PAINLEVEL_OUTOF10: 0

## 2021-07-01 NOTE — PROGRESS NOTES
Occupational Therapy  Daily Note  Date: 2021   Patient Name: Altagracia Carvajal  MRN: [id number]     : 10/14/1930    Date of Service: 2021    Discharge Recommendations:  Continue to assess pending progress    Assessment  Performance deficits / Impairments: Decreased functional mobility ; Decreased balance;Decreased ADL status; Decreased endurance;Decreased high-level IADLs;Decreased strength  Activity Tolerance: Patient Tolerated treatment well     Pt seen for skilled OT interventions; no pain reported; daughter present. Pt reports admission due to rectal prolapse & generalized weakness. Pt states she previously performed all ADL tasks & cooked simple meals independently. Daughter is present currently within home for assist PRN but does not plan on living w pt. Pt displays signs of weakness & reports decreased nutritional intake. Pt able to perform bed mobility w Min A. Functional mobility tasks required Min A x2 using RW. Pt DEP & refused to attempt wiping & LB clothing management after toileting. OT provided family education about allowing pt to perform as independently as possible. Pt shows decline in functional status since initial evaluation. O2 stats >/= 90% throughout session wearing 2L O2. Pt is a good candidate to continue to benefit from skilled therapy services while IP to promote independence within daily tasks for safe return home alone. Pt left supine in bed, call bell in reach, head of bed elevated, daughter & son present. Patient Diagnosis(es): There were no encounter diagnoses. has a past medical history of Anxiety, Depression, Fatigue, GERD (gastroesophageal reflux disease), Goiter, Hiatal hernia, Hyperlipidemia, Hypertension, Insomnia, Osteopenia, and Rheumatic fever. has a past surgical history that includes Hysterectomy; Breast surgery; Tonsillectomy and adenoidectomy; Total hip arthroplasty; Thyroidectomy (2012); and Eye surgery.     Restrictions  Fall risk, cardiac    Subjective  Pain: None    Objective  Sitting Balance: Modified independent  Standing Balance: Minimum assistance x2  Functional Mobility: Minimum assistance x2 using rolling walker  Bed mobility: Minimum assistace  Transfers: Minimum assistance x2 using rolling walker    Toileting: Minimum assistance x2      Wiping: Dependent      LB Clothing Management: Dependent     Plan  Times per week: 3-5  Times per day: Daily  Plan weeks: 1  Current Treatment Recommendations: Balance Training, Self-Care / ADL, Patient/Caregiver Education & Training, Gait Training, Safety Education & Training, Strengthening, Functional Mobility Training, Home Management Training, Endurance Training    Goals  Short term goals  Time Frame for Short term goals: 1 week  Short term goal 1: Pt to demo HEP w Mod I  Short term goal 2: Pt to perform grooming standing at sink w/o LOB  Short term goal 3: Pt to perform dressing w Mod I  Short term goal 4: Pt to perform sponge bathing w Mod I  Short term goal 5: Pt to tolerate 10 minutes of activity    Therapy Time   Individual   Time In 135   Time Out 220   Minutes 39     Cher Wong, OTR/L  This note serves as D/C summary if patient is discharged prior to next visit.

## 2021-07-01 NOTE — FLOWSHEET NOTE
07/01/21 0936   Assessment   Charting Type Shift assessment   Neurological   Neuro (WDL) WDL   Level of Consciousness Alert (0)   Stefany Coma Scale   Eye Opening 4   Best Verbal Response 5   Best Motor Response 6   Stefany Coma Scale Score 15   HEENT   HEENT (WDL) WDL   Respiratory   Respiratory (WDL) WDL   Cough/Sputum   Sputum How Obtained None   Cardiac   Cardiac (WDL) X   Cardiac Regularity Irregular   Cardiac Rhythm Atrial fibrillation   Cardiac Monitor   Telemetry Monitor On Yes   Telemetry Audible Yes   Telemetry Alarms Set Yes   Telemetry Box Number [id number]   Telemetry Monitor Alarm Parameters MX40-12   Gastrointestinal   Abdominal (WDL) X   Hernia Other (Comment)  (hiatal)   Abdomen Inspection Soft   RUQ Bowel Sounds Active   LUQ Bowel Sounds Active   RLQ Bowel Sounds Active   LLQ Bowel Sounds Active   Peripheral Vascular   Peripheral Vascular (WDL) WDL   Skin Color/Condition   Skin Color/Condition (WDL) X   Skin Color Pale   Skin Condition/Temp Dry; Warm   Skin Integrity   Skin Integrity (WDL) WDL   Musculoskeletal   Musculoskeletal (WDL) X   RUE Weakness   LUE Weakness   RL Extremity Weakness   LL Extremity Weakness   Genitourinary   Genitourinary (WDL) WDL   Urine Assessment   Incontinence No   Urine Color Yellow/straw   Urine Appearance Clear   Anus/Rectum   Anus/Rectum (WDL) X   Evaluation Other (Comment)  (prolapsed at times)   Psychosocial   Psychosocial (WDL) X     Patient awake in bed, family at bedside. Pt took morning medications without any issue. Daughter at bedside, discussed code status with daughter and patient. Patient does verbally express wishes to be a DNR. Spoke with daughter about possible plan to change to swing bed as well as the plan is to get up in the chair and sit throughout the day to avoid laying and getting weak. Daughter verbalized understanding and stated \"yes, she has already been sitting up some this morning. \" Instructed to call out for assistance, voiced understanding.

## 2021-07-01 NOTE — PROGRESS NOTES
Progress Note      Subjective:   Chief complaint: Weakness, Poor oral intake, nausea, constipation    Interval History:   Resting in bed. Had CT chest yesetrday and results below and discussed with patient, daughter. ECHO completed and read by cardiology and report below. HR 90-120s, irregular,  IV lopressor PRN effective. Still decreased appetite, faitgue, weakness. Cooperating PT/OT. Denies bloody stools, further rectal prolapse, abdominal pain, SOB, CP, palpitations, vomiting. Having urine output. No further loose stools today. Episode hypoxia 88% yesterday after US and placed on 2L. NAD. See below for more details. Review of systems:     Constitutional:  Denies fever or chills. +fatigue, weakness  Eyes:  Denies change in visual acuity or discharge. HENT:  Denies nasal congestion or sore throat. Respiratory:  Denies cough or shortness of breath. Cardiovascular:  Denies chest pain, palpitation. +little swelling in LEs. GI:  Denies abdominal pain, vomiting, bloody stools. +nausea, decreased appetite, loose stools but improved  :  Denies dysuria or frequency. Musculoskeletal:  Denies acute back pain or joint pain. Integument:  Denies rash or itching. Neurologic:  Denies headache, focal weakness or sensory changes. Psychiatric:  Denies acute depression or anxiety. Past medical history, surgical history, family history and social history reviewed and unchanged compared to H&P earlier this admission.     Medications:   Scheduled Meds:   metoprolol tartrate  25 mg Oral BID    hydrALAZINE  25 mg Oral 3 times per day    metoclopramide  5 mg Intravenous Q6H    losartan  50 mg Oral BID    linaclotide  145 mcg Oral QAM AC    pantoprazole  40 mg Oral QAM AC    sucralfate  1 g Oral 4 times per day    sertraline  100 mg Oral Daily    enoxaparin  40 mg Subcutaneous Daily    polyethylene glycol  17 g Oral Daily    folic acid, thiamine, multi-vitamin with vitamin K infusion   Intravenous Once    reconstruction images      CT ABDOMEN PELVIS WO CONTRAST Additional Contrast? None   Final Result      Large cavitary mass seen at left lung base with dependent fluid. This is most suggestive of hiatus hernia with distended cecum but is poorly evaluated. Bilateral pleural effusions, left greater than right with patchy airspace disease also noted at the left lung base. Severe lumbar scoliosis. Otherwise no discrete evidence of acute abnormality or abnormal mass identified in the chest abdomen or pelvis. CT CHEST WO CONTRAST   Final Result      Large cavitary mass seen at left lung base with dependent fluid. This is most suggestive of hiatus hernia with distended cecum but is poorly evaluated. Bilateral pleural effusions, left greater than right with patchy airspace disease also noted at the left lung base. Severe lumbar scoliosis. Otherwise no discrete evidence of acute abnormality or abnormal mass identified in the chest abdomen or pelvis. CT HEAD WO CONTRAST   Final Result      No evidence of acute intracranial abnormality. XR CHEST PORTABLE   Final Result      No focal airspace disease. Increased exaggerated elevation of the left hemidiaphragm. ECHO 6/30:  Summary:    Afib noted.    Normal LV systolic function and wall motion. LVEF estimated at 55-60% Mild    concentric LVH. Grade 2 diastolic dysfunction.    The mitral valve leaflets appear normal. No prolapse or stenosis. Trace    mitral regurgitation.    The aortic valve is trileaflet with minimal sclerosis. There is no aortic    stenosis or insufficiency.    The tricuspid valve appears structurally normal. There is moderate tricuspid    regurgitation. RVSP est 58 mmHg. EKG: see in chart     Assessment and Plan: Active Hospital Problems    Diagnosis Date Noted    Gastroesophageal reflux disease without esophagitis [K21.9]  Cont current regimen. Added Carafate and tolerating. Avoid aggravating foods. 06/29/2021    Rectal prolapse [K62.3]  Occurred 2x past week then improved after constipation resolved. GI regimen. 06/29/2021    Hernia, hiatal [K44.9]  Large and present on previous CT scans per chart review. 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    -Continue supportive and symptomatic treatment. Treat empirically with Reglan and GI prophylaxis.     -CT chest completed and above. Dr Roth Plan discussed with family and no surgical interventions at this time. 06/29/2021    PAC (premature atrial contraction) [I49.1]  -On telemetry. EKG in chart. -120s with PACs. On coreg 25 BID. -IV lopressor and lasix PRN. Trace ankle edema. Monitor closely. -ECHO 6/30: Afib noted. Normal LV systolic function and wall motion. LVEF estimated at 55-60% Mild concentric LVH. Grade 2 diastolic dysfunction. RSVP 58. Full report above. -Dr Tere Sutherland consulted and seen patinet today. Note in chart.   -Labs in AM.    06/29/2021    Constipation [K59.00]  Improved. Continue regimen. 06/27/2021    Nausea [R11.0]  Intermittent. Cont regimens. 06/27/2021    Weakness [R53.1]  Manage all aspects of care from chronic to acute. Physical and occupational therapy following and working on improving overall strength and endurance. Dietician for adequate nutrition. Monitor lab work.   06/26/2021    Hyponatremia [E87.1]  Improved to 133 today. Hypertonic fluids discontinued. Labs in AM    06/26/2021    B12 deficiency [E53.8]  Stable. Cont regimen. 05/17/2016    Essential hypertension [I10]  -Improved. -130s. NAD, No CP. IV hydralazine PRN. Cont losartan, hydralazine and coreg.  Cardiology note discussed below for further plans.   - Cardiac diet. Labs in AM.    11/24/2015    Anxiety [F41.9]  Stable. Cont zoloft, xanax. 08/01/2014   · Pulmonary HTN  · AFIB (new onset)  -CHADSVASC = 4   -On telemetry. EKG in chart. -120s with PACs. On coreg 25 BID. -IV lopressor and lasix PRN. Trace ankle edema. Monitor closely. -ECHO 6/30: Afib noted. Normal LV systolic function and wall motion. LVEF estimated at 55-60% Mild concentric LVH. Grade 2 diastolic dysfunction. RSVP 58. Full report above. -Dr Maicol Fiarbanks consulted and seen patinet today. Note in chart. Recommends Cardizem, hydralazine, losartan, BB. Rate control. Discussed anticoagulants and patient considering options.   -With age, rectal prolapse, GI issues, falls, hesitant on starting anticoagulant. Cont lovenox at this time.   -Labs in AM.     Patient had episode oxygen saturations 88% when got back to room from [facility]. RT placed 2L on patient and stable at 93%. Overnight pulse ox completed and patient required 2L NC. Cont supplemental oxygen, wean as tolerated. Case management following for discharge planning. Patient was seen and examined by Dr. Sofia Guevara and plan of care reviewed.      Electronically signed by MARJORIE Barrett CNP on 7/1/2021 at 4:30 PM

## 2021-07-01 NOTE — PROGRESS NOTES
Overnight oximetry completed and test placed on MD's desk.     Patient study started on room air and was titrated to 2 lpm.

## 2021-07-01 NOTE — PROGRESS NOTES
Patient refused meds at this time, requested to take them when her daughter is present to admin, states she has to admin them to her in a certain way, meds left in package and placed in patients  med room

## 2021-07-01 NOTE — PROGRESS NOTES
Physical Therapy  Facility/Department: [facility] MED SURG  Daily Treatment Note  NAME: Amisha Baltazar  : 10/14/1930  MRN: [id number]    Date of Service: 2021    Discharge Recommendations:  Continue to assess pending progress      Assessment   Assessment: Cotreated with OT. Patient in recliner when therapists arrived. Daughter states patient needs to use the bathroom. Patient stood from recliner with Min A and was unsteady on her feet. Utilized RW instead of cane for gait. Patient only able to ambulate 10 feet before fatiguing. She sat down for a few minutes and then ambulated another 10 feet to the [facility]. Patient required assistance for hygiene and clothing management. Patient transferred back to bed with Min A.  Daughter had several questions concerning [facility] and potential LTC placement if necessary. Answered all questions to her satisfaction. REQUIRES PT FOLLOW UP: Yes  Activity Tolerance  Activity Tolerance: Patient limited by fatigue;Patient limited by endurance     Patient Diagnosis(es): There were no encounter diagnoses. has a past medical history of Anxiety, Depression, Fatigue, GERD (gastroesophageal reflux disease), Goiter, Hiatal hernia, Hyperlipidemia, Hypertension, Insomnia, Osteopenia, and Rheumatic fever. has a past surgical history that includes Hysterectomy; Breast surgery; Tonsillectomy and adenoidectomy; Total hip arthroplasty; Thyroidectomy (2012); and Eye surgery. Restrictions  Restrictions/Precautions  Restrictions/Precautions: Fall Risk, General Precautions  Subjective   General  Chart Reviewed: Yes  Response To Previous Treatment: Patient with no complaints from previous session. Family / Caregiver Present: Yes  Subjective  Subjective: Patient reports feeling very tired and requested to get back in bed.   Pain Screening  Patient Currently in Pain: Denies  Vital Signs  Patient Currently in Pain: Denies       Objective      Transfers  Sit to Stand: Minimal Assistance  Stand to sit: Contact guard assistance  Ambulation  Ambulation?: Yes  Ambulation 1  Surface: level tile  Device: Rolling Walker  Other Apparatus: O2  Assistance: Minimal assistance  Gait Deviations: Slow Kary;Decreased step length;Decreased step height  Distance: 10 feet x 2     Balance  Posture: Good  Sitting - Static: Good  Sitting - Dynamic: Good      Goals  Short term goals  Time Frame for Short term goals: 3 days  Short term goal 1: Pt to ambulate 50 feet with sp cane x CGA  Short term goal 2: Pt to transfer bed to chair x SBA with sp cane. Short term goal 3: Pt to demonstrate good safety awareness with functional mobility. Plan    Plan  Times per week: 4-5 days per week  Plan weeks: 1  Current Treatment Recommendations: Strengthening, Endurance Training, Patient/Caregiver Education & Training, Equipment Evaluation, Education, & procurement, ROM, Balance Training, Gait Training, Safety Education & Training, Functional Mobility Training  Safety Devices  Type of devices: Left in bed, Call light within reach (daughter present)     Therapy Time   Individual Concurrent Group Co-treatment   Time In [phone]         Time Out [phone]         Minutes 45              This note serves as D/C summary if patient is discharged prior to next visit.   Florence Clancy, PTA

## 2021-07-01 NOTE — PROGRESS NOTES
Called to M/S. Pt consistently 86-88 per Sarbjit Hay RN. SpO2 88% per overnight oximetry. Patient placed on 2 lpm and increased to 93%.     Will continue to monitor

## 2021-07-01 NOTE — FLOWSHEET NOTE
07/01/21 0039   Assessment   Charting Type Shift assessment   Neurological   Neuro (WDL) WDL   Level of Consciousness Alert (0)   Swallow Screening   Is the patient able to remain alert for testing? Yes   Was the Patient Eating a Modified Diet Prior to being Admitted?  No   [place] Coma Scale   Eye Opening 4   Best Verbal Response 5   Best Motor Response 6   [place] Coma Scale Score 15   NIHSS Stroke Scale   NIHSS Stroke Scale Assessed No   HEENT   HEENT (WDL) WDL   Respiratory   Respiratory (WDL) WDL   Cough/Sputum   Sputum How Obtained None   Cardiac   Cardiac (WDL) X   Cardiac Regularity Irregular   Cardiac Rhythm Atrial fibrillation   Rhythm Interpretation   Pulse 85   Cardiac Monitor   Telemetry Monitor On Yes   Telemetry Audible Yes   Telemetry Alarms Set Yes   Telemetry Box Number [id number]   Telemetry Monitor Alarm Parameters MX40-12   Gastrointestinal   Abdominal (WDL) X   Hernia Other (Comment)  (hiatial)   Abdomen Inspection Soft   Last BM (including prior to admit) 06/28/21   RUQ Bowel Sounds Active   LUQ Bowel Sounds Active   RLQ Bowel Sounds Active   LLQ Bowel Sounds Active   Peripheral Vascular   Peripheral Vascular (WDL) WDL   Skin Color/Condition   Skin Color/Condition (WDL) X   Skin Color Pale   Skin Condition/Temp Warm;Dry   Skin Integrity   Skin Integrity (WDL) WDL   Musculoskeletal   Musculoskeletal (WDL) X   RUE Weakness   LUE Weakness   RL Extremity Weakness   LL Extremity Weakness   Genitourinary   Genitourinary (WDL) WDL   Urine Assessment   Incontinence No   Urine Color Yellow/straw   Urine Appearance Clear   Anus/Rectum   Anus/Rectum (WDL) X   Evaluation Other (Comment)  (prolapsed at times)   Psychosocial   Psychosocial (WDL) X   Patient Behaviors Anxious   Needs Expressed Physical

## 2021-07-01 NOTE — CONSULTS
Cardiology has been consulted for discussion regarding management of her atrial fibrillation. She reports a remote history of gastritis and GI bleed secondary to NSAID use. She has a remote history of subarachnoid hemorrhage secondary to fall. She has fallen 2-3 times in the last 12 months. Review of Systems  Pertinent positives as listed in the HPI. All other systems reviewed are negative. Objective    Physical exam:  General: Well-developed, well-nourished, elderly female in no acute distress  HEENT: Normocephalic, atraumatic. Mucous membranes are pink and moist.  No scleral icterus. Nares patent. Cardiovascular: Irregular irregular rhythm, regular rate. No rubs, murmurs or gallops. Pedal pulses are 2+ bilaterally. Femoral arteries 2+ without bruit. No carotid bruits noted  Respiratory: Clear to auscultation. Nonlabored  Abdomen: Soft, nondistended, nontender. Positive bowel sounds  Neuro: Awake alert oriented x3. No gross deficits  Musculoskeletal: No gross deficits with full range of motion. Psych: Mood and affect congruent and appropriate to situation. Vital Sign Min/Max - last 24 hours  Vitals:    07/01/21 0001 07/01/21 0039 07/01/21 0606 07/01/21 0943   BP:   127/71 128/73   Pulse:  85 94 92   Resp:   18 18   Temp:   96.8 °F (36 °C) 97.8 °F (36.6 °C)   TempSrc:   Temporal Temporal   SpO2: 90%  97% 96%   Weight:       Height:           Labs:   Reviewed recent labs. Results Review:  I reviewed the patient's new clinical results. ASSESSMENT AND PLAN  1. New onset atrial fibrillation  - [id number] = 4  HAS-BLED = 2  - Lengthy discussion with patient and her daughter regarding the risk benefits of anticoagulation, watchman device versus aspirin therapy only. Patient would like to consider her options at this time. - Discontinue Coreg. Start diltiazem. Continue metoprolol and uptitrate as needed for rate control    2.   Hypertension  - Continue hydralazine, losartan and metoprolol. Adding Cardizem for # 1.     3.  Pulmonary hypertension  - Will be starting Cardizem for # 1. I discussed the patients findings and my recommendations with patient.     Lillian Hoover MD  07/01/21  1:20 PM

## 2021-07-02 ENCOUNTER — APPOINTMENT (OUTPATIENT)
Dept: GENERAL RADIOLOGY | Facility: HOSPITAL | Age: 86
DRG: 643 | End: 2021-07-02
Attending: INTERNAL MEDICINE
Payer: MEDICARE

## 2021-07-02 LAB
ANION GAP SERPL CALCULATED.3IONS-SCNC: 3 MMOL/L (ref 3–16)
BACTERIA: ABNORMAL /HPF
BILIRUBIN URINE: NEGATIVE
BLOOD, URINE: NEGATIVE
BUN BLDV-MCNC: 37 MG/DL (ref 6–20)
CALCIUM SERPL-MCNC: 9.6 MG/DL (ref 8.5–10.5)
CHLORIDE BLD-SCNC: 93 MMOL/L (ref 98–107)
CLARITY: CLEAR
CO2: 31 MMOL/L (ref 20–30)
COLOR: YELLOW
CREAT SERPL-MCNC: 0.6 MG/DL (ref 0.4–1.2)
EKG ATRIAL RATE: 79 BPM
EKG DIAGNOSIS: NORMAL
EKG P AXIS: 77 DEGREES
EKG P-R INTERVAL: 142 MS
EKG Q-T INTERVAL: 346 MS
EKG QRS DURATION: 68 MS
EKG QTC CALCULATION (BAZETT): 396 MS
EKG R AXIS: 63 DEGREES
EKG T AXIS: 62 DEGREES
EKG VENTRICULAR RATE: 79 BPM
GFR AFRICAN AMERICAN: >59
GFR NON-AFRICAN AMERICAN: >60
GLUCOSE BLD-MCNC: 116 MG/DL (ref 74–106)
GLUCOSE URINE: NEGATIVE MG/DL
HCT VFR BLD CALC: 37.1 % (ref 37–47)
HEMOGLOBIN: 11.4 G/DL (ref 11.5–16.5)
KETONES, URINE: NEGATIVE MG/DL
LEUKOCYTE ESTERASE, URINE: ABNORMAL
MCH RBC QN AUTO: 30.7 PG (ref 27–32)
MCHC RBC AUTO-ENTMCNC: 30.7 G/DL (ref 31–35)
MCV RBC AUTO: 100 FL (ref 80–100)
MICROSCOPIC EXAMINATION: YES
MUCUS: ABNORMAL /LPF
NITRITE, URINE: NEGATIVE
PDW BLD-RTO: 13 % (ref 11–16)
PH UA: 5.5 (ref 5–8)
PLATELET # BLD: 267 K/UL (ref 150–400)
PMV BLD AUTO: 9.5 FL (ref 6–10)
POTASSIUM SERPL-SCNC: 4.6 MMOL/L (ref 3.4–5.1)
PROTEIN UA: 30 MG/DL
RBC # BLD: 3.71 M/UL (ref 3.8–5.8)
RBC UA: ABNORMAL /HPF (ref 0–4)
SODIUM BLD-SCNC: 127 MMOL/L (ref 136–145)
SPECIFIC GRAVITY UA: 1.02 (ref 1–1.03)
TROPONIN: <0.3 NG/ML
URINE REFLEX TO CULTURE: YES
URINE TYPE: ABNORMAL
UROBILINOGEN, URINE: 0.2 E.U./DL
WBC # BLD: 10.3 K/UL (ref 4–11)
WBC UA: ABNORMAL /HPF (ref 0–5)

## 2021-07-02 PROCEDURE — 97530 THERAPEUTIC ACTIVITIES: CPT

## 2021-07-02 PROCEDURE — 2700000000 HC OXYGEN THERAPY PER DAY

## 2021-07-02 PROCEDURE — 97535 SELF CARE MNGMENT TRAINING: CPT

## 2021-07-02 PROCEDURE — 6360000002 HC RX W HCPCS: Performed by: PHYSICIAN ASSISTANT

## 2021-07-02 PROCEDURE — 84484 ASSAY OF TROPONIN QUANT: CPT

## 2021-07-02 PROCEDURE — 6370000000 HC RX 637 (ALT 250 FOR IP): Performed by: NURSE PRACTITIONER

## 2021-07-02 PROCEDURE — 51798 US URINE CAPACITY MEASURE: CPT

## 2021-07-02 PROCEDURE — 80048 BASIC METABOLIC PNL TOTAL CA: CPT

## 2021-07-02 PROCEDURE — 71045 X-RAY EXAM CHEST 1 VIEW: CPT

## 2021-07-02 PROCEDURE — 87086 URINE CULTURE/COLONY COUNT: CPT

## 2021-07-02 PROCEDURE — 6370000000 HC RX 637 (ALT 250 FOR IP): Performed by: INTERNAL MEDICINE

## 2021-07-02 PROCEDURE — 2580000003 HC RX 258: Performed by: INTERNAL MEDICINE

## 2021-07-02 PROCEDURE — 81001 URINALYSIS AUTO W/SCOPE: CPT

## 2021-07-02 PROCEDURE — 6360000002 HC RX W HCPCS: Performed by: INTERNAL MEDICINE

## 2021-07-02 PROCEDURE — 6360000002 HC RX W HCPCS: Performed by: NURSE PRACTITIONER

## 2021-07-02 PROCEDURE — 93005 ELECTROCARDIOGRAM TRACING: CPT

## 2021-07-02 PROCEDURE — 85027 COMPLETE CBC AUTOMATED: CPT

## 2021-07-02 PROCEDURE — 99232 SBSQ HOSP IP/OBS MODERATE 35: CPT | Performed by: INTERNAL MEDICINE

## 2021-07-02 PROCEDURE — 6370000000 HC RX 637 (ALT 250 FOR IP): Performed by: PHYSICIAN ASSISTANT

## 2021-07-02 PROCEDURE — 36415 COLL VENOUS BLD VENIPUNCTURE: CPT

## 2021-07-02 PROCEDURE — 1200000000 HC SEMI PRIVATE

## 2021-07-02 RX ORDER — NITROGLYCERIN 0.4 MG/1
0.4 TABLET SUBLINGUAL EVERY 5 MIN PRN
Status: COMPLETED | OUTPATIENT
Start: 2021-07-02 | End: 2021-07-02

## 2021-07-02 RX ORDER — MULTIVIT-MIN/FERROUS GLUCONATE 9 MG/15 ML
15 LIQUID (ML) ORAL DAILY
Status: DISCONTINUED | OUTPATIENT
Start: 2021-07-02 | End: 2021-07-07 | Stop reason: HOSPADM

## 2021-07-02 RX ORDER — FUROSEMIDE 20 MG/1
20 TABLET ORAL ONCE
Status: DISCONTINUED | OUTPATIENT
Start: 2021-07-02 | End: 2021-07-07 | Stop reason: HOSPADM

## 2021-07-02 RX ORDER — SODIUM CHLORIDE 9 MG/ML
INJECTION, SOLUTION INTRAVENOUS ONCE
Status: COMPLETED | OUTPATIENT
Start: 2021-07-02 | End: 2021-07-03

## 2021-07-02 RX ADMIN — POLYETHYLENE GLYCOL (3350) 17 G: 17 POWDER, FOR SOLUTION ORAL at 09:01

## 2021-07-02 RX ADMIN — Medication 15 ML: at 11:17

## 2021-07-02 RX ADMIN — HYDRALAZINE HYDROCHLORIDE 25 MG: 25 TABLET, FILM COATED ORAL at 21:18

## 2021-07-02 RX ADMIN — ENOXAPARIN SODIUM 40 MG: 40 INJECTION SUBCUTANEOUS at 09:00

## 2021-07-02 RX ADMIN — PANTOPRAZOLE SODIUM 40 MG: 40 TABLET, DELAYED RELEASE ORAL at 09:01

## 2021-07-02 RX ADMIN — SERTRALINE HYDROCHLORIDE 100 MG: 50 TABLET ORAL at 09:01

## 2021-07-02 RX ADMIN — Medication 0.4 MG: at 07:19

## 2021-07-02 RX ADMIN — Medication 1 CAPSULE: at 09:02

## 2021-07-02 RX ADMIN — ESTRADIOL 1 G: 0.1 CREAM VAGINAL at 09:02

## 2021-07-02 RX ADMIN — SUCRALFATE 1 G: 1 TABLET ORAL at 17:09

## 2021-07-02 RX ADMIN — METOPROLOL TARTRATE 25 MG: 25 TABLET, FILM COATED ORAL at 21:18

## 2021-07-02 RX ADMIN — SODIUM CHLORIDE: 9 INJECTION, SOLUTION INTRAVENOUS at 08:51

## 2021-07-02 RX ADMIN — SUCRALFATE 1 G: 1 TABLET ORAL at 11:17

## 2021-07-02 RX ADMIN — SPIRONOLACTONE 25 MG: 25 TABLET, FILM COATED ORAL at 09:01

## 2021-07-02 RX ADMIN — METHYLPREDNISOLONE SODIUM SUCCINATE 40 MG: 40 INJECTION, POWDER, FOR SOLUTION INTRAMUSCULAR; INTRAVENOUS at 05:56

## 2021-07-02 RX ADMIN — LOSARTAN POTASSIUM 50 MG: 50 TABLET, FILM COATED ORAL at 21:17

## 2021-07-02 RX ADMIN — SUCRALFATE 1 G: 1 TABLET ORAL at 09:06

## 2021-07-02 RX ADMIN — METOCLOPRAMIDE 5 MG: 5 INJECTION, SOLUTION INTRAMUSCULAR; INTRAVENOUS at 17:09

## 2021-07-02 RX ADMIN — METOPROLOL TARTRATE 25 MG: 25 TABLET, FILM COATED ORAL at 09:01

## 2021-07-02 RX ADMIN — DILTIAZEM HYDROCHLORIDE 120 MG: 120 CAPSULE, COATED, EXTENDED RELEASE ORAL at 09:02

## 2021-07-02 RX ADMIN — METOCLOPRAMIDE 5 MG: 5 INJECTION, SOLUTION INTRAMUSCULAR; INTRAVENOUS at 11:17

## 2021-07-02 RX ADMIN — METOCLOPRAMIDE 5 MG: 5 INJECTION, SOLUTION INTRAMUSCULAR; INTRAVENOUS at 05:56

## 2021-07-02 RX ADMIN — Medication 2000 UNITS: at 09:00

## 2021-07-02 RX ADMIN — DOCUSATE SODIUM 100 MG: 100 CAPSULE ORAL at 09:01

## 2021-07-02 ASSESSMENT — PAIN SCALES - GENERAL
PAINLEVEL_OUTOF10: 0

## 2021-07-02 NOTE — FLOWSHEET NOTE
07/02/21 0910   Vital Signs   Temp 97.7 °F (36.5 °C)   Temp Source Temporal   Pulse 84   Heart Rate Source Monitor   Resp 20   /80   BP Location Right upper arm   MAP (mmHg) 85   Level of Consciousness Alert (0)   MEWS Score 1   Oxygen Therapy   SpO2 99 %   O2 Device Nasal cannula   O2 Flow Rate (L/min) 3 L/min

## 2021-07-02 NOTE — PROGRESS NOTES
Patient/Caregiver Education & Training, Gait Training, Safety Education & Training, Strengthening, Functional Mobility Training, Home Management Training, Endurance Training    Goals  Short term goals  Time Frame for Short term goals: 1 week  Short term goal 1: Pt to demo HEP w Mod I  Short term goal 2: Pt to perform grooming standing at sink w/o LOB  Short term goal 3: Pt to perform dressing w Mod I  Short term goal 4: Pt to perform sponge bathing w Mod I  Short term goal 5: Pt to tolerate 10 minutes of activity    Therapy Time   Individual   Time In    Time Out    Minutes        Carina Yoder OTR/L  This note serves as D/C summary if patient is discharged prior to next visit.

## 2021-07-02 NOTE — PROGRESS NOTES
Pt. on room  Air got up to [facility]- noted oxygen dropped to 78-80% 2 liters nasal cannula noted 98% will continue to monitor

## 2021-07-02 NOTE — PROGRESS NOTES
Occupational Therapy  Daily Note  Date: 2021   Patient Name: Kelly Marrufo  MRN: [id number]     : 10/14/1930    Date of Service: 2021    Discharge Recommendations:  Continue to assess pending progress    Assessment  Performance deficits / Impairments: Decreased functional mobility ; Decreased balance;Decreased ADL status; Decreased endurance;Decreased high-level IADLs;Decreased strength  Activity Tolerance: Patient limited by fatigue;Patient limited by endurance      Pt seen for skilled OT interventions; no pain reported; daughter present. Pt reports admission due to rectal prolapse & generalized weakness. Pt states she previously performed all ADL tasks & cooked simple meals independently. Daughter is present currently within home for assist PRN but does not plan on living w pt. Pt displays signs of weakness & reports decreased nutritional intake. Pt able to perform bed mobility w CGA. Functional mobility tasks required Min A x2 using RW. Pt DEP & refused to attempt wiping & LB clothing management after toileting. Pt shows decline in functional status since initial evaluation, improvements in functional abilities & motivation to participate seems to have improved since yesterday. O2 stats >/= 90% throughout session wearing 3L O2. Pt is a good candidate to continue to benefit from skilled therapy services while IP to promote independence within daily tasks for safe return home alone. Pt left supine in bed, call bell in reach, head of bed elevated, daughter & son present. Patient Diagnosis(es): There were no encounter diagnoses. has a past medical history of Anxiety, Depression, Fatigue, GERD (gastroesophageal reflux disease), Goiter, Hiatal hernia, Hyperlipidemia, Hypertension, Insomnia, Orthopnea, Osteopenia, and Rheumatic fever. has a past surgical history that includes Hysterectomy; Breast surgery; Tonsillectomy and adenoidectomy; Total hip arthroplasty;  Thyroidectomy (2012); and Eye surgery. Restrictions  Fall risk, cardiac    Subjective  Pain: None    Objective  Bed mobility: Contact guard assistance  Sitting Balance: Good  Standing Balance: Fair  Functional Mobility: Minimum assistance x2 using rolling walker  Transfers: Minimum assistance x2 using rolling walker    Toileting: Minimum assistance x2      Wiping: Dependent      LB Clothing Management: Dependent     Plan  Times per week: 3-5  Times per day: Daily  Plan weeks: 1  Current Treatment Recommendations: Balance Training, Self-Care / ADL, Patient/Caregiver Education & Training, Gait Training, Safety Education & Training, Strengthening, Functional Mobility Training, Home Management Training, Endurance Training    Goals  Short term goals  Time Frame for Short term goals: 1 week  Short term goal 1: Pt to demo HEP w Mod I  Short term goal 2: Pt to perform grooming standing at sink w/o LOB  Short term goal 3: Pt to perform dressing w Mod I  Short term goal 4: Pt to perform sponge bathing w Mod I  Short term goal 5: Pt to tolerate 10 minutes of activity    Therapy Time   Co-Treat   Time In 1100   Time Out 1125   Minutes 25     Cher Wong OTR/L  This note serves as D/C summary if patient is discharged prior to next visit.

## 2021-07-02 NOTE — PROGRESS NOTES
Physical Therapy  Facility/Department: [facility] MED SURG  Daily Treatment Note  NAME: Henrietta Mas  : 10/14/1930  MRN: [id number]    Date of Service: 2021    Discharge Recommendations:  Continue to assess pending progress      Assessment   Assessment: Cotreated with OT. Patient completed bed mobility with CGA. Stood with Min A and transferred to the [facility] with CGA of 2 for assistance with managing IV pole and O2. Patient reports she does not complete toileting hygiene and that her daughter does it. She declined to wipe herself and required assistance with pulling up brief. Patient transferred from the [facility] to the recliner. REQUIRES PT FOLLOW UP: Yes  Activity Tolerance  Activity Tolerance: Patient limited by fatigue;Patient limited by endurance     Patient Diagnosis(es): There were no encounter diagnoses. has a past medical history of Anxiety, Depression, Fatigue, GERD (gastroesophageal reflux disease), Goiter, Hiatal hernia, Hyperlipidemia, Hypertension, Insomnia, Orthopnea, Osteopenia, and Rheumatic fever. has a past surgical history that includes Hysterectomy; Breast surgery; Tonsillectomy and adenoidectomy; Total hip arthroplasty; Thyroidectomy (2012); and Eye surgery. Restrictions  Restrictions/Precautions  Restrictions/Precautions: Fall Risk, General Precautions  Subjective   General  Chart Reviewed: Yes  Response To Previous Treatment: Patient with no complaints from previous session. Family / Caregiver Present: Yes  Subjective  Subjective: Patient offers no new c/o.   Agreeable to get up to the chair  Pain Screening  Patient Currently in Pain: Denies  Vital Signs  Patient Currently in Pain: Denies       Objective   Bed mobility  Rolling to Right: Contact guard assistance  Supine to Sit: Contact guard assistance  Scooting: Contact guard assistance  Transfers  Sit to Stand: Minimal Assistance  Stand to sit: Contact guard assistance  Bed to Chair: Contact guard assistance;2 Person Assistance  Comment: Bed to [facility] transfer with RW and CGA of 2  Ambulation  Ambulation?: No     Balance  Posture: Good  Sitting - Static: Good  Sitting - Dynamic: Good  Standing - Static: Fair;+  Standing - Dynamic: Fair      Goals  Short term goals  Time Frame for Short term goals: 3 days  Short term goal 1: Pt to ambulate 50 feet with sp cane x CGA  Short term goal 2: Pt to transfer bed to chair x SBA with sp cane. Short term goal 3: Pt to demonstrate good safety awareness with functional mobility. Plan    Plan  Times per week: 4-5 days per week  Plan weeks: 1  Current Treatment Recommendations: Strengthening, Endurance Training, Patient/Caregiver Education & Training, Equipment Evaluation, Education, & procurement, ROM, Balance Training, Gait Training, Safety Education & Training, Functional Mobility Training  Safety Devices  Type of devices: Left in chair, Call light within reach (son and daughter present)     Therapy Time   Individual Concurrent Group Co-treatment   Time In 1100         Time Out 1125         Minutes 25              This note serves as D/C summary if patient is discharged prior to next visit.   Geraldine Zaragoza, PTA

## 2021-07-02 NOTE — CARE COORDINATION
Pt O2 sats dropped to 88% on RA at rest today. Placed on [facility] per nursing and sats rebounded to 92-93%. Pt also requires 2LNC per overnight pulse (see soft chart).

## 2021-07-02 NOTE — CARE COORDINATION
The Plan for Transition of Care is related to the following treatment goals: home with [facility] and family assist 24/7    The Patient and/or patient representative was provided with a choice of provider and agrees with the discharge plan. [x] Yes [] No    Freedom of choice list was provided with basic dialogue that supports the patient's individualized plan of care/goals, treatment preferences and shares the quality data associated with the providers. [x] Yes [] No    Pt's daughter is requesting home with 24/7 care in AM.  [facility] was first option, but not covered by insurance. Orders for [facility] sent to [facility] per request of family. All DME orders sent to [facility] - they are in network and can deliver DME today (family had no preference other than in network and deliverable today). No further DC needs noted at this time.   Will continue to monitor - plan to DC to home in AM

## 2021-07-02 NOTE — FLOWSHEET NOTE
07/02/21 0915   Assessment   Charting Type Shift assessment   Neurological   Neuro (WDL) WDL   Level of Consciousness Alert (0)   Swallow Screening   Is the patient able to remain alert for testing? Yes   Was the Patient Eating a Modified Diet Prior to being Admitted? No   Stefany Coma Scale   Eye Opening 4   Best Verbal Response 5   Best Motor Response 6   Derry Coma Scale Score 15   NIHSS Stroke Scale   NIHSS Stroke Scale Assessed No   HEENT   HEENT (WDL) WDL   Respiratory   Respiratory (WDL) WDL   Breath Sounds   Right Upper Lobe Clear   Right Middle Lobe Clear   Right Lower Lobe Diminished   Left Upper Lobe Clear   Left Lower Lobe Diminished   Cough/Sputum   Sputum How Obtained None   Cardiac   Cardiac (WDL) X   Cardiac Rhythm NSR  (frequent SVPVC's HX afib)   Cardiac Monitor   Telemetry Monitor On Yes   Telemetry Audible Yes   Telemetry Alarms Set Yes   Telemetry Box Number [id number]   Gastrointestinal   Abdominal (WDL) X   Hernia Other (Comment)  (hiatal)   Abdomen Inspection Soft   Tenderness Nontender   RUQ Bowel Sounds Active   LUQ Bowel Sounds Active   RLQ Bowel Sounds Active   LLQ Bowel Sounds Active   Peripheral Vascular   Peripheral Vascular (WDL) WDL   Edema Right upper extremity; Left upper extremity;Right lower extremity; Left lower extremity;Periorbital   RUE Edema Trace   LUE Edema Trace   RLE Edema +1;Pitting   LLE Edema +1;Pitting   Skin Color/Condition   Skin Color/Condition (WDL) X   Skin Color Pale   Skin Condition/Temp Dry; Warm   Skin Integrity   Skin Integrity (WDL) WDL   Musculoskeletal   Musculoskeletal (WDL) X   RUE Weakness   LUE Weakness   RL Extremity Weakness   LL Extremity Weakness   Genitourinary   Genitourinary (WDL) WDL   Urine Assessment   Incontinence No   Urine Color Yellow/straw   Urine Appearance Clear   Anus/Rectum   Anus/Rectum (WDL) X   Evaluation Other (Comment)  (prolapsed at times)   Psychosocial   Psychosocial (WDL) X   Patient Behaviors Anxious   Needs Expressed Physical   Pt. Alert times 4- Pt assisted to bedside commode this am- Bladder scan per order- No complaints of pain-Pt.  Told to call out with any needs-Daughter at bedside- Will monitor

## 2021-07-02 NOTE — FLOWSHEET NOTE
07/01/21 2102   Assessment   Charting Type Shift assessment   Neurological   Neuro (WDL) WDL   Level of Consciousness Alert (0)   Stefany Coma Scale   Eye Opening 4   Best Verbal Response 5   Best Motor Response 6   Stefany Coma Scale Score 15   HEENT   HEENT (WDL) WDL   Respiratory   Respiratory (WDL) WDL   Cough/Sputum   Sputum How Obtained None   Cardiac   Cardiac (WDL) X   Cardiac Regularity Irregular   Cardiac Rhythm Atrial fibrillation   Cardiac Monitor   Telemetry Monitor On Yes   Telemetry Audible Yes   Telemetry Alarms Set Yes   Telemetry Box Number [id number]   Telemetry Monitor Alarm Parameters [id number]   Gastrointestinal   Abdominal (WDL) X   Hernia Other (Comment)  (hiatal)   Abdomen Inspection Soft   Tenderness Nontender   RUQ Bowel Sounds Active   LUQ Bowel Sounds Active   RLQ Bowel Sounds Active   LLQ Bowel Sounds Active   Peripheral Vascular   Peripheral Vascular (WDL) WDL   Edema Right lower extremity; Left lower extremity   RLE Edema +1;Pitting   LLE Edema +1;Pitting   Skin Color/Condition   Skin Color/Condition (WDL) X   Skin Color Pale   Skin Condition/Temp Dry; Warm   Skin Integrity   Skin Integrity (WDL) WDL   Musculoskeletal   Musculoskeletal (WDL) X   RUE Weakness   LUE Weakness   RL Extremity Weakness   LL Extremity Weakness   Genitourinary   Genitourinary (WDL) WDL   Urine Assessment   Incontinence No   Urine Color Yellow/straw   Urine Appearance Clear   Anus/Rectum   Anus/Rectum (WDL) X   Evaluation Other (Comment)  (prolapsed at times)   Psychosocial   Psychosocial (WDL) X     Spoke with pt and daughter, she had concern about giving all the BP meds. We decided to hold her bp meds. We will recheck BP at next rounds. Pt got upto bedside with minimal assistance, she did have two people help her . She turned well, just slow to keep from falling. She was not very talkative, was not opinionated be any means. Daughter is afraid BP is causing her different mental state.  She does have 1+ pitting edema that has not been noted in her chart.

## 2021-07-03 ENCOUNTER — APPOINTMENT (OUTPATIENT)
Dept: GENERAL RADIOLOGY | Facility: HOSPITAL | Age: 86
DRG: 643 | End: 2021-07-03
Attending: INTERNAL MEDICINE
Payer: MEDICARE

## 2021-07-03 LAB
A/G RATIO: 1.4 (ref 0.8–2)
ALBUMIN SERPL-MCNC: 3.3 G/DL (ref 3.4–4.8)
ALP BLD-CCNC: 68 U/L (ref 25–100)
ALT SERPL-CCNC: 65 U/L (ref 4–36)
ANION GAP SERPL CALCULATED.3IONS-SCNC: 2 MMOL/L (ref 3–16)
AST SERPL-CCNC: 40 U/L (ref 8–33)
BASOPHILS ABSOLUTE: 0 K/UL (ref 0–0.1)
BASOPHILS RELATIVE PERCENT: 0.1 %
BILIRUB SERPL-MCNC: <0.2 MG/DL (ref 0.3–1.2)
BUN BLDV-MCNC: 29 MG/DL (ref 6–20)
CALCIUM SERPL-MCNC: 10 MG/DL (ref 8.5–10.5)
CHLORIDE BLD-SCNC: 95 MMOL/L (ref 98–107)
CO2: 35 MMOL/L (ref 20–30)
CREAT SERPL-MCNC: <0.5 MG/DL (ref 0.4–1.2)
D DIMER: 0.3 UG/ML FEU (ref 0–0.6)
EOSINOPHILS ABSOLUTE: 0 K/UL (ref 0–0.4)
EOSINOPHILS RELATIVE PERCENT: 0.1 %
GFR AFRICAN AMERICAN: >59
GFR NON-AFRICAN AMERICAN: >60
GLOBULIN: 2.3 G/DL
GLUCOSE BLD-MCNC: 150 MG/DL (ref 74–106)
HCT VFR BLD CALC: 39.2 % (ref 37–47)
HEMOGLOBIN: 12.1 G/DL (ref 11.5–16.5)
IMMATURE GRANULOCYTES #: 0.1 K/UL
IMMATURE GRANULOCYTES %: 0.7 % (ref 0–5)
LYMPHOCYTES ABSOLUTE: 0.8 K/UL (ref 1.5–4)
LYMPHOCYTES RELATIVE PERCENT: 5.8 %
MCH RBC QN AUTO: 31.2 PG (ref 27–32)
MCHC RBC AUTO-ENTMCNC: 30.9 G/DL (ref 31–35)
MCV RBC AUTO: 101 FL (ref 80–100)
MONOCYTES ABSOLUTE: 1.1 K/UL (ref 0.2–0.8)
MONOCYTES RELATIVE PERCENT: 8 %
NEUTROPHILS ABSOLUTE: 11.5 K/UL (ref 2–7.5)
NEUTROPHILS RELATIVE PERCENT: 85.3 %
PDW BLD-RTO: 13.1 % (ref 11–16)
PLATELET # BLD: 299 K/UL (ref 150–400)
PMV BLD AUTO: 9 FL (ref 6–10)
POTASSIUM SERPL-SCNC: 5.2 MMOL/L (ref 3.4–5.1)
RBC # BLD: 3.88 M/UL (ref 3.8–5.8)
SODIUM BLD-SCNC: 132 MMOL/L (ref 136–145)
TOTAL PROTEIN: 5.6 G/DL (ref 6.4–8.3)
WBC # BLD: 13.5 K/UL (ref 4–11)

## 2021-07-03 PROCEDURE — 6370000000 HC RX 637 (ALT 250 FOR IP): Performed by: INTERNAL MEDICINE

## 2021-07-03 PROCEDURE — 6360000002 HC RX W HCPCS: Performed by: PHYSICIAN ASSISTANT

## 2021-07-03 PROCEDURE — 2700000000 HC OXYGEN THERAPY PER DAY

## 2021-07-03 PROCEDURE — 6370000000 HC RX 637 (ALT 250 FOR IP): Performed by: NURSE PRACTITIONER

## 2021-07-03 PROCEDURE — 1200000000 HC SEMI PRIVATE

## 2021-07-03 PROCEDURE — 36415 COLL VENOUS BLD VENIPUNCTURE: CPT

## 2021-07-03 PROCEDURE — 85379 FIBRIN DEGRADATION QUANT: CPT

## 2021-07-03 PROCEDURE — 99233 SBSQ HOSP IP/OBS HIGH 50: CPT | Performed by: INTERNAL MEDICINE

## 2021-07-03 PROCEDURE — 6360000002 HC RX W HCPCS: Performed by: NURSE PRACTITIONER

## 2021-07-03 PROCEDURE — 2580000003 HC RX 258: Performed by: INTERNAL MEDICINE

## 2021-07-03 PROCEDURE — 6360000002 HC RX W HCPCS: Performed by: INTERNAL MEDICINE

## 2021-07-03 PROCEDURE — 85025 COMPLETE CBC W/AUTO DIFF WBC: CPT

## 2021-07-03 PROCEDURE — 80053 COMPREHEN METABOLIC PANEL: CPT

## 2021-07-03 PROCEDURE — 71045 X-RAY EXAM CHEST 1 VIEW: CPT

## 2021-07-03 PROCEDURE — 6370000000 HC RX 637 (ALT 250 FOR IP): Performed by: PHYSICIAN ASSISTANT

## 2021-07-03 RX ORDER — FUROSEMIDE 10 MG/ML
20 INJECTION INTRAMUSCULAR; INTRAVENOUS ONCE
Status: COMPLETED | OUTPATIENT
Start: 2021-07-03 | End: 2021-07-03

## 2021-07-03 RX ORDER — LEVOFLOXACIN 5 MG/ML
750 INJECTION, SOLUTION INTRAVENOUS EVERY 24 HOURS
Status: DISCONTINUED | OUTPATIENT
Start: 2021-07-03 | End: 2021-07-03

## 2021-07-03 RX ADMIN — ENOXAPARIN SODIUM 40 MG: 40 INJECTION SUBCUTANEOUS at 08:50

## 2021-07-03 RX ADMIN — METOCLOPRAMIDE 5 MG: 5 INJECTION, SOLUTION INTRAMUSCULAR; INTRAVENOUS at 17:09

## 2021-07-03 RX ADMIN — DILTIAZEM HYDROCHLORIDE 120 MG: 120 CAPSULE, COATED, EXTENDED RELEASE ORAL at 08:53

## 2021-07-03 RX ADMIN — SUCRALFATE 1 G: 1 TABLET ORAL at 08:57

## 2021-07-03 RX ADMIN — POLYETHYLENE GLYCOL (3350) 17 G: 17 POWDER, FOR SOLUTION ORAL at 08:50

## 2021-07-03 RX ADMIN — Medication 1 CAPSULE: at 08:50

## 2021-07-03 RX ADMIN — SUCRALFATE 1 G: 1 TABLET ORAL at 12:16

## 2021-07-03 RX ADMIN — METOCLOPRAMIDE 5 MG: 5 INJECTION, SOLUTION INTRAMUSCULAR; INTRAVENOUS at 12:15

## 2021-07-03 RX ADMIN — SERTRALINE HYDROCHLORIDE 100 MG: 50 TABLET ORAL at 08:50

## 2021-07-03 RX ADMIN — HYDRALAZINE HYDROCHLORIDE 25 MG: 25 TABLET, FILM COATED ORAL at 20:26

## 2021-07-03 RX ADMIN — SUCRALFATE 1 G: 1 TABLET ORAL at 17:09

## 2021-07-03 RX ADMIN — CEFTRIAXONE 1000 MG: 1 INJECTION, POWDER, FOR SOLUTION INTRAMUSCULAR; INTRAVENOUS at 13:08

## 2021-07-03 RX ADMIN — SPIRONOLACTONE 25 MG: 25 TABLET, FILM COATED ORAL at 08:50

## 2021-07-03 RX ADMIN — METOCLOPRAMIDE 5 MG: 5 INJECTION, SOLUTION INTRAMUSCULAR; INTRAVENOUS at 06:21

## 2021-07-03 RX ADMIN — FUROSEMIDE 20 MG: 10 INJECTION, SOLUTION INTRAMUSCULAR; INTRAVENOUS at 14:31

## 2021-07-03 RX ADMIN — DOCUSATE SODIUM 100 MG: 100 CAPSULE ORAL at 08:50

## 2021-07-03 RX ADMIN — Medication 2000 UNITS: at 08:50

## 2021-07-03 RX ADMIN — Medication 15 ML: at 08:49

## 2021-07-03 RX ADMIN — HYDRALAZINE HYDROCHLORIDE 25 MG: 25 TABLET, FILM COATED ORAL at 08:57

## 2021-07-03 RX ADMIN — LOSARTAN POTASSIUM 50 MG: 50 TABLET, FILM COATED ORAL at 08:53

## 2021-07-03 RX ADMIN — LOSARTAN POTASSIUM 50 MG: 50 TABLET, FILM COATED ORAL at 20:25

## 2021-07-03 RX ADMIN — PANTOPRAZOLE SODIUM 40 MG: 40 TABLET, DELAYED RELEASE ORAL at 08:50

## 2021-07-03 RX ADMIN — ALPRAZOLAM 0.25 MG: 0.25 TABLET ORAL at 21:03

## 2021-07-03 RX ADMIN — METOCLOPRAMIDE 5 MG: 5 INJECTION, SOLUTION INTRAMUSCULAR; INTRAVENOUS at 22:57

## 2021-07-03 RX ADMIN — METOPROLOL TARTRATE 25 MG: 25 TABLET, FILM COATED ORAL at 20:26

## 2021-07-03 RX ADMIN — METOPROLOL TARTRATE 25 MG: 25 TABLET, FILM COATED ORAL at 08:53

## 2021-07-03 ASSESSMENT — PAIN SCALES - GENERAL
PAINLEVEL_OUTOF10: 0

## 2021-07-03 NOTE — FLOWSHEET NOTE
07/03/21 0837   Vital Signs   Temp 98.5 °F (36.9 °C)   Temp Source Temporal   Pulse 76   Heart Rate Source Monitor   Resp 18   BP (!) 132/58   BP Location Right upper arm   MAP (mmHg) 79   Oxygen Therapy   SpO2 96 %   O2 Device Nasal cannula   O2 Flow Rate (L/min) 2 L/min

## 2021-07-03 NOTE — PLAN OF CARE
Problem: Falls - Risk of:  Goal: Will remain free from falls  Description: Will remain free from falls  7/3/2021 1052 by Rohini Williamson RN  Outcome: Ongoing  7/3/2021 0304 by Suleiman Mckenna RN  Outcome: Ongoing

## 2021-07-03 NOTE — PROGRESS NOTES
@ 8:40am removed pts tele to untangle the wires  Off tele about 1min or less hooked back up  Pt was setting on the edge of the bed

## 2021-07-03 NOTE — PROGRESS NOTES
Vital signs taken oxygen 3 liters nasal cannula 99% heart rate 74 blood pressure 97/69 MAP of 76- Family not sure severity of allergic reaction to Cephalexin-Penicillins-Will stay in room with pt to monitor her while receiving Rocephin IV-Pt tolerating well at this time -will monitor

## 2021-07-03 NOTE — PROGRESS NOTES
Pt. Tolerated Rocephin well -Vital signs taken -WNL\"s oxygen 99 % on 3 liters nasal cannula-pt has no complaints at this time -Will monitor

## 2021-07-03 NOTE — FLOWSHEET NOTE
07/03/21 0930   Assessment   Charting Type Shift assessment   Neurological   Neuro (WDL) WDL   Level of Consciousness Alert (0)   Swallow Screening   Is the patient able to remain alert for testing? Yes   Was the Patient Eating a Modified Diet Prior to being Admitted? No   Stefany Coma Scale   Eye Opening 4   Best Verbal Response 5   Best Motor Response 6   Baldwin Coma Scale Score 15   NIHSS Stroke Scale   NIHSS Stroke Scale Assessed No   HEENT   HEENT (WDL) WDL   Respiratory   Respiratory (WDL) WDL   Breath Sounds   Right Upper Lobe Clear   Right Middle Lobe Clear   Right Lower Lobe Diminished   Left Upper Lobe Clear   Left Lower Lobe Diminished   Cough/Sputum   Sputum How Obtained None   Cardiac   Cardiac (WDL) X   Cardiac Regularity Regular   Cardiac Rhythm NSR  (frequent SVPVC's HX afib)   Cardiac Monitor   Telemetry Monitor On Yes   Telemetry Audible Yes   Telemetry Alarms Set Yes   Telemetry Box Number [id number]   Telemetry Monitor Alarm Parameters [id number]   Gastrointestinal   Abdominal (WDL) X   Hernia Other (Comment)  (hiatal)   Tenderness Nontender   RUQ Bowel Sounds Active   LUQ Bowel Sounds Active   RLQ Bowel Sounds Active   LLQ Bowel Sounds Active   Peripheral Vascular   Peripheral Vascular (WDL) WDL   Edema Right upper extremity; Left upper extremity;Right lower extremity; Left lower extremity;Periorbital   RUE Edema Trace   LUE Edema Trace   RLE Edema +1;Pitting   LLE Edema +1;Pitting   Skin Color/Condition   Skin Color/Condition (WDL) X   Skin Color Pale   Skin Condition/Temp Dry; Warm   Skin Integrity   Skin Integrity (WDL) WDL   Musculoskeletal   Musculoskeletal (WDL) X   RUE Weakness   LUE Weakness   RL Extremity Weakness   LL Extremity Weakness   Genitourinary   Genitourinary (WDL) WDL   Urine Assessment   Incontinence No   Urine Color Yellow/straw   Urine Appearance Clear   Anus/Rectum   Anus/Rectum (WDL) X   Evaluation Other (Comment)  (prolapsed at times)   Psychosocial   Psychosocial (WDL) X

## 2021-07-03 NOTE — PROGRESS NOTES
Patient c/o SOA. O2 sat was 98% on 2 lpm  Nasal cannula. Room temperature was 77 degrees.  I turned the thermostat down to 74 degrees and placed a fan on low away from the patient to cool the room off. DW, RRT

## 2021-07-03 NOTE — PROGRESS NOTES
Pt, daughter and respiratory therapist decided to wait and let the respiratory service chart her overnight O2 sat. It was causing anxiety for the pt and daughter.  Checked her O2 sat 98%

## 2021-07-03 NOTE — PROGRESS NOTES
DNR sheet reviewed with POA - son-Andre Juarez - Visually seen POA document on son's phone-DNR signed and placed in chart-

## 2021-07-03 NOTE — PROGRESS NOTES
Progress Note    Subjective: Interval History:     Pt continues to complain of dyspnea without hypoxia. Multiple discussions with family, and staff today. Is not having any significant desats, but pt is insisting on wearing the oxygen because she feels anxious without it. Plan had been for DC home today, but due to pt and family concerns, will monitor again overnight and plan for DC in am.    O2 tank is at bedside, because previously this admission she qualified for home o2, plan is to repeat overnight oximetry. (Though pt ultimately refused overnight oximetry readings)    Covering for Pt's PCP this weekend, who started her on lasix one time this am, which family had questions about. Review of systems:   Review of Systems   Genitourinary: Positive for dysuria. Negative for hematuria. All other systems reviewed and are negative. Medications:   Centrum 15mL PO Daily  Diltiazem 120mg ER Daily  Colace 100 mg Daily  Lovenox 40 mg SQ Daily  Have been holding Estrace, but given this AM  Lasix 20mg PO Once, PT refused  Hydralazine 25 mg tid  Lactobacillus 1 cap Daily  Linzess 145 mcg before breakfast, pt has been refusing  Losartan 50mg BID  Reglan IV 5mg Q6H scheduled  Metoprolol tartrate 25 mg BID  Pantoprazole 40mg Daily  Miralax 17 g Daily  Zoloft 100mg Daily  Spironolactone 25 mg daily  Carafate 1g four times daily  Vitamin D 2,000 units daily        Objective:   Vitals: BP (!) 124/50   Pulse 71   Temp 97 °F (36.1 °C) (Temporal)   Resp 20   Ht 5' 2\" (1.575 m)   Wt 120 lb (54.4 kg)   SpO2 99%   BMI 21.95 kg/m²     Physical exam  Physical Exam  Vitals and nursing note reviewed. Constitutional:       General: She is not in acute distress. Appearance: Normal appearance. She is not ill-appearing, toxic-appearing or diaphoretic.       Comments: Energeitc well appearing, age appropriate appearing lady, sitting up in bed resting comfortably, NAD   Eyes:      Pupils: Pupils are equal, round, and reactive to light. Cardiovascular:      Rate and Rhythm: Normal rate. Rhythm irregular. Heart sounds: No murmur heard. Pulmonary:      Effort: Pulmonary effort is normal. No respiratory distress. Breath sounds: Rhonchi present. No wheezing. Comments: Diminished b/l, soft scatter ronchi intermittently heard in LLL  Abdominal:      General: Bowel sounds are normal. There is no distension. Palpations: Abdomen is soft. Tenderness: There is no abdominal tenderness. Musculoskeletal:      Comments: B/l trace pitting edema   Skin:     General: Skin is warm and dry. Neurological:      General: No focal deficit present. Mental Status: She is alert and oriented to person, place, and time. Cranial Nerves: No cranial nerve deficit. Motor: No weakness. Psychiatric:         Mood and Affect: Mood normal.         Behavior: Behavior normal.      Comments: Grossly euthymic, thought content slightly anxious, but without anxious mood or affect       Results:     Sodium 127 today, was 134 yesterday  Cr 0.6  HGB 11.4    Assessment and Plan:      Active Hospital Problems    Diagnosis Date Noted    PAF (paroxysmal atrial fibrillation) (HCC) [I48.0] 07/01/2021    Gastroesophageal reflux disease without esophagitis [K21.9] 06/29/2021    Rectal prolapse [K62.3] 06/29/2021    Hernia, hiatal [K44.9] 06/29/2021    PAC (premature atrial contraction) [I49.1] 06/29/2021    Constipation [K59.00] 06/27/2021    Nausea [R11.0] 06/27/2021    Weakness [R53.1] 06/26/2021    Hyponatremia [E87.1] 06/26/2021    B12 deficiency [E53.8] 05/17/2016    Essential hypertension [I10] 11/24/2015    Anxiety [F41.9] 08/01/2014    Pulmonary HTN (HCC) [I27.20] 03/22/2013     Dyspnea w/o Hypoxia  - Due Episode of worsening soa early this AM prompted PCP to order lasix while still on call, family had lots of questions, and addressed them with Dr. Stan Maldonado  - Currently not hypoxic, due to episode and concerns, will monitor pt tonight, repeat overnight oximetry as pt is sating 95% on RA, but becomes anxious if the NC is out of her nose  - Does not have h/o severe copd, could be related to Pulm HTN, or GERD    GERD, hiatal hernia  - On ppi and carafate    Rectal Prolapse  - POA, resolved    Anxiety  - Continue home zoloft, and prn xanax    Plan is for DC in AM w/ home health, home o2 as previously qualified for, and possible recert when pt is home    > 35 minutes in floor time spent with the pt, family and staff coordinating care, and addressing family concerns    Electronically signed by El Palomo MD on 7/3/2021 at 2:47 PM    Note for DOS 7/2, though documentation completed on 7/3 due to error

## 2021-07-03 NOTE — PROGRESS NOTES
Progress Note    Subjective: Interval History:    Plan had been for Pt to DC this AM w/ home health, and home o2 (nocturnal and with ambulation), though pt yesterday was insisting on wearing the o2 constantly because she felt less anxious when it was on. Multiple discussions with respiratory yesterday and pt did not have signs of significant desats. Review of tele from overnight shows a few desats, but none of them had good pleth. Nursing this AM changed out her batteries and sensor on her monitor. Pt this AM pt got up to bedside commode, and was on the commode for about 20 minutes prior to being transferred back to bed. Immediately on standing pt desated down into the 70's, RT was called and was immediately at the bedside, and pt required Venturi mask w/ 50 L/min o2 to bring her sats back up to 90's after 3 minutes. Prior to her standing, pt had been sating in the mid 90's on 2L nc, and had been monitored the whole time except for a brief interruption where the aid needed to detangle some leads. Dc was cancelled, and subsequently on exam the pt reported feeling more tired, weak, new wet cough, vocal hoarseness, and fatigue. SOA is still about the same as yesterday. She states she feels 'like Carmelia Pry'. Daughter describes it as choking, pt denies feeling food become stuck, but isn't able to give good or specific history to eval for dysphagia or odynophagia    Continues to have anorexia associated with nausea that has been present for a few days, and family is very concerned about her caloric intake.      Pt also developed some dysuria that was reported last night when daughter noted pt to be more confused than usual.    Daughter states patient feels that she is dying, and it is the family's feeling that this is \"all in her head, and she needs to use mind over matter.' Had discussion at bedside that current clinical gestalt was that this was an acute illness, and she would be expected to make a recovery, but that we ultimately did not have any way of knowing for sure. After ordering ABX, noted unspecified allergy to keflex and pcn. Daughter did not know what the reaction had been, and deferred to her brother. Brother initially requested we do not use a cephalosporin. ABX had been switched to levaquin, but switched back to Rocephin when family then stated they were ok with rocephin if she was monitored for 30 minutes after being given a dose to monitor tolerance. Still unclear what historical symptoms pt had to get these abx listed as an allergy. Review of systems:   Review of Systems   Constitutional: Positive for chills. Medications:   Scheduled Meds:   cefTRIAXone (ROCEPHIN) IV  1,000 mg Intravenous Q24H    furosemide  20 mg Oral Once    metoprolol tartrate  25 mg Oral BID    CENTRUM/CERTA-GLADYS with minerals oral  15 mL Oral Daily    spironolactone  25 mg Oral Daily    dilTIAZem  120 mg Oral Daily    hydrALAZINE  25 mg Oral 3 times per day    metoclopramide  5 mg Intravenous Q6H    losartan  50 mg Oral BID    linaclotide  145 mcg Oral QAM AC    pantoprazole  40 mg Oral QAM AC    sucralfate  1 g Oral 4 times per day    sertraline  100 mg Oral Daily    enoxaparin  40 mg Subcutaneous Daily    polyethylene glycol  17 g Oral Daily    Vitamin D  2,000 Units Oral Daily    docusate sodium  100 mg Oral Daily    estradiol  1 g Vaginal Daily    lactobacillus  1 capsule Oral Daily     Continuous Infusions:    Objective:   Vitals: BP (!) 132/58   Pulse 76   Temp 98.5 °F (36.9 °C) (Temporal)   Resp 18   Ht 5' 2\" (1.575 m)   Wt 120 lb (54.4 kg)   SpO2 95%   BMI 21.95 kg/m²     Physical exam  Physical Exam  Vitals and nursing note reviewed. Constitutional:       General: She is not in acute distress. Appearance: She is ill-appearing. Comments: More tired and lethargic than yesterday, appears acutely ill, but not in acute respiratory distress or acutely toxic.    Eyes: Pupils: Pupils are equal, round, and reactive to light. Cardiovascular:      Rate and Rhythm: Normal rate. Rhythm irregular. Heart sounds: Normal heart sounds. Pulmonary:      Effort: No respiratory distress. Breath sounds: No wheezing, rhonchi or rales. Comments: Shallow effort, diminished b/l, more on Left at the bases, no ronchi or crackles appreciated  Vesicular bs in the b/l apexes. Abdominal:      General: Abdomen is flat. There is no distension. Palpations: Abdomen is soft. Tenderness: There is no abdominal tenderness. There is no guarding. Musculoskeletal:      Comments: At most b/l trace pitting edema at the mid shin   Neurological:      General: No focal deficit present. Mental Status: She is disoriented. Cranial Nerves: No cranial nerve deficit. Motor: No weakness. Comments: Had delay in answering questions   Psychiatric:         Behavior: Behavior normal.       Results:     CBC:   Lab Results   Component Value Date    WBC 13.5 07/03/2021    RBC 3.88 07/03/2021    HGB 12.1 07/03/2021    HCT 39.2 07/03/2021    .0 07/03/2021    RDW 13.1 07/03/2021     07/03/2021     CMP:    Lab Results   Component Value Date     07/03/2021    K 5.2 07/03/2021    K 4.0 07/01/2021    CL 95 07/03/2021    CO2 35 07/03/2021    BUN 29 07/03/2021    CREATININE <0.5 07/03/2021    GFRAA >59 07/03/2021    AGRATIO 1.4 07/03/2021    LABGLOM >60 07/03/2021    GLUCOSE 150 07/03/2021    PROT 5.6 07/03/2021    CALCIUM 10.0 07/03/2021    BILITOT <0.2 07/03/2021    ALKPHOS 68 07/03/2021    AST 40 07/03/2021    ALT 65 07/03/2021     Magnesium:    Lab Results   Component Value Date    MG 1.7 06/30/2021         Assessment and Plan:      Active Hospital Problems    Diagnosis Date Noted    PAF (paroxysmal atrial fibrillation) ([facility]) [I48.0] 07/01/2021    Gastroesophageal reflux disease without esophagitis [K21.9] 06/29/2021    Rectal prolapse [K62.3] 06/29/2021    Hernia, hiatal [K44.9] 06/29/2021    PAC (premature atrial contraction) [I49.1] 06/29/2021    Constipation [K59.00] 06/27/2021    Nausea [R11.0] 06/27/2021    Weakness [R53.1] 06/26/2021    Hyponatremia [E87.1] 06/26/2021    B12 deficiency [E53.8] 05/17/2016    Essential hypertension [I10] 11/24/2015    Anxiety [F41.9] 08/01/2014    Pulmonary HTN (HCC) [I27.20] 03/22/2013       Acute hypoxic respiratory failure w/ SOA, Cough  - DDX broad, discussed w/ Dr. Juan Record this AM  - D-Dimer negative, Pt not tachycardic, doubt PE, already had CT chest done a few days ago  - ? Pulm HTN - RSVP 58 mmHg  - ? Related to b/l Pleural effusions, Slight volume overload on exam, CR 0.5, BP's overall 120's-130's  - Check CXR to eval  - ? Related to intrathoracic stomach and reflux / aspiration pneumonitis  - Speech consult, May need Barium Shallow or EGD  - Will try one time dose of lasix 20mg IV today    Hyponatremia  - Prior to admit, NA was 132-133 since Jun 2020  - NA was 125 on admit, and dropped to 122  - Serum osmol 261 on 6/27, U Na < 20,   - Has been treated with hypertonic saline  - Prior studies suggestive of Hypovolemic hyponatremia, or low effective volume hyponatermia  - Though could be multifactorial with component of chronic SIADH, as pt is on sertraline at home, and Sodium had been low 130's for past year  - Sodium 132 today    UTI  - U/A suggestive, sent for culture  - Started Rocephin after discussion with the family, will monitor for rxn    New Afib w/ RVR  - Betablocker and cardizem, was evalated by cards    B/l Rt>Lft Pleural Effusions    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

## 2021-07-04 ENCOUNTER — APPOINTMENT (OUTPATIENT)
Dept: GENERAL RADIOLOGY | Facility: HOSPITAL | Age: 86
DRG: 643 | End: 2021-07-04
Attending: INTERNAL MEDICINE
Payer: MEDICARE

## 2021-07-04 LAB
A/G RATIO: 1.2 (ref 0.8–2)
ALBUMIN SERPL-MCNC: 2.8 G/DL (ref 3.4–4.8)
ALP BLD-CCNC: 57 U/L (ref 25–100)
ALT SERPL-CCNC: 63 U/L (ref 4–36)
ANION GAP SERPL CALCULATED.3IONS-SCNC: 2 MMOL/L (ref 3–16)
AST SERPL-CCNC: 31 U/L (ref 8–33)
BASOPHILS ABSOLUTE: 0 K/UL (ref 0–0.1)
BASOPHILS RELATIVE PERCENT: 0.1 %
BILIRUB SERPL-MCNC: <0.2 MG/DL (ref 0.3–1.2)
BUN BLDV-MCNC: 31 MG/DL (ref 6–20)
CALCIUM SERPL-MCNC: 9.5 MG/DL (ref 8.5–10.5)
CHLORIDE BLD-SCNC: 95 MMOL/L (ref 98–107)
CO2: 34 MMOL/L (ref 20–30)
CREAT SERPL-MCNC: 0.6 MG/DL (ref 0.4–1.2)
EOSINOPHILS ABSOLUTE: 0.1 K/UL (ref 0–0.4)
EOSINOPHILS RELATIVE PERCENT: 0.9 %
GFR AFRICAN AMERICAN: >59
GFR NON-AFRICAN AMERICAN: >60
GLOBULIN: 2.3 G/DL
GLUCOSE BLD-MCNC: 109 MG/DL (ref 74–106)
HCT VFR BLD CALC: 35.9 % (ref 37–47)
HEMOGLOBIN: 10.8 G/DL (ref 11.5–16.5)
IMMATURE GRANULOCYTES #: 0.1 K/UL
IMMATURE GRANULOCYTES %: 0.6 % (ref 0–5)
LYMPHOCYTES ABSOLUTE: 0.8 K/UL (ref 1.5–4)
LYMPHOCYTES RELATIVE PERCENT: 8.1 %
MAGNESIUM: 2 MG/DL (ref 1.7–2.4)
MCH RBC QN AUTO: 30.9 PG (ref 27–32)
MCHC RBC AUTO-ENTMCNC: 30.1 G/DL (ref 31–35)
MCV RBC AUTO: 102.9 FL (ref 80–100)
MONOCYTES ABSOLUTE: 1.1 K/UL (ref 0.2–0.8)
MONOCYTES RELATIVE PERCENT: 10.6 %
NEUTROPHILS ABSOLUTE: 8.2 K/UL (ref 2–7.5)
NEUTROPHILS RELATIVE PERCENT: 79.7 %
PDW BLD-RTO: 13.1 % (ref 11–16)
PHOSPHORUS: 2.5 MG/DL (ref 2.5–4.5)
PLATELET # BLD: 289 K/UL (ref 150–400)
PMV BLD AUTO: 9.5 FL (ref 6–10)
POTASSIUM REFLEX MAGNESIUM: 5 MMOL/L (ref 3.4–5.1)
RBC # BLD: 3.49 M/UL (ref 3.8–5.8)
SODIUM BLD-SCNC: 131 MMOL/L (ref 136–145)
TOTAL PROTEIN: 5.1 G/DL (ref 6.4–8.3)
URINE CULTURE, ROUTINE: NORMAL
WBC # BLD: 10.3 K/UL (ref 4–11)

## 2021-07-04 PROCEDURE — 6370000000 HC RX 637 (ALT 250 FOR IP): Performed by: NURSE PRACTITIONER

## 2021-07-04 PROCEDURE — 85025 COMPLETE CBC W/AUTO DIFF WBC: CPT

## 2021-07-04 PROCEDURE — 6360000002 HC RX W HCPCS: Performed by: INTERNAL MEDICINE

## 2021-07-04 PROCEDURE — 6370000000 HC RX 637 (ALT 250 FOR IP): Performed by: PHYSICIAN ASSISTANT

## 2021-07-04 PROCEDURE — 36415 COLL VENOUS BLD VENIPUNCTURE: CPT

## 2021-07-04 PROCEDURE — 2580000003 HC RX 258: Performed by: INTERNAL MEDICINE

## 2021-07-04 PROCEDURE — 6360000002 HC RX W HCPCS: Performed by: PHYSICIAN ASSISTANT

## 2021-07-04 PROCEDURE — 84100 ASSAY OF PHOSPHORUS: CPT

## 2021-07-04 PROCEDURE — 6370000000 HC RX 637 (ALT 250 FOR IP): Performed by: INTERNAL MEDICINE

## 2021-07-04 PROCEDURE — 99233 SBSQ HOSP IP/OBS HIGH 50: CPT | Performed by: INTERNAL MEDICINE

## 2021-07-04 PROCEDURE — 2700000000 HC OXYGEN THERAPY PER DAY

## 2021-07-04 PROCEDURE — 6360000002 HC RX W HCPCS: Performed by: NURSE PRACTITIONER

## 2021-07-04 PROCEDURE — 1200000000 HC SEMI PRIVATE

## 2021-07-04 PROCEDURE — 71045 X-RAY EXAM CHEST 1 VIEW: CPT

## 2021-07-04 PROCEDURE — 80053 COMPREHEN METABOLIC PANEL: CPT

## 2021-07-04 PROCEDURE — 83735 ASSAY OF MAGNESIUM: CPT

## 2021-07-04 RX ORDER — MORPHINE SULFATE 2 MG/ML
1 INJECTION, SOLUTION INTRAMUSCULAR; INTRAVENOUS ONCE
Status: COMPLETED | OUTPATIENT
Start: 2021-07-04 | End: 2021-07-04

## 2021-07-04 RX ADMIN — ENOXAPARIN SODIUM 40 MG: 40 INJECTION SUBCUTANEOUS at 08:56

## 2021-07-04 RX ADMIN — HYDRALAZINE HYDROCHLORIDE 25 MG: 25 TABLET, FILM COATED ORAL at 12:40

## 2021-07-04 RX ADMIN — SERTRALINE HYDROCHLORIDE 100 MG: 50 TABLET ORAL at 08:51

## 2021-07-04 RX ADMIN — LOSARTAN POTASSIUM 50 MG: 50 TABLET, FILM COATED ORAL at 08:51

## 2021-07-04 RX ADMIN — METOPROLOL TARTRATE 25 MG: 25 TABLET, FILM COATED ORAL at 20:33

## 2021-07-04 RX ADMIN — METOPROLOL TARTRATE 25 MG: 25 TABLET, FILM COATED ORAL at 08:51

## 2021-07-04 RX ADMIN — DOCUSATE SODIUM 100 MG: 100 CAPSULE ORAL at 08:50

## 2021-07-04 RX ADMIN — METOCLOPRAMIDE 5 MG: 5 INJECTION, SOLUTION INTRAMUSCULAR; INTRAVENOUS at 17:57

## 2021-07-04 RX ADMIN — Medication 1 CAPSULE: at 08:51

## 2021-07-04 RX ADMIN — SUCRALFATE 1 G: 1 TABLET ORAL at 12:39

## 2021-07-04 RX ADMIN — Medication 2000 UNITS: at 08:50

## 2021-07-04 RX ADMIN — ESTRADIOL 1 G: 0.1 CREAM VAGINAL at 08:56

## 2021-07-04 RX ADMIN — DILTIAZEM HYDROCHLORIDE 120 MG: 120 CAPSULE, COATED, EXTENDED RELEASE ORAL at 08:51

## 2021-07-04 RX ADMIN — CEFTRIAXONE 1000 MG: 1 INJECTION, POWDER, FOR SOLUTION INTRAMUSCULAR; INTRAVENOUS at 12:41

## 2021-07-04 RX ADMIN — POLYETHYLENE GLYCOL (3350) 17 G: 17 POWDER, FOR SOLUTION ORAL at 08:55

## 2021-07-04 RX ADMIN — HYDRALAZINE HYDROCHLORIDE 25 MG: 25 TABLET, FILM COATED ORAL at 20:23

## 2021-07-04 RX ADMIN — METOCLOPRAMIDE 5 MG: 5 INJECTION, SOLUTION INTRAMUSCULAR; INTRAVENOUS at 12:40

## 2021-07-04 RX ADMIN — MORPHINE SULFATE 1 MG: 2 INJECTION, SOLUTION INTRAMUSCULAR; INTRAVENOUS at 14:11

## 2021-07-04 RX ADMIN — METOCLOPRAMIDE 5 MG: 5 INJECTION, SOLUTION INTRAMUSCULAR; INTRAVENOUS at 04:50

## 2021-07-04 RX ADMIN — LOSARTAN POTASSIUM 50 MG: 50 TABLET, FILM COATED ORAL at 20:33

## 2021-07-04 RX ADMIN — SPIRONOLACTONE 25 MG: 25 TABLET, FILM COATED ORAL at 08:51

## 2021-07-04 ASSESSMENT — ENCOUNTER SYMPTOMS
NAUSEA: 1
CHEST TIGHTNESS: 1
COUGH: 1
CHOKING: 1
SHORTNESS OF BREATH: 1

## 2021-07-04 ASSESSMENT — PAIN SCALES - GENERAL
PAINLEVEL_OUTOF10: 0
PAINLEVEL_OUTOF10: 2

## 2021-07-04 NOTE — FLOWSHEET NOTE
Pt resting quietly in bed daughter at bedside. Am assessment complete, respirations equal and unlabored. Pt took am medications without difficulty. Pt instructed to call out with any needs or concerns, none at this time. Call bell within pt reach.

## 2021-07-04 NOTE — PROGRESS NOTES
I went to check on the pt because her nurse wanted a visual on pt. Pt was laying back on the pillow with chin stuck in the air. She says that she is nauseous at first. I asked if she wanted to take something for nausea. She stated \" I don't need that, It was mind over matter and I am going to  Make it better. I am Shady Balo, I am black. I don't want anything. I seen it all happen, I couldn't breath. I am black, please believe I'm not joking. I asked her if I could do anything. She stated\" You all have helped me. I am doing better. \" She was laying straight back with her chin still in the air. Her sats are doing well and she has no distress at this time. Pt is a little confused this A. M.

## 2021-07-04 NOTE — FLOWSHEET NOTE
07/03/21 2325   Assessment   Charting Type Shift assessment   Neurological   Neuro (WDL) WDL   Level of Consciousness Alert (0)   Swallow Screening   Is the patient able to remain alert for testing? Yes   Stefany Coma Scale   Eye Opening 4   Best Verbal Response 5   Best Motor Response 6   Stefany Coma Scale Score 15   HEENT   HEENT (WDL) WDL   Respiratory   Respiratory (WDL) WDL   Breath Sounds   Right Upper Lobe Clear   Right Middle Lobe Clear   Right Lower Lobe Diminished   Left Upper Lobe Clear   Left Lower Lobe Diminished   Cough/Sputum   Sputum How Obtained None   Cardiac   Cardiac (WDL) X   Cardiac Regularity Regular   Rhythm Interpretation   Pulse 68   Cardiac Monitor   Telemetry Monitor On Yes   Telemetry Audible Yes   Telemetry Alarms Set Yes   Telemetry Box Number [id number]   Telemetry Monitor Alarm Parameters MX40-12   Gastrointestinal   Abdominal (WDL) X   Abdomen Inspection Soft   Last BM (including prior to admit) 07/03/21   RUQ Bowel Sounds Active   LUQ Bowel Sounds Active   RLQ Bowel Sounds Active   LLQ Bowel Sounds Active   Peripheral Vascular   Peripheral Vascular (WDL) WDL   Edema Right upper extremity; Left upper extremity;Right lower extremity; Left lower extremity;Periorbital   RUE Edema Trace   LUE Edema Trace   RLE Edema Trace   LLE Edema Trace   Skin Color/Condition   Skin Color/Condition (WDL) X   Skin Color Pale   Skin Condition/Temp Warm;Dry   Skin Integrity   Skin Integrity (WDL) WDL   Musculoskeletal   Musculoskeletal (WDL) X   RUE Weakness   LUE Weakness   RL Extremity Weakness   LL Extremity Weakness   Genitourinary   Genitourinary (WDL) WDL   Urine Assessment   Incontinence No   Urine Color Yellow/straw   Urine Appearance Clear   Psychosocial   Psychosocial (WDL) X   Patient Behaviors Anxious

## 2021-07-04 NOTE — PROGRESS NOTES
recovering soon, and should not continue to worsen. Given the uncertainty on if the pt was passively dying, we agreed to continue full care, and see how the patient was doing tomorrow. If it is her time, daughter expressed they understood, and would want to focus on comfort. (Though during this discussion, the pt stated in no uncertain terms that she knew she was dying.)    The pt asked if she could go home with hospice to die at home. I explained I will defer that decision to the doctors who know her well, and the family based on her clinical condition tomorrow. Review of systems:   Review of Systems   Constitutional: Positive for activity change, appetite change and fatigue. Respiratory: Positive for cough, choking, chest tightness and shortness of breath. Gastrointestinal: Positive for nausea. Psychiatric/Behavioral: Positive for confusion and hallucinations. The patient is nervous/anxious.         Medications:   Scheduled Meds:   cefTRIAXone (ROCEPHIN) IV  1,000 mg Intravenous Q24H    furosemide  20 mg Oral Once    metoprolol tartrate  25 mg Oral BID    CENTRUM/CERTA-GLADYS with minerals oral  15 mL Oral Daily    spironolactone  25 mg Oral Daily    dilTIAZem  120 mg Oral Daily    hydrALAZINE  25 mg Oral 3 times per day    metoclopramide  5 mg Intravenous Q6H    losartan  50 mg Oral BID    linaclotide  145 mcg Oral QAM AC    pantoprazole  40 mg Oral QAM AC    sucralfate  1 g Oral 4 times per day    sertraline  100 mg Oral Daily    enoxaparin  40 mg Subcutaneous Daily    polyethylene glycol  17 g Oral Daily    Vitamin D  2,000 Units Oral Daily    docusate sodium  100 mg Oral Daily    estradiol  1 g Vaginal Daily    lactobacillus  1 capsule Oral Daily     Continuous Infusions:    Objective:   Vitals: BP (!) 160/78   Pulse 75   Temp 98.2 °F (36.8 °C) (Oral)   Resp 16   Ht 5' 2\" (1.575 m)   Wt 120 lb (54.4 kg)   SpO2 100%   BMI 21.95 kg/m²     Physical exam  Physical Exam  Vitals and nursing note reviewed. Constitutional:       General: She is not in acute distress. Appearance: She is ill-appearing. She is not toxic-appearing. Comments: Vocal quality less wet   Eyes:      Pupils: Pupils are equal, round, and reactive to light. Cardiovascular:      Rate and Rhythm: Normal rate. Rhythm irregular. Pulmonary:      Breath sounds: No stridor. No wheezing, rhonchi or rales. Abdominal:      General: There is no distension. Palpations: Abdomen is soft. Tenderness: There is no abdominal tenderness. There is no guarding. Comments: Hypoactive BS   Musculoskeletal:      Comments: B/l trace at most pitting edema in the LE   Skin:     General: Skin is warm and dry. Neurological:      Mental Status: She is disoriented. Cranial Nerves: No cranial nerve deficit. Psychiatric:         Behavior: Behavior normal.      Comments: Rapidly waxing and waning ability to express clearly articulate her thoughts, with episodes of tangentially or expressing things like \"I'm black\" when she was trying to say she feels like rah daisha and can't breathe         Results:     CBC:   Lab Results   Component Value Date    WBC 10.3 07/04/2021    RBC 3.49 07/04/2021    HGB 10.8 07/04/2021    HCT 35.9 07/04/2021    .9 07/04/2021    RDW 13.1 07/04/2021     07/04/2021     BMP:    Lab Results   Component Value Date     07/04/2021    K 5.0 07/04/2021    CL 95 07/04/2021    CO2 34 07/04/2021    BUN 31 07/04/2021    CREATININE 0.6 07/04/2021    CALCIUM 9.5 07/04/2021    GFRAA >59 07/04/2021    LABGLOM >60 07/04/2021    GLUCOSE 109 07/04/2021         Assessment and Plan:      Active Hospital Problems    Diagnosis Date Noted    PAF (paroxysmal atrial fibrillation) ([facility]) [I48.0] 07/01/2021    Gastroesophageal reflux disease without esophagitis [K21.9] 06/29/2021    Rectal prolapse [K62.3] 06/29/2021    Hernia, hiatal [K44.9] 06/29/2021    PAC (premature atrial contraction) [I49.1] 06/29/2021    Constipation [K59.00] 06/27/2021    Nausea [R11.0] 06/27/2021    Weakness [R53.1] 06/26/2021    Hyponatremia [E87.1] 06/26/2021    B12 deficiency [E53.8] 05/17/2016    Essential hypertension [I10] 11/24/2015    Anxiety [F41.9] 08/01/2014    Pulmonary HTN (HCC) [I27.20] 03/22/2013     Acute hypoxic respiratory failure w/ SOA, Cough  - See yesterday's note on DDX and workup  - Most likely intrathoracic stomach and reflux / aspiration pneumonitis  - CXR today, hernia not worse than previously  - Speech consult, Barium [place] when available based on discussion w/ family and pt tomorrow and GOC     Hyponatremia, multifactorial, acute (hypovolemia) on chronic (SIADH)  - Sodium 131 today, stable, continue current     UTI  - U/A suggestive, sent for culture  - Continue Rocephin      New Afib w/ RVR  - Betablocker and cardizem, was evalated by cards     B/l Rt>Lft Pleural Effusions     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

## 2021-07-04 NOTE — FLOWSHEET NOTE
07/04/21 0850   Assessment   Charting Type Shift assessment   Neurological   Neuro (WDL) WDL   Level of Consciousness Alert (0)   Stefany Coma Scale   Eye Opening 4   Best Verbal Response 4   Best Motor Response 6   Stefany Coma Scale Score 14   HEENT   HEENT (WDL) WDL   Respiratory   Respiratory (WDL) WDL   Breath Sounds   Right Upper Lobe Clear   Right Middle Lobe Clear   Right Lower Lobe Diminished   Left Upper Lobe Clear   Left Lower Lobe Diminished   Cough/Sputum   Sputum How Obtained None   Cardiac   Cardiac (WDL) X   Cardiac Regularity Regular   Cardiac Monitor   Telemetry Monitor On Yes   Telemetry Audible Yes   Telemetry Alarms Set Yes   Telemetry Box Number [id number]   Telemetry Monitor Alarm Parameters MX40-12   Gastrointestinal   Abdominal (WDL) X   Abdomen Inspection Soft   RUQ Bowel Sounds Active   LUQ Bowel Sounds Active   RLQ Bowel Sounds Active   LLQ Bowel Sounds Active   Peripheral Vascular   Peripheral Vascular (WDL) WDL   Edema Right upper extremity; Left upper extremity;Right lower extremity; Left lower extremity;Periorbital   RUE Edema Trace   LUE Edema Trace   RLE Edema Trace   LLE Edema Trace   Skin Color/Condition   Skin Color/Condition (WDL) X   Skin Color Pale   Skin Condition/Temp Warm;Dry   Skin Integrity   Skin Integrity (WDL) WDL   Musculoskeletal   Musculoskeletal (WDL) X   RUE Weakness   LUE Weakness   RL Extremity Weakness   LL Extremity Weakness   Genitourinary   Genitourinary (WDL) WDL   Urine Assessment   Incontinence No   Urine Color Yellow/straw   Urine Appearance Clear   Psychosocial   Psychosocial (WDL) X   Patient Behaviors Anxious

## 2021-07-05 PROBLEM — J96.01 ACUTE RESPIRATORY FAILURE WITH HYPOXIA AND HYPERCAPNIA (HCC): Status: ACTIVE | Noted: 2021-07-05

## 2021-07-05 PROBLEM — J96.02 ACUTE RESPIRATORY FAILURE WITH HYPOXIA AND HYPERCAPNIA (HCC): Status: ACTIVE | Noted: 2021-07-05

## 2021-07-05 PROBLEM — N39.0 URINARY TRACT INFECTION WITHOUT HEMATURIA: Status: ACTIVE | Noted: 2021-07-05

## 2021-07-05 LAB
A/G RATIO: 1.3 (ref 0.8–2)
ALBUMIN SERPL-MCNC: 2.9 G/DL (ref 3.4–4.8)
ALP BLD-CCNC: 60 U/L (ref 25–100)
ALT SERPL-CCNC: 95 U/L (ref 4–36)
ANION GAP SERPL CALCULATED.3IONS-SCNC: -2 MMOL/L (ref 3–16)
AST SERPL-CCNC: 43 U/L (ref 8–33)
BASE EXCESS ARTERIAL: 10.9 MMOL/L (ref -3–3)
BASE EXCESS ARTERIAL: 11.6 MMOL/L (ref -3–3)
BILIRUB SERPL-MCNC: <0.2 MG/DL (ref 0.3–1.2)
BUN BLDV-MCNC: 32 MG/DL (ref 6–20)
CALCIUM SERPL-MCNC: 9.8 MG/DL (ref 8.5–10.5)
CHLORIDE BLD-SCNC: 95 MMOL/L (ref 98–107)
CO2: 40 MMOL/L (ref 20–30)
CREAT SERPL-MCNC: <0.5 MG/DL (ref 0.4–1.2)
FIO2: 0.28 %
FIO2: 0.32 %
GFR AFRICAN AMERICAN: >59
GFR NON-AFRICAN AMERICAN: >60
GLOBULIN: 2.2 G/DL
GLUCOSE BLD-MCNC: 114 MG/DL (ref 74–106)
HCO3 ARTERIAL: 36.8 MMOL/L (ref 22–26)
HCO3 ARTERIAL: 39.9 MMOL/L (ref 22–26)
HCT VFR BLD CALC: 36.4 % (ref 37–47)
HEMOGLOBIN: 11 G/DL (ref 11.5–16.5)
MCH RBC QN AUTO: 31.1 PG (ref 27–32)
MCHC RBC AUTO-ENTMCNC: 30.2 G/DL (ref 31–35)
MCV RBC AUTO: 102.8 FL (ref 80–100)
O2 SAT, ARTERIAL: 93.8 %
O2 SAT, ARTERIAL: 99.1 %
O2 THERAPY: ABNORMAL
O2 THERAPY: ABNORMAL
PCO2 ARTERIAL: 51.2 MMHG (ref 35–45)
PCO2 ARTERIAL: 85.1 MMHG (ref 35–45)
PDW BLD-RTO: 12.9 % (ref 11–16)
PH ARTERIAL: 7.29 (ref 7.35–7.45)
PH ARTERIAL: 7.47 (ref 7.35–7.45)
PLATELET # BLD: 284 K/UL (ref 150–400)
PMV BLD AUTO: 9.7 FL (ref 6–10)
PO2 ARTERIAL: 171.1 MMHG (ref 80–100)
PO2 ARTERIAL: 63.1 MMHG (ref 80–100)
POTASSIUM REFLEX MAGNESIUM: 5.2 MMOL/L (ref 3.4–5.1)
RBC # BLD: 3.54 M/UL (ref 3.8–5.8)
SODIUM BLD-SCNC: 133 MMOL/L (ref 136–145)
TCO2 ARTERIAL: 38.3 MMOL/L (ref 24–30)
TCO2 ARTERIAL: 42.5 MMOL/L (ref 24–30)
TOTAL PROTEIN: 5.1 G/DL (ref 6.4–8.3)
WBC # BLD: 10.4 K/UL (ref 4–11)

## 2021-07-05 PROCEDURE — 80053 COMPREHEN METABOLIC PANEL: CPT

## 2021-07-05 PROCEDURE — 1200000000 HC SEMI PRIVATE

## 2021-07-05 PROCEDURE — 94660 CPAP INITIATION&MGMT: CPT

## 2021-07-05 PROCEDURE — 6360000002 HC RX W HCPCS: Performed by: INTERNAL MEDICINE

## 2021-07-05 PROCEDURE — 2700000000 HC OXYGEN THERAPY PER DAY

## 2021-07-05 PROCEDURE — 2580000003 HC RX 258: Performed by: INTERNAL MEDICINE

## 2021-07-05 PROCEDURE — 6370000000 HC RX 637 (ALT 250 FOR IP): Performed by: PHYSICIAN ASSISTANT

## 2021-07-05 PROCEDURE — 6370000000 HC RX 637 (ALT 250 FOR IP): Performed by: NURSE PRACTITIONER

## 2021-07-05 PROCEDURE — 36600 WITHDRAWAL OF ARTERIAL BLOOD: CPT

## 2021-07-05 PROCEDURE — 94761 N-INVAS EAR/PLS OXIMETRY MLT: CPT

## 2021-07-05 PROCEDURE — 6370000000 HC RX 637 (ALT 250 FOR IP): Performed by: INTERNAL MEDICINE

## 2021-07-05 PROCEDURE — 36415 COLL VENOUS BLD VENIPUNCTURE: CPT

## 2021-07-05 PROCEDURE — 6360000002 HC RX W HCPCS: Performed by: PHYSICIAN ASSISTANT

## 2021-07-05 PROCEDURE — 85027 COMPLETE CBC AUTOMATED: CPT

## 2021-07-05 PROCEDURE — 99233 SBSQ HOSP IP/OBS HIGH 50: CPT | Performed by: INTERNAL MEDICINE

## 2021-07-05 PROCEDURE — 82803 BLOOD GASES ANY COMBINATION: CPT

## 2021-07-05 PROCEDURE — 6360000002 HC RX W HCPCS: Performed by: NURSE PRACTITIONER

## 2021-07-05 RX ORDER — MECOBALAMIN 5000 MCG
5 TABLET,DISINTEGRATING ORAL NIGHTLY PRN
Status: DISCONTINUED | OUTPATIENT
Start: 2021-07-05 | End: 2021-07-07 | Stop reason: HOSPADM

## 2021-07-05 RX ORDER — METHYLPREDNISOLONE SODIUM SUCCINATE 40 MG/ML
40 INJECTION, POWDER, LYOPHILIZED, FOR SOLUTION INTRAMUSCULAR; INTRAVENOUS ONCE
Status: COMPLETED | OUTPATIENT
Start: 2021-07-05 | End: 2021-07-05

## 2021-07-05 RX ADMIN — SERTRALINE HYDROCHLORIDE 100 MG: 50 TABLET ORAL at 14:02

## 2021-07-05 RX ADMIN — SUCRALFATE 1 G: 1 TABLET ORAL at 14:02

## 2021-07-05 RX ADMIN — METOCLOPRAMIDE 5 MG: 5 INJECTION, SOLUTION INTRAMUSCULAR; INTRAVENOUS at 13:18

## 2021-07-05 RX ADMIN — DOCUSATE SODIUM 100 MG: 100 CAPSULE ORAL at 14:00

## 2021-07-05 RX ADMIN — METOCLOPRAMIDE 5 MG: 5 INJECTION, SOLUTION INTRAMUSCULAR; INTRAVENOUS at 00:54

## 2021-07-05 RX ADMIN — HYDRALAZINE HYDROCHLORIDE 25 MG: 25 TABLET, FILM COATED ORAL at 21:27

## 2021-07-05 RX ADMIN — ENOXAPARIN SODIUM 40 MG: 40 INJECTION SUBCUTANEOUS at 08:44

## 2021-07-05 RX ADMIN — METHYLPREDNISOLONE SODIUM SUCCINATE 40 MG: 40 INJECTION, POWDER, FOR SOLUTION INTRAMUSCULAR; INTRAVENOUS at 08:44

## 2021-07-05 RX ADMIN — HYDRALAZINE HYDROCHLORIDE 25 MG: 25 TABLET, FILM COATED ORAL at 14:00

## 2021-07-05 RX ADMIN — METOPROLOL TARTRATE 25 MG: 25 TABLET, FILM COATED ORAL at 14:01

## 2021-07-05 RX ADMIN — CEFTRIAXONE 1000 MG: 1 INJECTION, POWDER, FOR SOLUTION INTRAMUSCULAR; INTRAVENOUS at 13:18

## 2021-07-05 RX ADMIN — SUCRALFATE 1 G: 1 TABLET ORAL at 17:30

## 2021-07-05 RX ADMIN — Medication 5 MG: at 20:28

## 2021-07-05 RX ADMIN — POLYETHYLENE GLYCOL (3350) 17 G: 17 POWDER, FOR SOLUTION ORAL at 14:01

## 2021-07-05 RX ADMIN — LOSARTAN POTASSIUM 50 MG: 50 TABLET, FILM COATED ORAL at 14:01

## 2021-07-05 RX ADMIN — METOPROLOL TARTRATE 25 MG: 25 TABLET, FILM COATED ORAL at 20:28

## 2021-07-05 RX ADMIN — DILTIAZEM HYDROCHLORIDE 120 MG: 120 CAPSULE, COATED, EXTENDED RELEASE ORAL at 13:59

## 2021-07-05 RX ADMIN — SPIRONOLACTONE 25 MG: 25 TABLET, FILM COATED ORAL at 14:02

## 2021-07-05 RX ADMIN — LOSARTAN POTASSIUM 50 MG: 50 TABLET, FILM COATED ORAL at 20:28

## 2021-07-05 RX ADMIN — METOCLOPRAMIDE 5 MG: 5 INJECTION, SOLUTION INTRAMUSCULAR; INTRAVENOUS at 05:39

## 2021-07-05 RX ADMIN — METOCLOPRAMIDE 5 MG: 5 INJECTION, SOLUTION INTRAMUSCULAR; INTRAVENOUS at 17:30

## 2021-07-05 RX ADMIN — ONDANSETRON HYDROCHLORIDE 4 MG: 2 INJECTION, SOLUTION INTRAMUSCULAR; INTRAVENOUS at 05:38

## 2021-07-05 RX ADMIN — Medication 1 CAPSULE: at 14:00

## 2021-07-05 NOTE — PROGRESS NOTES
Pt states she is reversed and doesnt want to spew all over us. I asked if she felt as though she was going to throw up she shook her head yes and said reverse.

## 2021-07-05 NOTE — FLOWSHEET NOTE
07/04/21 2036   Assessment   Charting Type Shift assessment   Neurological   Neuro (WDL) WDL   Level of Consciousness Alert (0)   Stefany Coma Scale   Eye Opening 4   Best Verbal Response 4   Best Motor Response 6   Stefany Coma Scale Score 14   HEENT   HEENT (WDL) WDL   Respiratory   Respiratory (WDL) WDL   Breath Sounds   Right Upper Lobe Clear   Right Middle Lobe Clear   Right Lower Lobe Diminished   Left Upper Lobe Clear   Left Lower Lobe Diminished   Cough/Sputum   Sputum How Obtained None   Cardiac   Cardiac (WDL) X   Cardiac Regularity Regular   Cardiac Rhythm NSR  (frequent SVPVC's HX afib)   Cardiac Monitor   Telemetry Monitor On Yes   Telemetry Audible Yes   Telemetry Alarms Set Yes   Telemetry Box Number [id number]   Telemetry Monitor Alarm Parameters MX40-12   Gastrointestinal   Abdominal (WDL) X   Hernia Other (Comment)  (hiatal)   Abdomen Inspection Soft   Tenderness Nontender   RUQ Bowel Sounds Active   LUQ Bowel Sounds Active   RLQ Bowel Sounds Active   LLQ Bowel Sounds Active   Peripheral Vascular   Peripheral Vascular (WDL) WDL   Edema Right upper extremity; Left upper extremity;Right lower extremity; Left lower extremity;Periorbital   RUE Edema Trace   LUE Edema Trace   RLE Edema Trace   LLE Edema Trace   Skin Color/Condition   Skin Color/Condition (WDL) X   Skin Color Pale   Skin Condition/Temp Dry; Warm   Skin Integrity   Skin Integrity (WDL) WDL   Musculoskeletal   Musculoskeletal (WDL) X   RUE Weakness   LUE Weakness   RL Extremity Weakness   LL Extremity Weakness   Genitourinary   Genitourinary (WDL) WDL   Urine Assessment   Incontinence No   Anus/Rectum   Anus/Rectum (WDL) X   Evaluation Other (Comment)  (prolapsed at times)   Psychosocial   Psychosocial (WDL) X   Patient Behaviors Anxious     Pt is sitting up in the bed with the daughter, she takes her medication as long as daughter is here. She stated when asked how she feels \"I'm dying\" But I'm opposite. \" Daughter says its opposite syndrome, feeling opposite of how feel. \" Pt is verpale and very flat affect.

## 2021-07-05 NOTE — PROGRESS NOTES
Progress Note      Subjective:   Chief complaint:   General debility  SOA    Interval History:   Patient seen and examined this morning. She was breathing comfortably on the BiPAP. She is confused and insists she is actively dying. Family members at bedside. Dr. Stacey Bishop had an extensive goals of care discussion with patient's family. Review of systems:   Unable to fully obtain secondary to patient's confusion    Past medical history, surgical history, family history and social history reviewed and unchanged compared to H&P earlier this admission. Medications:   Scheduled Meds:   cefTRIAXone (ROCEPHIN) IV  1,000 mg Intravenous Q24H    furosemide  20 mg Oral Once    metoprolol tartrate  25 mg Oral BID    CENTRUM/CERTA-GLADYS with minerals oral  15 mL Oral Daily    spironolactone  25 mg Oral Daily    dilTIAZem  120 mg Oral Daily    hydrALAZINE  25 mg Oral 3 times per day    metoclopramide  5 mg Intravenous Q6H    losartan  50 mg Oral BID    linaclotide  145 mcg Oral QAM AC    pantoprazole  40 mg Oral QAM AC    sucralfate  1 g Oral 4 times per day    sertraline  100 mg Oral Daily    enoxaparin  40 mg Subcutaneous Daily    polyethylene glycol  17 g Oral Daily    Vitamin D  2,000 Units Oral Daily    docusate sodium  100 mg Oral Daily    estradiol  1 g Vaginal Daily    lactobacillus  1 capsule Oral Daily     Continuous Infusions:    Objective:     Vital Signs  Temp: 97.5 °F (36.4 °C)  Pulse: 74  Resp: 16  BP: (!) 129/56  SpO2: 97 %  O2 Device: PAP (positive airway pressure)  O2 Flow Rate (L/min): 3 L/min    Vital signs reviewed in electronic charts. Physical exam  Constitutional:  Well developed, thin, ill-appearing, elderly female lying in bed in no acute distress on BiPAP. Eyes:   conjunctiva normal, no scleral icterus  HENT:  Atraumatic, external ears normal, external nose/nares normal, oropharynx moist, no pharyngeal exudates. Neck:  Supple. No JVD or thyromegaly.   Respiratory:  No respiratory distress, normal breath sounds, no rales, no wheezing. Cardiovascular: Irregularly irregular, no murmurs, no gallops, no rubs. GI:  Soft, nondistended, normal bowel sounds, nontender, no organomegaly, no mass. :  No costovertebral angle tenderness. Musculoskeletal: Trace bilateral lower extremity edema, no tenderness, no obvious deformities. Patient is moving all extremities. Integument:  Well hydrated, no rash. Lymphatic:  No cervical or axillary lymphadenopathy noted. Neurologic:  Alert & oriented x 1,  no focal deficits noted. Strength is equal throughout. Psychiatric:  Speech and behavior appropriate. Results:     Lab Results   Component Value Date    WBC 10.4 07/05/2021    HGB 11.0 (L) 07/05/2021    HCT 36.4 (L) 07/05/2021    .8 (H) 07/05/2021     07/05/2021       Lab Results   Component Value Date     07/05/2021    K 5.2 07/05/2021    CL 95 07/05/2021    CO2 40 07/05/2021    BUN 32 07/05/2021    CREATININE <0.5 07/05/2021    GLUCOSE 114 07/05/2021    CALCIUM 9.8 07/05/2021        Assessment and Plan: Active Hospital Problems    Diagnosis Date Noted    PAF (paroxysmal atrial fibrillation) ([facility] [address] [I48.0]  -Continue beta-blocker and Cardizem  -Has been evaluated by cardiology   07/01/2021    Gastroesophageal reflux disease without esophagitis [K21.9]  -Continue PPI   06/29/2021    Rectal prolapse [K62.3]  -Reduced on admission; has not recurred   06/29/2021    Hernia, hiatal [K44.9]  -Large hiatal hernia noted on imaging.   Patient has deferred surgical evaluation.  -Continue PPI, multiple antinausea medications and Reglan   06/29/2021    PAC (premature atrial contraction) [I49.1]   06/29/2021    Constipation [K59.00]  -Continue bowel regimen   06/27/2021    Nausea [R11.0]  -Suspect secondary to large hiatal hernia  -Continue PPI, Carafate and as needed antiemetics   06/27/2021    Weakness [R53.1]  -PT OT if willing and able although patient currently requiring BiPAP continuously  -Patient insist she is dying; family considering options   06/26/2021    Hyponatremia [E87.1]  -Suspect secondary to poor p.o. at time of admission; sodium levels have since improved with better p.o. intake and fluid hydration   06/26/2021    B12 deficiency [E53.8]  -Continue replacement   05/17/2016    Essential hypertension [I10]  -Continue Cozaar, hydralazine, diltiazem and metoprolol   11/24/2015    Anxiety [F41.9]  -Continue home Zoloft and Ativan   08/01/2014    Pulmonary HTN (HCC) [I27.20]    Acute hypoxic respiratory failure  -Suspected to be secondary to intrathoracic stomach and reflux/aspiration pneumonitis  -Speech therapy consulted for possible barium swallow when available  -O2 sats stable on BiPAP currently    UTI  -UA suggestive of infection; urine culture pending  -Continue Rocephin 03/22/2013     Patient was seen and examined by Dr. Stan Maldonado and plan of care reviewed.       Electronically signed by NANY Pride on 7/5/2021 at 11:35 AM

## 2021-07-05 NOTE — PROGRESS NOTES
Spoke with daughter, she's going to call brother to see if they decide to place her on BIPAP or not.

## 2021-07-05 NOTE — CARE COORDINATION
Skilled OT services held per nursing request. Will attempt again at later date.    Eugene Rosales, OTR/L

## 2021-07-05 NOTE — PROGRESS NOTES
Pt has no output this shift or last. She has a Pure wick with very minimal amount in the canister. Pt told the aids earlier she feels like she is going to bust. Bladder scanned pt, she only had 136 mls on scan.

## 2021-07-05 NOTE — PROGRESS NOTES
Asked pt if she was okay or was feeling bad. She stated \" I'm opposite. \" When posotioning her for bladder scan she said she would choke. I asked if she would need something for nausea, she stated no. I placed her back to position she was comfortable, she stated \" I'm opposite. \"

## 2021-07-06 ENCOUNTER — APPOINTMENT (OUTPATIENT)
Dept: GENERAL RADIOLOGY | Facility: HOSPITAL | Age: 86
DRG: 643 | End: 2021-07-06
Attending: INTERNAL MEDICINE
Payer: MEDICARE

## 2021-07-06 LAB
A/G RATIO: 1.4 (ref 0.8–2)
ALBUMIN SERPL-MCNC: 3.3 G/DL (ref 3.4–4.8)
ALP BLD-CCNC: 70 U/L (ref 25–100)
ALT SERPL-CCNC: 114 U/L (ref 4–36)
ANION GAP SERPL CALCULATED.3IONS-SCNC: 0 MMOL/L (ref 3–16)
AST SERPL-CCNC: 42 U/L (ref 8–33)
BASE EXCESS ARTERIAL: 12.6 MMOL/L (ref -3–3)
BILIRUB SERPL-MCNC: 0.4 MG/DL (ref 0.3–1.2)
BUN BLDV-MCNC: 33 MG/DL (ref 6–20)
CALCIUM SERPL-MCNC: 10.2 MG/DL (ref 8.5–10.5)
CHLORIDE BLD-SCNC: 93 MMOL/L (ref 98–107)
CO2: 40 MMOL/L (ref 20–30)
CREAT SERPL-MCNC: <0.5 MG/DL (ref 0.4–1.2)
FIO2: 0.28 %
GFR AFRICAN AMERICAN: >59
GFR NON-AFRICAN AMERICAN: >60
GLOBULIN: 2.3 G/DL
GLUCOSE BLD-MCNC: 120 MG/DL (ref 74–106)
HCO3 ARTERIAL: 39.6 MMOL/L (ref 22–26)
HCT VFR BLD CALC: 40 % (ref 37–47)
HEMOGLOBIN: 12.4 G/DL (ref 11.5–16.5)
MCH RBC QN AUTO: 30.8 PG (ref 27–32)
MCHC RBC AUTO-ENTMCNC: 31 G/DL (ref 31–35)
MCV RBC AUTO: 99.5 FL (ref 80–100)
O2 SAT, ARTERIAL: 96.9 %
O2 THERAPY: ABNORMAL
PCO2 ARTERIAL: 63.7 MMHG (ref 35–45)
PDW BLD-RTO: 12.9 % (ref 11–16)
PH ARTERIAL: 7.41 (ref 7.35–7.45)
PLATELET # BLD: 327 K/UL (ref 150–400)
PMV BLD AUTO: 9 FL (ref 6–10)
PO2 ARTERIAL: 91.4 MMHG (ref 80–100)
POTASSIUM REFLEX MAGNESIUM: 5 MMOL/L (ref 3.4–5.1)
RBC # BLD: 4.02 M/UL (ref 3.8–5.8)
SARS-COV-2, NAAT: NOT DETECTED
SODIUM BLD-SCNC: 133 MMOL/L (ref 136–145)
TCO2 ARTERIAL: 41.6 MMOL/L (ref 24–30)
TOTAL PROTEIN: 5.6 G/DL (ref 6.4–8.3)
WBC # BLD: 15 K/UL (ref 4–11)

## 2021-07-06 PROCEDURE — 36600 WITHDRAWAL OF ARTERIAL BLOOD: CPT

## 2021-07-06 PROCEDURE — 1200000000 HC SEMI PRIVATE

## 2021-07-06 PROCEDURE — 36415 COLL VENOUS BLD VENIPUNCTURE: CPT

## 2021-07-06 PROCEDURE — 94660 CPAP INITIATION&MGMT: CPT

## 2021-07-06 PROCEDURE — 6370000000 HC RX 637 (ALT 250 FOR IP): Performed by: INTERNAL MEDICINE

## 2021-07-06 PROCEDURE — 87635 SARS-COV-2 COVID-19 AMP PRB: CPT

## 2021-07-06 PROCEDURE — 2580000003 HC RX 258: Performed by: INTERNAL MEDICINE

## 2021-07-06 PROCEDURE — 82803 BLOOD GASES ANY COMBINATION: CPT

## 2021-07-06 PROCEDURE — 80053 COMPREHEN METABOLIC PANEL: CPT

## 2021-07-06 PROCEDURE — 97802 MEDICAL NUTRITION INDIV IN: CPT

## 2021-07-06 PROCEDURE — 6360000002 HC RX W HCPCS: Performed by: PHYSICIAN ASSISTANT

## 2021-07-06 PROCEDURE — 6360000002 HC RX W HCPCS: Performed by: INTERNAL MEDICINE

## 2021-07-06 PROCEDURE — 6360000002 HC RX W HCPCS: Performed by: NURSE PRACTITIONER

## 2021-07-06 PROCEDURE — 2700000000 HC OXYGEN THERAPY PER DAY

## 2021-07-06 PROCEDURE — 6370000000 HC RX 637 (ALT 250 FOR IP): Performed by: NURSE PRACTITIONER

## 2021-07-06 PROCEDURE — 6370000000 HC RX 637 (ALT 250 FOR IP): Performed by: PHYSICIAN ASSISTANT

## 2021-07-06 PROCEDURE — 92610 EVALUATE SWALLOWING FUNCTION: CPT

## 2021-07-06 PROCEDURE — 99232 SBSQ HOSP IP/OBS MODERATE 35: CPT | Performed by: INTERNAL MEDICINE

## 2021-07-06 PROCEDURE — 85027 COMPLETE CBC AUTOMATED: CPT

## 2021-07-06 RX ORDER — LIDOCAINE 4 G/G
1 PATCH TOPICAL DAILY
Status: DISCONTINUED | OUTPATIENT
Start: 2021-07-06 | End: 2021-07-07 | Stop reason: HOSPADM

## 2021-07-06 RX ADMIN — METOPROLOL TARTRATE 25 MG: 25 TABLET, FILM COATED ORAL at 08:42

## 2021-07-06 RX ADMIN — HYDRALAZINE HYDROCHLORIDE 25 MG: 25 TABLET, FILM COATED ORAL at 20:29

## 2021-07-06 RX ADMIN — LOSARTAN POTASSIUM 50 MG: 50 TABLET, FILM COATED ORAL at 08:42

## 2021-07-06 RX ADMIN — DOCUSATE SODIUM 100 MG: 100 CAPSULE ORAL at 08:42

## 2021-07-06 RX ADMIN — ALPRAZOLAM 0.25 MG: 0.25 TABLET ORAL at 20:29

## 2021-07-06 RX ADMIN — Medication 2000 UNITS: at 08:42

## 2021-07-06 RX ADMIN — ENOXAPARIN SODIUM 40 MG: 40 INJECTION SUBCUTANEOUS at 08:41

## 2021-07-06 RX ADMIN — DILTIAZEM HYDROCHLORIDE 120 MG: 120 CAPSULE, COATED, EXTENDED RELEASE ORAL at 08:42

## 2021-07-06 RX ADMIN — HYDRALAZINE HYDROCHLORIDE 25 MG: 25 TABLET, FILM COATED ORAL at 08:44

## 2021-07-06 RX ADMIN — POLYETHYLENE GLYCOL (3350) 17 G: 17 POWDER, FOR SOLUTION ORAL at 08:42

## 2021-07-06 RX ADMIN — CEFTRIAXONE 1000 MG: 1 INJECTION, POWDER, FOR SOLUTION INTRAMUSCULAR; INTRAVENOUS at 13:30

## 2021-07-06 RX ADMIN — METOCLOPRAMIDE 5 MG: 5 INJECTION, SOLUTION INTRAMUSCULAR; INTRAVENOUS at 17:29

## 2021-07-06 RX ADMIN — LOSARTAN POTASSIUM 50 MG: 50 TABLET, FILM COATED ORAL at 20:29

## 2021-07-06 RX ADMIN — Medication 1 CAPSULE: at 08:42

## 2021-07-06 RX ADMIN — HYDRALAZINE HYDROCHLORIDE 25 MG: 25 TABLET, FILM COATED ORAL at 13:30

## 2021-07-06 RX ADMIN — METOPROLOL TARTRATE 25 MG: 25 TABLET, FILM COATED ORAL at 20:29

## 2021-07-06 RX ADMIN — SERTRALINE HYDROCHLORIDE 100 MG: 50 TABLET ORAL at 08:42

## 2021-07-06 RX ADMIN — SPIRONOLACTONE 25 MG: 25 TABLET, FILM COATED ORAL at 08:42

## 2021-07-06 RX ADMIN — METOCLOPRAMIDE 5 MG: 5 INJECTION, SOLUTION INTRAMUSCULAR; INTRAVENOUS at 12:08

## 2021-07-06 RX ADMIN — PANTOPRAZOLE SODIUM 40 MG: 40 TABLET, DELAYED RELEASE ORAL at 08:44

## 2021-07-06 RX ADMIN — SUCRALFATE 1 G: 1 TABLET ORAL at 12:08

## 2021-07-06 RX ADMIN — SUCRALFATE 1 G: 1 TABLET ORAL at 17:29

## 2021-07-06 RX ADMIN — METOCLOPRAMIDE 5 MG: 5 INJECTION, SOLUTION INTRAMUSCULAR; INTRAVENOUS at 05:35

## 2021-07-06 RX ADMIN — METOCLOPRAMIDE 5 MG: 5 INJECTION, SOLUTION INTRAMUSCULAR; INTRAVENOUS at 00:13

## 2021-07-06 NOTE — PROGRESS NOTES
Progress Note      Subjective:   Chief complaint:   Declining functional status    Interval History:   Pt seen and examined this afternoon. Daughter at bedside. Pt refusing most of her medications. Also, refused bipap last night. Most recent ABG on 2L NC today with improvement. Pt confused and still states she is dying. Daughter understands pt's long term prognosis is poor and agrees pt has declined in the past 2 weeks. Daughter agreeable for home with hospice. Hospice referral sent. Review of systems:   Unable to obtain given pt's MS    Past medical history, surgical history, family history and social history reviewed and unchanged compared to H&P earlier this admission. Medications:   Scheduled Meds:   lidocaine  1 patch Transdermal Daily    cefTRIAXone (ROCEPHIN) IV  1,000 mg Intravenous Q24H    furosemide  20 mg Oral Once    metoprolol tartrate  25 mg Oral BID    CENTRUM/CERTA-GLADYS with minerals oral  15 mL Oral Daily    spironolactone  25 mg Oral Daily    dilTIAZem  120 mg Oral Daily    hydrALAZINE  25 mg Oral 3 times per day    metoclopramide  5 mg Intravenous Q6H    losartan  50 mg Oral BID    linaclotide  145 mcg Oral QAM AC    pantoprazole  40 mg Oral QAM AC    sucralfate  1 g Oral 4 times per day    sertraline  100 mg Oral Daily    enoxaparin  40 mg Subcutaneous Daily    polyethylene glycol  17 g Oral Daily    Vitamin D  2,000 Units Oral Daily    docusate sodium  100 mg Oral Daily    estradiol  1 g Vaginal Daily    lactobacillus  1 capsule Oral Daily     Continuous Infusions:    Objective:     Vital Signs  Temp: 97.5 °F (36.4 °C)  Pulse: 93  Resp: 18  BP: (!) 153/99  SpO2: 98 %  O2 Device: Nasal cannula  O2 Flow Rate (L/min): 2.5 L/min    Vital signs reviewed in electronic charts. Physical exam  Constitutional:  Well developed, thin, ill-appearing, elderly female lying in bed in no acute distress on 2.5L NC.   Eyes:   conjunctiva normal, no scleral icterus  HENT:  Atraumatic, external ears normal, external nose/nares normal, oropharynx moist, no pharyngeal exudates. Neck:  Supple. No JVD or thyromegaly. Respiratory:  No respiratory distress, normal breath sounds, no rales, no wheezing. On 2.5L NC. Cardiovascular: Irregularly irregular, no murmurs, no gallops, no rubs. GI:  Soft, nondistended, normal bowel sounds, nontender, no organomegaly, no mass. :  No costovertebral angle tenderness. Musculoskeletal: Trace bilateral lower extremity edema, no tenderness, no obvious deformities. Patient is moving all extremities. Integument:  Well hydrated, no rash. Lymphatic:  No cervical or axillary lymphadenopathy noted. Neurologic:  Alert & oriented x 1,  no focal deficits noted. Strength is equal throughout. Psychiatric:  Speech and behavior appropriate. Results:     Lab Results   Component Value Date    WBC 15.0 (H) 07/06/2021    HGB 12.4 07/06/2021    HCT 40.0 07/06/2021    MCV 99.5 07/06/2021     07/06/2021       Lab Results   Component Value Date     07/06/2021    K 5.0 07/06/2021    CL 93 07/06/2021    CO2 40 07/06/2021    BUN 33 07/06/2021    CREATININE <0.5 07/06/2021    GLUCOSE 120 07/06/2021    CALCIUM 10.2 07/06/2021        Assessment and Plan: Active Hospital Problems     Diagnosis Date Noted    PAF (paroxysmal atrial fibrillation) ([facility]) [I48.0]  -Continue beta-blocker and Cardizem  -Has been evaluated by cardiology    07/01/2021    Gastroesophageal reflux disease without esophagitis [K21.9]  -Continue PPI    06/29/2021    Rectal prolapse [K62.3]  -Reduced on admission; has not recurred    06/29/2021    Hernia, hiatal [K44.9]  -Large hiatal hernia in chest noted on imaging.   Patient has deferred surgical evaluation.  -Continue PPI, multiple antinausea medications and Reglan    06/29/2021    PAC (premature atrial contraction) [I49.1]    06/29/2021    Constipation [K59.00]  -Continue bowel regimen    06/27/2021    Nausea [R11.0]  -Suspect secondary to large hiatal hernia  -Continue PPI, Carafate and as needed antiemetics  -per RN, pt often refusing medications    06/27/2021    Weakness [R53.1]  -PT OT if willing and able although patient currently requiring BiPAP continuously  -Patient insist she is dying; family has elected for Home with hospice  -hospice consulted    06/26/2021    Hyponatremia [E87.1]  -Suspect secondary to poor p.o. at time of admission; sodium levels have since improved with better p.o. intake and fluid hydration    06/26/2021    B12 deficiency [E53.8]  -Continue replacement    05/17/2016    Essential hypertension [I10]  -Continue Cozaar, hydralazine, diltiazem and metoprolol if pt agreeable    11/24/2015    Anxiety [F41.9]  -Continue home Zoloft and Ativan    08/01/2014    Pulmonary HTN ([facility] [address] [I27.20]     Acute hypoxic respiratory failure  -Suspected to be secondary to intrathoracic stomach and reflux/aspiration pneumonitis  -Speech therapy consulted with recs for dysphagia pureed diet with thin liquids  -initially required bipap; now O2 stable on 2.5 L NC     UTI, ruled out  -UA suggestive of infection; however, urine culture c/w mixed skin kit. Covered with IV rocephin until UCx was reported on 7/6. DC Rocephin. Patient was seen and examined by Dr. Anila Rose and plan of care reviewed.       Electronically signed by NANY Silva on 7/6/2021 at 1:31 PM

## 2021-07-06 NOTE — CARE COORDINATION
Attempted to provide skilled OT services; pt refused participation in interventions. Will attempt again at later date.  Loney Aase, OTR/L

## 2021-07-06 NOTE — CONSULTS
Comprehensive Nutrition Assessment    Type and Reason for Visit:  Consult    Nutrition Recommendations/Plan: Will start ONS TID and gave information about the Pureed diet and tips on how to get more calories and protein in foods when patient is willing to eat. Nutrition Assessment:  Pt admitted with hyponatremia, poor intakes and appetite. Pt at moderate-high risk for ongoing nutritional compromise r/t intakes. Will offer ONS and nutrition counseling to family. Malnutrition Assessment:  Malnutrition Status: At risk for malnutrition (Comment)    Context:  Acute Illness     Findings of the 6 clinical characteristics of malnutrition:  Energy Intake:  1 - 75% or less of estimated energy requirements for 7 or more days  Weight Loss:  Unable to assess     Body Fat Loss:  No significant body fat loss     Muscle Mass Loss:  Unable to assess    Fluid Accumulation:  1 - Mild Extremities   Strength:       Estimated Daily Nutrient Needs:  Energy (kcal):  5827-1951; Weight Used for Energy Requirements:  Current     Protein (g):  54-65 (1-1.2 gm/kg cbw); Weight Used for Protein Requirements:  Current        Fluid (ml/day):  1183-8526; Method Used for Fluid Requirements:  1 ml/kcal      Nutrition Related Findings:  Pt is considered underweight for age, has trace edema in LUE and BLE, +1 nonpitting edema inRUE. PMH: hypertension, b12 deficiency, GERD, hiatl hernia. Has poor intakes. MVI and Vit D ordered. Wounds:  None       Current Nutrition Therapies:    ADULT DIET;  Dysphagia - Pureed  Adult Oral Nutrition Supplement; Standard High Calorie/High Protein Oral Supplement    Anthropometric Measures:  · Height: 5' 2\" (157.5 cm)  · Current Body Weight: 120 lb (54.4 kg)   · Admission Body Weight:      · Usual Body Weight:       · Ideal Body Weight: 110 lbs; % Ideal Body Weight 109.1 %   · BMI: 21.9  · Adjusted Body Weight:  ; No Adjustment   · Adjusted BMI:      · BMI Categories: Underweight (BMI less than 22) age over 72       Nutrition Diagnosis:   · Predicted inadequate energy intake related to cognitive or neurological impairment, inadequate protein-energy intake as evidenced by BMI, intake 0-25%      Nutrition Interventions:   Food and/or Nutrient Delivery:  Continue Current Diet, Modify Oral Nutrition Supplement  Nutrition Education/Counseling:  Education completed   Coordination of Nutrition Care:  Continue to monitor while inpatient, Speech Therapy    Goals:  to meet est needs for age/condition       Nutrition Monitoring and Evaluation:   Behavioral-Environmental Outcomes:  Readiness for Change   Food/Nutrient Intake Outcomes:  Food and Nutrient Intake, Supplement Intake  Physical Signs/Symptoms Outcomes:  Biochemical Data, Chewing or Swallowing, Fluid Status or Edema, Skin, Weight     Discharge Planning:    Continue Oral Nutrition Supplement, Continue current diet     Electronically signed by Hill Ledezma, MS, RD, LD on 7/6/21 at 1:45 PM EDT

## 2021-07-06 NOTE — FLOWSHEET NOTE
07/06/21 0900   Assessment   Charting Type Shift assessment   Neurological   Neuro (WDL) WDL   Level of Consciousness Alert (0)   Stefany Coma Scale   Eye Opening 4   Best Verbal Response 4   Best Motor Response 6   Stefany Coma Scale Score 14   HEENT   HEENT (WDL) WDL   Respiratory   Respiratory (WDL) WDL   Breath Sounds   Right Upper Lobe Clear   Right Middle Lobe Clear   Right Lower Lobe Diminished   Left Upper Lobe Clear   Left Lower Lobe Diminished   Cough/Sputum   Sputum How Obtained None   Cardiac   Cardiac (WDL) X   Cardiac Regularity Regular   Cardiac Rhythm NSR  ( HX afib)   Cardiac Monitor   Telemetry Monitor On Yes   Telemetry Audible Yes   Telemetry Alarms Set Yes   Telemetry Box Number [id number]   Telemetry Monitor Alarm Parameters MX40-12   Gastrointestinal   Abdominal (WDL) X   Hernia Other (Comment)  (hiatal)   Abdomen Inspection Soft   Tenderness Nontender   RUQ Bowel Sounds Active   LUQ Bowel Sounds Active   RLQ Bowel Sounds Active   LLQ Bowel Sounds Active   Peripheral Vascular   Peripheral Vascular (WDL) WDL   Edema Right upper extremity; Left upper extremity;Right lower extremity; Left lower extremity;Periorbital   RUE Edema +1;Non-pitting   LUE Edema Trace   RLE Edema Trace   LLE Edema Trace   Skin Color/Condition   Skin Color/Condition (WDL) X   Skin Color Pale   Skin Condition/Temp Dry; Warm   Skin Integrity   Skin Integrity (WDL) WDL   Musculoskeletal   Musculoskeletal (WDL) X   RUE Weakness   LUE Weakness   RL Extremity Weakness   LL Extremity Weakness   Genitourinary   Genitourinary (WDL) WDL   Urine Assessment   Incontinence Yes   Anus/Rectum   Anus/Rectum (WDL) X   Evaluation Other (Comment)  (prolapsed at times)   Psychosocial   Psychosocial (WDL) X   Patient Behaviors Uncooperative   Critical value of CO2 = 40 received. Verified and informed PA. Lidocaine patch placed on back.

## 2021-07-06 NOTE — PROGRESS NOTES
Speech Language Pathology  Facility/Department: [facility] MED SURG   CLINICAL BEDSIDE SWALLOW EVALUATION    NAME: Tarun Trevino  : 10/14/1930  MRN: [id number]    ADMISSION DATE: 2021  ADMITTING DIAGNOSIS: has Degenerative arthritis of hip; Hyperlipidemia; Pulmonary HTN ([facility] [address] Anxiety; Vitamin D deficiency; Subarachnoid hemorrhage ([address] Essential hypertension; Lung nodule; B12 deficiency; Degenerative disc disease, lumbar; Spinal stenosis, lumbar; Weakness; Hyponatremia; Constipation; Nausea; Gastroesophageal reflux disease without esophagitis; Rectal prolapse; Hernia, hiatal; PAC (premature atrial contraction); PAF (paroxysmal atrial fibrillation) ([facility] [address] Urinary tract infection without hematuria; and Acute respiratory failure with hypoxia and hypercapnia (HCC) on their problem list.  ONSET DATE: 2021    Recent Chest Xray/CT of Chest: (2021)     Impression       1. Moderate bilateral basilar airspace disease. Date of Eval: 2021  Evaluating Therapist: JAMEY Davenport    Current Diet level:  Current Diet : Regular  Current Liquid Diet : Thin    Primary Complaint  Patient Complaint: Patient stated \"I'm dying\". Patient's daughter reported patient prefers smoothies and pureed foods. Pain:  Pain Assessment  Pain Assessment: 0-10  Pain Level: 2    Reason for Referral  Tarun Trevino was referred for a bedside swallow evaluation to assess the efficiency of her swallow function, identify signs and symptoms of aspiration and make recommendations regarding safe dietary consistencies, effective compensatory strategies, and safe eating environment. Impression  Dysphagia Diagnosis: Moderate oral stage dysphagia;Mild pharyngeal stage dysphagia  Dysphagia Impression : Min-mod oropharyngeal phase dysphagia  Dysphagia Outcome Severity Scale: Level 4: Mild moderate dysphagia- Intermittent supervision/cueing.  One - two diet consistencies restricted     Treatment Plan  Requires SLP Intervention: No  Duration/Frequency of Treatment: N/A - Skilled ST services not indicated at this time. D/C Recommendations: 24 hour supervision/assistance     Recommended Diet and Intervention  Diet Solids Recommendation: Dysphagia Pureed (Dysphagia I)  Liquid Consistency Recommendation: Thin  Recommended Form of Meds: Crushed in puree as able  Recommendations: Assist feed     Compensatory Swallowing Strategies  Compensatory Swallowing Strategies: Alternate solids and liquids; Check for pocketing of food on the Left;Lingual sweep; No straws;Small bites/sips; Total feed;Assist feed; Check for pocketing of food on the Right;Eat/Feed slowly; External pacing;Remain upright for 30-45 minutes after meals;Swallow 2 times per bite/sip;Upright as possible for all oral intake    Treatment/Goals  Short-term Goals  Timeframe for Short-term Goals: N/A - Skilled ST services not indicated at this time. Long-term Goals  Timeframe for Long-term Goals: N/A - Skilled ST services not indicated at this time. General  Chart Reviewed: Yes  Subjective  Subjective: Patient upright in bed; pleasant and agreeable to ST eval. Patient's daughter at bedside, who served as . Behavior/Cognition: Alert; Cooperative;Pleasant mood;Confused  Communication Observation: Functional  Follows Directions: Simple  Dentition: Some missing teeth  Patient Positioning: Upright in bed  Baseline Vocal Quality: Weak  Volitional Cough: Strong  Prior Dysphagia History: None reported or indicated  Consistencies Administered: Dysphagia Pureed (Dysphagia I); Dysphagia Soft and Bite-Sized (Dysphagia III)    Vision/Hearing  Vision  Vision: Within Functional Limits  Hearing  Hearing: Within functional limits    Oral Motor Deficits  Oral/Motor  Oral Motor: Exceptions to [facility]  Labial Strength: Reduced  Labial Coordination: Reduced  Lingual Strength: Reduced  Lingual Coordination: Reduced    Oral Phase Dysfunction  Oral Phase  Oral Phase: Exceptions  Oral Phase Dysfunction  Impaired Mastication: Soft Solid  Decreased Anterior to Posterior Transit: Soft solid;Puree  Lingual/Palatal Residue: Soft solid  Oral Phase  Oral Phase - Comment: With min trial of soft and bite sized cookie, patient demonstrated significant delayed oral prep time, eventually allowing bolus to dissolve (refusing to expel bolus). Patient required multiple cues to swallow and lingual residue remained with cues to use liquid wash/reswallow/lingual sweep to clear. Patient consumed puree texture with min reduced oral prep time, however, cleared effectively. Indicators of Pharyngeal Phase Dysfunction   Pharyngeal Phase  Pharyngeal Phase: WFL  Pharyngeal Phase   Pharyngeal: Patient demonstrated min reduced laryngeal strength, however, no overt s/sx aspiration indicated. Prognosis  Prognosis  Prognosis for safe diet advancement: good  Barriers to reach goals: age;cognitive deficits  Barriers/Prognosis Comment: Good for recommended diet with daughter assisting with feeding and use of aspiration precaution guidelines and safe swallow strategies. .  Individuals consulted  Consulted and agree with results and recommendations: Patient; Family member  Family member consulted: Daughter    Education  Patient Education: Aspiration precaution guidelines; safe swallow strategies. Patient Education Response: Verbalizes understanding;Demonstrated understanding;Needs reinforcement  Safety Devices in place: Yes  Type of devices: All fall risk precautions in place; Bed alarm in place;Call light within reach; Patient at risk for falls; Left in bed;Nurse notified; Other (comment) (Daughter at bedside)       Therapy Time  SLP Individual Minutes  Time In: 1005  Time Out: [id number] Toym Zimmerman  Minutes: [address]  7/6/2021 10:57 AM

## 2021-07-06 NOTE — PLAN OF CARE
Problem: SAFETY  Goal: Free from accidental physical injury  Outcome: Ongoing     Problem: DAILY CARE  Goal: Daily care needs are met  Outcome: Ongoing     Problem: DISCHARGE BARRIERS  Goal: Patient's continuum of care needs are met  Outcome: Ongoing

## 2021-07-06 NOTE — FLOWSHEET NOTE
07/05/21 2030   Assessment   Charting Type Shift assessment   Neurological   Neuro (WDL) WDL   Level of Consciousness Alert (0)   Stefany Coma Scale   Eye Opening 4   Best Verbal Response 4   Best Motor Response 6   Stefany Coma Scale Score 14   HEENT   HEENT (WDL) WDL   Respiratory   Respiratory (WDL) WDL   Breath Sounds   Right Upper Lobe Clear   Right Middle Lobe Clear   Right Lower Lobe Diminished   Left Upper Lobe Clear   Left Lower Lobe Diminished   Cough/Sputum   Sputum How Obtained None   Cardiac   Cardiac (WDL) X   Cardiac Regularity Regular   Cardiac Rhythm NSR  ( HX afib)   Rhythm Interpretation   Pulse 100   Cardiac Monitor   Telemetry Monitor On Yes   Telemetry Audible Yes   Telemetry Alarms Set Yes   Telemetry Box Number [id number]   Telemetry Monitor Alarm Parameters MX40-12   Gastrointestinal   Abdominal (WDL) X   Hernia Other (Comment)  (hiatal)   Abdomen Inspection Soft   Tenderness Nontender   RUQ Bowel Sounds Active   LUQ Bowel Sounds Active   RLQ Bowel Sounds Active   LLQ Bowel Sounds Active   Peripheral Vascular   Peripheral Vascular (WDL) WDL   Edema Right upper extremity; Left upper extremity;Right lower extremity; Left lower extremity;Periorbital   RUE Edema +1;Non-pitting   LUE Edema Trace   RLE Edema Trace   LLE Edema Trace   Skin Color/Condition   Skin Color/Condition (WDL) X   Skin Color Pale   Skin Condition/Temp Warm;Dry   Skin Integrity   Skin Integrity (WDL) WDL   Musculoskeletal   Musculoskeletal (WDL) X   RUE Weakness   LUE Weakness   RL Extremity Weakness   LL Extremity Weakness   Genitourinary   Genitourinary (WDL) WDL   Urine Assessment   Incontinence Yes   Anus/Rectum   Anus/Rectum (WDL) X   Evaluation Other (Comment)  (prolapsed at times)   Psychosocial   Psychosocial (WDL) X   Patient Behaviors Uncooperative

## 2021-07-07 VITALS
WEIGHT: 120 LBS | BODY MASS INDEX: 22.08 KG/M2 | OXYGEN SATURATION: 96 % | RESPIRATION RATE: 20 BRPM | HEIGHT: 62 IN | HEART RATE: 110 BPM | SYSTOLIC BLOOD PRESSURE: 135 MMHG | DIASTOLIC BLOOD PRESSURE: 70 MMHG | TEMPERATURE: 97.6 F

## 2021-07-07 LAB
A/G RATIO: 1.6 (ref 0.8–2)
ALBUMIN SERPL-MCNC: 3.2 G/DL (ref 3.4–4.8)
ALP BLD-CCNC: 63 U/L (ref 25–100)
ALT SERPL-CCNC: 77 U/L (ref 4–36)
ANION GAP SERPL CALCULATED.3IONS-SCNC: 4 MMOL/L (ref 3–16)
AST SERPL-CCNC: 29 U/L (ref 8–33)
BILIRUB SERPL-MCNC: 0.4 MG/DL (ref 0.3–1.2)
BUN BLDV-MCNC: 31 MG/DL (ref 6–20)
CALCIUM SERPL-MCNC: 9.6 MG/DL (ref 8.5–10.5)
CHLORIDE BLD-SCNC: 91 MMOL/L (ref 98–107)
CO2: 37 MMOL/L (ref 20–30)
CREAT SERPL-MCNC: <0.5 MG/DL (ref 0.4–1.2)
GFR AFRICAN AMERICAN: >59
GFR NON-AFRICAN AMERICAN: >60
GLOBULIN: 2 G/DL
GLUCOSE BLD-MCNC: 102 MG/DL (ref 74–106)
POTASSIUM REFLEX MAGNESIUM: 4.9 MMOL/L (ref 3.4–5.1)
SODIUM BLD-SCNC: 132 MMOL/L (ref 136–145)
TOTAL PROTEIN: 5.2 G/DL (ref 6.4–8.3)

## 2021-07-07 PROCEDURE — 99238 HOSP IP/OBS DSCHRG MGMT 30/<: CPT | Performed by: INTERNAL MEDICINE

## 2021-07-07 PROCEDURE — 6370000000 HC RX 637 (ALT 250 FOR IP): Performed by: NURSE PRACTITIONER

## 2021-07-07 PROCEDURE — 6370000000 HC RX 637 (ALT 250 FOR IP): Performed by: INTERNAL MEDICINE

## 2021-07-07 PROCEDURE — 2700000000 HC OXYGEN THERAPY PER DAY

## 2021-07-07 PROCEDURE — 6360000002 HC RX W HCPCS: Performed by: NURSE PRACTITIONER

## 2021-07-07 PROCEDURE — 6360000002 HC RX W HCPCS: Performed by: PHYSICIAN ASSISTANT

## 2021-07-07 PROCEDURE — 2500000003 HC RX 250 WO HCPCS

## 2021-07-07 PROCEDURE — 94660 CPAP INITIATION&MGMT: CPT

## 2021-07-07 PROCEDURE — 2500000003 HC RX 250 WO HCPCS: Performed by: INTERNAL MEDICINE

## 2021-07-07 PROCEDURE — 36415 COLL VENOUS BLD VENIPUNCTURE: CPT

## 2021-07-07 PROCEDURE — 80053 COMPREHEN METABOLIC PANEL: CPT

## 2021-07-07 PROCEDURE — 6370000000 HC RX 637 (ALT 250 FOR IP): Performed by: PHYSICIAN ASSISTANT

## 2021-07-07 RX ORDER — ONDANSETRON 4 MG/1
4 TABLET, ORALLY DISINTEGRATING ORAL 3 TIMES DAILY PRN
Qty: 30 TABLET | Refills: 0 | Status: SHIPPED | OUTPATIENT
Start: 2021-07-07 | End: 2021-07-17

## 2021-07-07 RX ORDER — LACTOBACILLUS RHAMNOSUS GG 10B CELL
30 CAPSULE ORAL DAILY
Qty: 30 CAPSULE | Refills: 0 | Status: SHIPPED | OUTPATIENT
Start: 2021-07-07 | End: 2021-08-06

## 2021-07-07 RX ORDER — LOSARTAN POTASSIUM 50 MG/1
50 TABLET ORAL 2 TIMES DAILY
Qty: 30 TABLET | Refills: 3 | Status: SHIPPED | OUTPATIENT
Start: 2021-07-07

## 2021-07-07 RX ORDER — METOPROLOL TARTRATE 5 MG/5ML
INJECTION INTRAVENOUS
Status: DISCONTINUED
Start: 2021-07-07 | End: 2021-07-07

## 2021-07-07 RX ORDER — DILTIAZEM HYDROCHLORIDE 120 MG/1
120 CAPSULE, COATED, EXTENDED RELEASE ORAL DAILY
Qty: 30 CAPSULE | Refills: 3 | Status: SHIPPED | OUTPATIENT
Start: 2021-07-08

## 2021-07-07 RX ORDER — SPIRONOLACTONE 25 MG/1
25 TABLET ORAL DAILY
Qty: 30 TABLET | Refills: 0 | Status: SHIPPED | OUTPATIENT
Start: 2021-07-07 | End: 2021-08-06

## 2021-07-07 RX ORDER — SERTRALINE HYDROCHLORIDE 100 MG/1
100 TABLET, FILM COATED ORAL DAILY
Qty: 30 TABLET | Refills: 0 | Status: SHIPPED | OUTPATIENT
Start: 2021-07-07 | End: 2021-08-06

## 2021-07-07 RX ORDER — SUCRALFATE 1 G/1
1 TABLET ORAL 4 TIMES DAILY
Qty: 120 TABLET | Refills: 3 | Status: SHIPPED | OUTPATIENT
Start: 2021-07-07

## 2021-07-07 RX ORDER — MORPHINE SULFATE 20 MG/ML
5 SOLUTION ORAL EVERY 4 HOURS PRN
Qty: 30 ML | Refills: 0 | Status: SHIPPED | OUTPATIENT
Start: 2021-07-07 | End: 2021-07-22

## 2021-07-07 RX ORDER — METOPROLOL TARTRATE 5 MG/5ML
5 INJECTION INTRAVENOUS ONCE
Status: COMPLETED | OUTPATIENT
Start: 2021-07-07 | End: 2021-07-07

## 2021-07-07 RX ORDER — CLONIDINE HYDROCHLORIDE 0.1 MG/1
0.1 TABLET ORAL EVERY 6 HOURS PRN
Qty: 60 TABLET | Refills: 3 | Status: SHIPPED | OUTPATIENT
Start: 2021-07-07

## 2021-07-07 RX ORDER — PANTOPRAZOLE SODIUM 40 MG/1
40 TABLET, DELAYED RELEASE ORAL
Qty: 30 TABLET | Refills: 3 | Status: SHIPPED | OUTPATIENT
Start: 2021-07-08

## 2021-07-07 RX ORDER — METOPROLOL TARTRATE 5 MG/5ML
INJECTION INTRAVENOUS
Status: COMPLETED
Start: 2021-07-07 | End: 2021-07-07

## 2021-07-07 RX ORDER — HYDRALAZINE HYDROCHLORIDE 25 MG/1
25 TABLET, FILM COATED ORAL EVERY 8 HOURS SCHEDULED
Qty: 90 TABLET | Refills: 3 | Status: SHIPPED | OUTPATIENT
Start: 2021-07-07

## 2021-07-07 RX ORDER — LORAZEPAM 2 MG/ML
1 CONCENTRATE ORAL EVERY 8 HOURS PRN
Qty: 30 ML | Refills: 0 | Status: SHIPPED | OUTPATIENT
Start: 2021-07-07 | End: 2021-07-21

## 2021-07-07 RX ADMIN — LOSARTAN POTASSIUM 50 MG: 50 TABLET, FILM COATED ORAL at 09:30

## 2021-07-07 RX ADMIN — SUCRALFATE 1 G: 1 TABLET ORAL at 13:28

## 2021-07-07 RX ADMIN — METOPROLOL TARTRATE 5 MG: 5 INJECTION INTRAVENOUS at 09:14

## 2021-07-07 RX ADMIN — METOPROLOL TARTRATE 5 MG: 1 INJECTION, SOLUTION INTRAVENOUS at 07:11

## 2021-07-07 RX ADMIN — SERTRALINE HYDROCHLORIDE 100 MG: 50 TABLET ORAL at 09:26

## 2021-07-07 RX ADMIN — ENOXAPARIN SODIUM 40 MG: 40 INJECTION SUBCUTANEOUS at 09:31

## 2021-07-07 RX ADMIN — Medication 1 CAPSULE: at 09:30

## 2021-07-07 RX ADMIN — POLYETHYLENE GLYCOL (3350) 17 G: 17 POWDER, FOR SOLUTION ORAL at 09:30

## 2021-07-07 RX ADMIN — HYDRALAZINE HYDROCHLORIDE 25 MG: 25 TABLET, FILM COATED ORAL at 13:27

## 2021-07-07 RX ADMIN — DILTIAZEM HYDROCHLORIDE 120 MG: 120 CAPSULE, COATED, EXTENDED RELEASE ORAL at 09:30

## 2021-07-07 RX ADMIN — SPIRONOLACTONE 25 MG: 25 TABLET, FILM COATED ORAL at 09:30

## 2021-07-07 RX ADMIN — METOPROLOL TARTRATE 25 MG: 25 TABLET, FILM COATED ORAL at 09:26

## 2021-07-07 RX ADMIN — METOCLOPRAMIDE 5 MG: 5 INJECTION, SOLUTION INTRAMUSCULAR; INTRAVENOUS at 00:50

## 2021-07-07 RX ADMIN — METOPROLOL TARTRATE 5 MG: 1 INJECTION, SOLUTION INTRAVENOUS at 09:14

## 2021-07-07 RX ADMIN — METOCLOPRAMIDE 5 MG: 5 INJECTION, SOLUTION INTRAMUSCULAR; INTRAVENOUS at 05:45

## 2021-07-07 NOTE — DISCHARGE SUMMARY
Discharge Summary      Patient ID: Carrie Jordan      Patient's PCP: Leanne Ponce MD    Admit Date: 6/26/2021     Discharge Date:  7/7/2021    Admitting Provider: Leanne Ponce MD    Discharging Provider: NNAY Stern     Reason for this admission:   Declining functional status    Discharge Diagnoses: Active Hospital Problems    Diagnosis Date Noted    Urinary tract infection without hematuria [N39.0] 07/05/2021    Acute respiratory failure with hypoxia and hypercapnia (HCC) [J96.01, J96.02] 07/05/2021    PAF (paroxysmal atrial fibrillation) (HCC) [I48.0] 07/01/2021    Gastroesophageal reflux disease without esophagitis [K21.9] 06/29/2021    Rectal prolapse [K62.3] 06/29/2021    Hernia, hiatal [K44.9] 06/29/2021    PAC (premature atrial contraction) [I49.1] 06/29/2021    Constipation [K59.00] 06/27/2021    Nausea [R11.0] 06/27/2021    Weakness [R53.1] 06/26/2021    Hyponatremia [E87.1] 06/26/2021    B12 deficiency [E53.8] 05/17/2016    Essential hypertension [I10] 11/24/2015    Anxiety [F41.9] 08/01/2014    Pulmonary HTN ([facility] [address] [I27.20] 03/22/2013       Procedures:  XR CHEST PORTABLE   Final Result      1. Moderate bilateral basilar airspace disease. XR CHEST PORTABLE   Final Result      1. Moderate bilateral basilar airspace disease. XR CHEST PORTABLE   Final Result      No significant change in large bilateral lower lobe pleural parenchymal opacities      Large hernia with the stomach largely intrathoracic present previously      CT CHEST WO CONTRAST   Final Result      Intrathoracic stomach accounting for cavitary masslike lesion in the left lower chest. Differential diagnosis is discussed above with (posttraumatic) diaphragmatic herniation favored, as diaphragmatic rent is suggested on sagittal reconstruction images      CT ABDOMEN PELVIS WO CONTRAST Additional Contrast? None   Final Result      Large cavitary mass seen at left lung base with dependent fluid.  This is most suggestive of hiatus hernia with distended cecum but is poorly evaluated. Bilateral pleural effusions, left greater than right with patchy airspace disease also noted at the left lung base. Severe lumbar scoliosis. Otherwise no discrete evidence of acute abnormality or abnormal mass identified in the chest abdomen or pelvis. CT CHEST WO CONTRAST   Final Result      Large cavitary mass seen at left lung base with dependent fluid. This is most suggestive of hiatus hernia with distended cecum but is poorly evaluated. Bilateral pleural effusions, left greater than right with patchy airspace disease also noted at the left lung base. Severe lumbar scoliosis. Otherwise no discrete evidence of acute abnormality or abnormal mass identified in the chest abdomen or pelvis. CT HEAD WO CONTRAST   Final Result      No evidence of acute intracranial abnormality. XR CHEST PORTABLE   Final Result      No focal airspace disease. Increased exaggerated elevation of the left hemidiaphragm. Consults:   IP CONSULT TO CASE MANAGEMENT  IP CONSULT TO CARDIOLOGY  IP CONSULT TO HOME CARE NEEDS  IP CONSULT TO DIETITIAN  PT OT    Briefly:   80year old female with PMH of anxiety, depression, fatigue, GERD, HLD, HTN, insomnia, osteopenia and hiatal hernia who was directed admitted for declining functional status. Labs showed hyponatremia with Na 122 on admission. Pt was given IVFs with correction. Prior to planned discharge home last week, pt had acute decline with acute hypoxic respiratory failure, requiring bipap. Workup revealed large intrathoracic hiatal hernia. She declined transfer for surgical evaluation. Patient's mental status also worsened during same time. She has remained confused since. Family has elected for home with hospice. All necessary equipment and medications have been arranged. Patient will be discharged home today.     Hospital Course: Active Hospital Problems     Diagnosis Date Noted    PAF (paroxysmal atrial fibrillation) (HCC) [I48.0]  -Continue beta-blocker and Cardizem  -Has been evaluated by cardiology    07/01/2021    Gastroesophageal reflux disease without esophagitis [K21.9]  -Continue PPI    06/29/2021    Rectal prolapse [K62.3]  -Reduced on admission; has not recurred    06/29/2021    Hernia, hiatal [K44.9]  -Large hiatal hernia in chest noted on imaging.  Patient has deferred surgical evaluation.  -Continue PPI, multiple antinausea medications and Reglan    06/29/2021    PAC (premature atrial contraction) [I49.1]    06/29/2021    Constipation [K59.00]  -Continue bowel regimen    06/27/2021    Nausea [R11.0]  -Suspect secondary to large hiatal hernia  -Continue PPI, Carafate and as needed antiemetics  -per RN, pt often refusing medications    06/27/2021    Weakness [R53.1]  -PT OT consulted, however, pt refused   -Patient insist she is dying; family has elected for Home with hospice    06/26/2021    Hyponatremia [E87.1]  -Suspect secondary to poor p.o. at time of admission; sodium levels have since improved with better p.o. intake and fluid hydration    06/26/2021    B12 deficiency [E53.8]  -Continue replacement    05/17/2016    Essential hypertension [I10]  -Continue Cozaar, hydralazine, diltiazem and metoprolol if pt agreeable    11/24/2015    Anxiety [F41.9]  -Continue home Zoloft and Ativan    08/01/2014    Pulmonary HTN ([facility] [address] [I27.20]     Acute hypoxic respiratory failure  -Suspected to be secondary to intrathoracic stomach and reflux/aspiration pneumonitis  -Speech therapy consulted with recs for dysphagia pureed diet with thin liquids  -initially required bipap; now O2 stable on 2.5 L NC     UTI, ruled out  -UA suggestive of infection; however, urine culture c/w mixed skin kit. Covered with IV rocephin until UCx was reported on 7/6. DC Rocephin.             Disposition: home with hospice    Discharged days  Qty: 12 mL, Refills: 1      estradiol (ESTRACE VAGINAL) 0.1 MG/GM vaginal cream Place 1 g vaginally daily  Qty: 3 Tube, Refills: 3              Patient was seen and examined by Dr. Pineda Gonzalez and plan of care reviewed. Signed:  Electronically signed by NANY Ching on 7/7/2021 at 12:26 PM       Thank you Nisreen Jay MD for the opportunity to be involved in this patient's care. If you have any questions or concerns please feel free to contact me at [phone].

## 2021-07-07 NOTE — PROGRESS NOTES
PIV catheter removed; intact. Discharge home with hospice, per EMS. Daughter is aware to  prescriptions at pharmacy and verbalized understanding of discharge instructions.

## 2021-07-07 NOTE — FLOWSHEET NOTE
07/06/21 2030   Assessment   Charting Type Shift assessment   Neurological   Neuro (WDL) WDL   Level of Consciousness Alert (0)   Stefany Coma Scale   Eye Opening 4   Best Verbal Response 4   Best Motor Response 6   Stefany Coma Scale Score 14   HEENT   HEENT (WDL) WDL   Respiratory   Respiratory (WDL) WDL   Breath Sounds   Right Upper Lobe Clear   Right Middle Lobe Clear   Right Lower Lobe Diminished   Left Upper Lobe Clear   Left Lower Lobe Diminished   Cough/Sputum   Sputum How Obtained None   Cardiac   Cardiac (WDL) X   Cardiac Regularity Regular   Cardiac Rhythm NSR  ( HX afib)   Cardiac Monitor   Telemetry Monitor On Yes   Telemetry Audible Yes   Telemetry Alarms Set Yes   Telemetry Box Number [id number]   Telemetry Monitor Alarm Parameters MX40-12   Gastrointestinal   Abdominal (WDL) X   Hernia Other (Comment)  (hiatal)   Abdomen Inspection Soft   Tenderness Nontender   RUQ Bowel Sounds Active   LUQ Bowel Sounds Active   RLQ Bowel Sounds Active   LLQ Bowel Sounds Active   Peripheral Vascular   Peripheral Vascular (WDL) WDL   Edema Right upper extremity; Left upper extremity;Right lower extremity; Left lower extremity;Periorbital   RUE Edema +1;Non-pitting   LUE Edema Trace   RLE Edema Trace   LLE Edema Trace   Skin Color/Condition   Skin Color/Condition (WDL) X   Skin Color Pale   Skin Condition/Temp Dry; Warm   Skin Integrity   Skin Integrity (WDL) WDL   Musculoskeletal   Musculoskeletal (WDL) X   RUE Weakness   LUE Weakness   RL Extremity Weakness   LL Extremity Weakness   Genitourinary   Genitourinary (WDL) WDL   Urine Assessment   Incontinence Yes   Anus/Rectum   Anus/Rectum (WDL) X   Psychosocial   Psychosocial (WDL) X   Patient Behaviors Uncooperative Med rec complete per pt at bedside   Allergies reviewed  No oral ABX within last 30 days

## 2021-07-07 NOTE — PLAN OF CARE
Problem: Falls - Risk of:  Goal: Will remain free from falls  Description: Will remain free from falls  Outcome: Completed  Goal: Absence of physical injury  Description: Absence of physical injury  Outcome: Completed     Problem: SAFETY  Goal: Free from accidental physical injury  Outcome: Completed  Goal: Free from intentional harm  Outcome: Completed     Problem: DAILY CARE  Goal: Daily care needs are met  7/7/2021 1435 by Didi Chen RN  Outcome: Completed  7/7/2021 1148 by Didi Chen RN  Outcome: Met This Shift     Problem: PAIN  Goal: Patient's pain/discomfort is manageable  Outcome: Completed     Problem: SKIN INTEGRITY  Goal: Skin integrity is maintained or improved  Outcome: Completed     Problem: KNOWLEDGE DEFICIT  Goal: Patient/S.O. demonstrates understanding of disease process, treatment plan, medications, and discharge instructions.   Outcome: Completed     Problem: DISCHARGE BARRIERS  Goal: Patient's continuum of care needs are met  Outcome: Completed     Problem: Breathing Pattern - Ineffective:  Goal: Ability to achieve and maintain a regular respiratory rate will improve  Description: Ability to achieve and maintain a regular respiratory rate will improve  Outcome: Completed

## 2021-07-07 NOTE — FLOWSHEET NOTE
07/07/21 0833   Assessment   Charting Type Shift assessment   Neurological   Neuro (WDL) WDL   Level of Consciousness Alert (0)   Stefany Coma Scale   Eye Opening 4   Best Verbal Response 4   Best Motor Response 6   Stefany Coma Scale Score 14   HEENT   HEENT (WDL) WDL   Respiratory   Respiratory (WDL) WDL   Breath Sounds   Right Upper Lobe Clear   Right Middle Lobe Clear   Right Lower Lobe Diminished   Left Upper Lobe Clear   Left Lower Lobe Diminished   Cough/Sputum   Sputum How Obtained None   Cardiac   Cardiac (WDL) X   Cardiac Regularity Regular   Cardiac Rhythm ST  ( HX afib)   Cardiac Monitor   Telemetry Monitor On Yes   Telemetry Audible Yes   Telemetry Alarms Set Yes   Telemetry Box Number [id number]   Telemetry Monitor Alarm Parameters MX40-12   Gastrointestinal   Abdominal (WDL) X   Hernia Other (Comment)  (hiatal)   Abdomen Inspection Soft   Tenderness Nontender   RUQ Bowel Sounds Active   LUQ Bowel Sounds Active   RLQ Bowel Sounds Active   LLQ Bowel Sounds Active   Peripheral Vascular   Peripheral Vascular (WDL) WDL   Edema Right upper extremity; Left upper extremity;Right lower extremity; Left lower extremity;Periorbital   RUE Edema +1;Non-pitting   LUE Edema Trace   RLE Edema Trace   LLE Edema Trace   Skin Color/Condition   Skin Color/Condition (WDL) X   Skin Integrity   Skin Integrity (WDL) WDL   Musculoskeletal   Musculoskeletal (WDL) X   RUE Weakness   LUE Weakness   RL Extremity Weakness   LL Extremity Weakness   Genitourinary   Genitourinary (WDL) WDL   Urine Assessment   Incontinence Yes   Anus/Rectum   Anus/Rectum (WDL) X   Evaluation Other (Comment)  (prolapse at time)   Psychosocial   Psychosocial (WDL) X   Patient Behaviors Uncooperative   ST 120s-150s. Second dose of IV metoprolol give along with po morning medications. HR decreased to 80-90s. Daughter at bedside.

## 2021-07-07 NOTE — CARE COORDINATION
Spoke with daughter. She states that pt was alert and more herself this AM during MD rounds, but is now back to how she was yesterday. Daughter is agreeable to Hospice at home today. DME to be delivered today. Plan to DC to home with Hospice this afternoon. DME ordered with Hospice:  Bed, BSC, O2, Rw, bipap, air mattress, suction.

## 2021-07-09 ENCOUNTER — CARE COORDINATION (OUTPATIENT)
Dept: CARE COORDINATION | Age: 86
End: 2021-07-09

## 2021-07-09 NOTE — CARE COORDINATION
[facility] Transitions Initial Follow Up Call    Call within 2 business days of discharge: Yes     Patient: Taye Purcell Patient : 10/14/1930 MRN: <[id number]>    Last Discharge [facility]       Complaint Diagnosis Description Type Department Provider    21  Degenerative disc disease, lumbar . .. Admission (Discharged) [facility] Flakito Redman MD          RARS: Readmission Risk Score: 25       Spoke with: I spoke to [facility] daughter Lester Quick and confirmed that the hospice intake nurse was there yesterday. They are waiting to hear from them today. She tells me at this time they want to work with Dr Brenna Lacey. Lester Quick has difficulty getting out right now and no fu appointment scheduled till August.  I let her know that I would confirm with Dr Brenna Lacey how he wants to proceed. No issues or concerns at this time.      Discharge department/facility: [facility]    Non-face-to-face services provided:  Scheduled appointment with PCP-Curt  Obtained and reviewed discharge summary and/or continuity of care documents    Follow Up  Future Appointments   Date Time Provider Dc Johnson   8/10/2021  3:30 PM Flakito Redman, [facility] [place] ELSIE [facility]URIEL[facility]-[place]       Elham Patterson RN

## 2021-07-14 ENCOUNTER — TELEPHONE (OUTPATIENT)
Dept: PRIMARY CARE CLINIC | Age: 86
End: 2021-07-14

## 2022-07-28 NOTE — CARE COORDINATION
[employer] does not swingbed at this time. They recommened NH facility or HH. SWING bed will only be covered if there is no other alternative available (ie no NH within 100 miles). Spoke with daughter, Frederick Rodriguez, and daughter-in-law, Chandra Calvo. They are interested in having the referral for therapy sent [facility], [facility], [facility]. They are also looking into HH with bed, BSC, WC and O2. Referral sent to [facility]. NP updated on plan. Statement Selected

## 2024-03-28 NOTE — PLAN OF CARE
Problem: Falls - Risk of:  Goal: Will remain free from falls  Description: Will remain free from falls  7/2/2021 0958 by Rohini Williamson RN  Outcome: Ongoing  7/2/2021 0048 by Suleiman Mckenna RN  Outcome: Ongoing     Problem: SAFETY  Goal: Free from accidental physical injury  7/2/2021 0958 by Rohini Williamson RN  Outcome: Ongoing  7/2/2021 0048 by Suleiman Mckenna RN  Outcome: Ongoing Pt set up mwv for 10/21 with Dr. ATIYA workman